# Patient Record
Sex: FEMALE | Race: BLACK OR AFRICAN AMERICAN | NOT HISPANIC OR LATINO | URBAN - METROPOLITAN AREA
[De-identification: names, ages, dates, MRNs, and addresses within clinical notes are randomized per-mention and may not be internally consistent; named-entity substitution may affect disease eponyms.]

---

## 2023-01-02 ENCOUNTER — EMERGENCY (EMERGENCY)
Facility: HOSPITAL | Age: 43
LOS: 1 days | Discharge: HOME | End: 2023-01-02
Attending: STUDENT IN AN ORGANIZED HEALTH CARE EDUCATION/TRAINING PROGRAM
Payer: MEDICAID

## 2023-01-02 VITALS
DIASTOLIC BLOOD PRESSURE: 68 MMHG | WEIGHT: 121.92 LBS | HEART RATE: 85 BPM | SYSTOLIC BLOOD PRESSURE: 118 MMHG | HEIGHT: 62 IN | OXYGEN SATURATION: 98 % | RESPIRATION RATE: 16 BRPM | TEMPERATURE: 98 F

## 2023-01-02 VITALS
DIASTOLIC BLOOD PRESSURE: 75 MMHG | RESPIRATION RATE: 18 BRPM | OXYGEN SATURATION: 99 % | TEMPERATURE: 98 F | HEART RATE: 76 BPM | SYSTOLIC BLOOD PRESSURE: 110 MMHG

## 2023-01-02 LAB
ALBUMIN SERPL ELPH-MCNC: 4.1 G/DL — SIGNIFICANT CHANGE UP (ref 3.3–5)
ALP SERPL-CCNC: 42 U/L — SIGNIFICANT CHANGE UP (ref 40–120)
ALT FLD-CCNC: 17 U/L — SIGNIFICANT CHANGE UP (ref 10–45)
ANION GAP SERPL CALC-SCNC: 10 MMOL/L — SIGNIFICANT CHANGE UP (ref 5–17)
APTT BLD: 29.8 SEC — SIGNIFICANT CHANGE UP (ref 27.5–35.5)
AST SERPL-CCNC: 16 U/L — SIGNIFICANT CHANGE UP (ref 10–40)
BASOPHILS # BLD AUTO: 0.06 K/UL — SIGNIFICANT CHANGE UP (ref 0–0.2)
BASOPHILS NFR BLD AUTO: 0.7 % — SIGNIFICANT CHANGE UP (ref 0–2)
BILIRUB SERPL-MCNC: 0.2 MG/DL — SIGNIFICANT CHANGE UP (ref 0.2–1.2)
BUN SERPL-MCNC: 9 MG/DL — SIGNIFICANT CHANGE UP (ref 7–23)
CALCIUM SERPL-MCNC: 8.9 MG/DL — SIGNIFICANT CHANGE UP (ref 8.4–10.5)
CHLORIDE SERPL-SCNC: 105 MMOL/L — SIGNIFICANT CHANGE UP (ref 96–108)
CO2 SERPL-SCNC: 24 MMOL/L — SIGNIFICANT CHANGE UP (ref 22–31)
CREAT SERPL-MCNC: 0.49 MG/DL — LOW (ref 0.5–1.3)
EGFR: 122 ML/MIN/1.73M2 — SIGNIFICANT CHANGE UP
EOSINOPHIL # BLD AUTO: 0.23 K/UL — SIGNIFICANT CHANGE UP (ref 0–0.5)
EOSINOPHIL NFR BLD AUTO: 2.9 % — SIGNIFICANT CHANGE UP (ref 0–6)
GLUCOSE SERPL-MCNC: 89 MG/DL — SIGNIFICANT CHANGE UP (ref 70–99)
HCG SERPL-ACNC: <2 MIU/ML — SIGNIFICANT CHANGE UP
HCT VFR BLD CALC: 38 % — SIGNIFICANT CHANGE UP (ref 34.5–45)
HGB BLD-MCNC: 12.9 G/DL — SIGNIFICANT CHANGE UP (ref 11.5–15.5)
IMM GRANULOCYTES NFR BLD AUTO: 0.1 % — SIGNIFICANT CHANGE UP (ref 0–0.9)
INR BLD: 1.11 RATIO — SIGNIFICANT CHANGE UP (ref 0.88–1.16)
LYMPHOCYTES # BLD AUTO: 3.04 K/UL — SIGNIFICANT CHANGE UP (ref 1–3.3)
LYMPHOCYTES # BLD AUTO: 37.7 % — SIGNIFICANT CHANGE UP (ref 13–44)
MCHC RBC-ENTMCNC: 30 PG — SIGNIFICANT CHANGE UP (ref 27–34)
MCHC RBC-ENTMCNC: 33.9 GM/DL — SIGNIFICANT CHANGE UP (ref 32–36)
MCV RBC AUTO: 88.4 FL — SIGNIFICANT CHANGE UP (ref 80–100)
MONOCYTES # BLD AUTO: 0.48 K/UL — SIGNIFICANT CHANGE UP (ref 0–0.9)
MONOCYTES NFR BLD AUTO: 5.9 % — SIGNIFICANT CHANGE UP (ref 2–14)
NEUTROPHILS # BLD AUTO: 4.25 K/UL — SIGNIFICANT CHANGE UP (ref 1.8–7.4)
NEUTROPHILS NFR BLD AUTO: 52.7 % — SIGNIFICANT CHANGE UP (ref 43–77)
NRBC # BLD: 0 /100 WBCS — SIGNIFICANT CHANGE UP (ref 0–0)
PLATELET # BLD AUTO: 235 K/UL — SIGNIFICANT CHANGE UP (ref 150–400)
POTASSIUM SERPL-MCNC: 3.9 MMOL/L — SIGNIFICANT CHANGE UP (ref 3.5–5.3)
POTASSIUM SERPL-SCNC: 3.9 MMOL/L — SIGNIFICANT CHANGE UP (ref 3.5–5.3)
PROT SERPL-MCNC: 6.6 G/DL — SIGNIFICANT CHANGE UP (ref 6–8.3)
PROTHROM AB SERPL-ACNC: 12.9 SEC — SIGNIFICANT CHANGE UP (ref 10.5–13.4)
RBC # BLD: 4.3 M/UL — SIGNIFICANT CHANGE UP (ref 3.8–5.2)
RBC # FLD: 13.8 % — SIGNIFICANT CHANGE UP (ref 10.3–14.5)
SODIUM SERPL-SCNC: 139 MMOL/L — SIGNIFICANT CHANGE UP (ref 135–145)
TROPONIN T, HIGH SENSITIVITY RESULT: <6 NG/L — SIGNIFICANT CHANGE UP (ref 0–51)
WBC # BLD: 8.07 K/UL — SIGNIFICANT CHANGE UP (ref 3.8–10.5)
WBC # FLD AUTO: 8.07 K/UL — SIGNIFICANT CHANGE UP (ref 3.8–10.5)

## 2023-01-02 PROCEDURE — 99285 EMERGENCY DEPT VISIT HI MDM: CPT | Mod: 25

## 2023-01-02 PROCEDURE — 71046 X-RAY EXAM CHEST 2 VIEWS: CPT | Mod: 26

## 2023-01-02 PROCEDURE — 71046 X-RAY EXAM CHEST 2 VIEWS: CPT

## 2023-01-02 PROCEDURE — 93005 ELECTROCARDIOGRAM TRACING: CPT

## 2023-01-02 PROCEDURE — 36415 COLL VENOUS BLD VENIPUNCTURE: CPT

## 2023-01-02 PROCEDURE — 85025 COMPLETE CBC W/AUTO DIFF WBC: CPT

## 2023-01-02 PROCEDURE — 99285 EMERGENCY DEPT VISIT HI MDM: CPT

## 2023-01-02 PROCEDURE — 85730 THROMBOPLASTIN TIME PARTIAL: CPT

## 2023-01-02 PROCEDURE — 80053 COMPREHEN METABOLIC PANEL: CPT

## 2023-01-02 PROCEDURE — 84702 CHORIONIC GONADOTROPIN TEST: CPT

## 2023-01-02 PROCEDURE — 84484 ASSAY OF TROPONIN QUANT: CPT

## 2023-01-02 PROCEDURE — 85610 PROTHROMBIN TIME: CPT

## 2023-01-02 RX ORDER — ACETAMINOPHEN 500 MG
975 TABLET ORAL ONCE
Refills: 0 | Status: COMPLETED | OUTPATIENT
Start: 2023-01-02 | End: 2023-01-02

## 2023-01-02 RX ORDER — LIDOCAINE 4 G/100G
1 CREAM TOPICAL ONCE
Refills: 0 | Status: COMPLETED | OUTPATIENT
Start: 2023-01-02 | End: 2023-01-02

## 2023-01-02 RX ADMIN — LIDOCAINE 1 PATCH: 4 CREAM TOPICAL at 22:57

## 2023-01-02 RX ADMIN — Medication 975 MILLIGRAM(S): at 22:57

## 2023-01-02 NOTE — ED PROVIDER NOTE - OBJECTIVE STATEMENT
42-year-old female without significant past medical history presents with substernal chest pain over the past 4 months that has been progressing and is worse with breathing.  Not associated with exertion, diaphoresis, shortness of breath, nausea/vomiting.  Patient reports that the pain is worse when she presses on her sternum however it also radiates to the back.  Patient has no history of blood clots, is not on hormonal birth control.  Patient has not had any long travel, or unilateral leg swelling.  Patient has no history of cardiac issues or early cardiac death in her family.  Patient was given a chest x-ray in the outpatient setting that was negative.  Patient has not had any recent travel or sick contacts. Patient has not tried any Tylenol or NSAIDs or heat or lidocaine patch for the pain. Otherwise patient has no headache, shortness of breath, nausea/vomiting/abdominal pain/diarrhea, dysuria, peripheral edema, fever/chills.

## 2023-01-02 NOTE — ED PROVIDER NOTE - CLINICAL SUMMARY MEDICAL DECISION MAKING FREE TEXT BOX
42-year-old female without significant past medical history presents with 4 months of substernal chest pain.  Likely costochondritis versus GERD.  However ACS cannot be excluded.  Labs, troponin, EKG, CXR ordered.

## 2023-01-02 NOTE — ED PROVIDER NOTE - PROGRESS NOTE DETAILS
Patient is reassured that all lab results and chest x-ray are negative.  Patient's pain is mildly improved with lidocaine patch and Tylenol. Likely costochondritis or GERD contributing.  Dispo to Home with PMD follow-up.

## 2023-01-02 NOTE — ED PROVIDER NOTE - PHYSICAL EXAMINATION
GENERAL: well appearing in no acute distress, non-toxic appearing  HEAD: normocephalic, atraumatic  HEENT: normal conjunctiva, oral mucosa moist, uvula midline  CHEST: Reproducible pain with palpation of the sternum midline.  Patient endorses that this pain is consistent with all prior episodes.  CARDIAC: regular rate and rhythm, normal S1S2, no appreciable murmurs  PULM: normal breath sounds, clear to ascultation bilaterally, no rales, rhonchi, wheezing  GI: abdomen nondistended, soft, nontender, no guarding, rebound tenderness  NEURO: moving all 4 extremities, no focal deficits, normal speech, AAOx3   MSK: no peripheral edema, no calf tenderness b/l  SKIN: well-perfused, extremities warm  PSYCH: appropriate mood and affect

## 2023-01-02 NOTE — ED PROVIDER NOTE - PATIENT PORTAL LINK FT
You can access the FollowMyHealth Patient Portal offered by [facility] by registering at the following website: [email or web address]. By joining [facility]’s FollowMyHealth portal, you will also be able to view your health information using other applications (apps) compatible with our system.

## 2023-01-02 NOTE — ED PROVIDER NOTE - NSFOLLOWUPCLINICS_GEN_ALL_ED_FT
[facility]  Cardiology  [address] [place] [id number]  Phone: [phone]  Fax:   Follow Up Time: Urgent

## 2023-01-02 NOTE — ED PROVIDER NOTE - ATTENDING CONTRIBUTION TO CARE
I, Vic Lloyd, performed a history and physical exam of the patient and discussed their management with the resident and/or advanced care provider. I reviewed the resident and/or advanced care provider's note and agree with the documented findings and plan of care. I was present and available for all procedures.    42-year-old female without significant past medical history presents with substernal chest pain over the past 4 months that has been progressing and is worse with breathing.  Not associated with exertion, diaphoresis, shortness of breath, nausea/vomiting.  Patient reports that the pain is worse when she presses on her sternum however it also radiates to the back.  Patient has no history of blood clots, is not on hormonal birth control.  Patient has not had any long travel, or unilateral leg swelling.  Patient has no history of cardiac issues or early cardiac death in her family.  Patient was given a chest x-ray in the outpatient setting that was negative.  Patient has not had any recent travel or sick contacts. Patient has not tried any Tylenol or NSAIDs or heat or lidocaine patch for the pain. Otherwise patient has no headache, shortness of breath, nausea/vomiting/abdominal pain/diarrhea, dysuria, peripheral edema, fever/chills.    Well appearing and in NAD, head normal appearing atraumatic, trachea midline, no respiratory distress, lungs cta bilaterally, rrr no murmurs, soft NT ND abdomen, no visible extremity deformities, Alert and oriented, non focal neuro exam, skin warm and dry, normal affect and mood, no leg swelling or jvd    pw chest pain, ekg. pt on monitor, enzymes, aspirin, cxr for pneumothorax or infiltrates, less likely PE with low risk on wells and PERC neg, unlikely dissection will disposition based on heart score

## 2023-01-02 NOTE — ED PROVIDER NOTE - RAPID ASSESSMENT
42F here for chest pain since September worse with breathing. No syncope or passing out. Pain worse today.     Li Faulkner, Attending Physician: Pt seen in waiting room for physicain triage - comprehensive history and physical is not performed by me - patient to be sent to main ED for full history / physical and medical evaluation - all orders placed by me to be followed by primary team in main ED.

## 2023-01-02 NOTE — ED ADULT NURSE NOTE - OBJECTIVE STATEMENT
43 yo F pt no PMHx p/w a few months of intermittent sharp stabbing pain worsened over the last day to constant stabbing pain from her back to anterior chest radiating down b/l UE worsens with deep inspiration/exertion. pt endorses smoking occasionally but stopped a year ago. pt has tried nothing for relief.  pt is A&Ox4, MAEW, skin arm dry intact/normal for race, cap refill < 2 seconds, breathing comfortably, no JVD, NSR on cardiac monitor.   Pt denies headache, dizziness, palpitations, cough, SOB, abdominal pain, n/v/d, urinary symptoms, fevers, chills, weakness at this time.

## 2023-04-28 ENCOUNTER — OUTPATIENT (OUTPATIENT)
Dept: OUTPATIENT SERVICES | Facility: HOSPITAL | Age: 43
LOS: 1 days | Discharge: HOME | End: 2023-04-28

## 2023-04-28 PROBLEM — Z78.9 OTHER SPECIFIED HEALTH STATUS: Chronic | Status: ACTIVE | Noted: 2023-01-02

## 2023-05-16 PROBLEM — Z00.00 ENCOUNTER FOR PREVENTIVE HEALTH EXAMINATION: Status: ACTIVE | Noted: 2023-05-16

## 2023-05-17 DIAGNOSIS — F43.0 ACUTE STRESS REACTION: ICD-10-CM

## 2023-05-17 DIAGNOSIS — F29 UNSPECIFIED PSYCHOSIS NOT DUE TO A SUBSTANCE OR KNOWN PHYSIOLOGICAL CONDITION: ICD-10-CM

## 2023-05-23 ENCOUNTER — APPOINTMENT (OUTPATIENT)
Dept: CARDIOLOGY | Facility: HOSPITAL | Age: 43
End: 2023-05-23

## 2023-08-28 ENCOUNTER — OFFICE VISIT (OUTPATIENT)
Dept: PHYSICAL THERAPY | Facility: CLINIC | Age: 43
End: 2023-08-28
Payer: COMMERCIAL

## 2023-08-28 DIAGNOSIS — M54.2 CHRONIC NECK PAIN: Primary | ICD-10-CM

## 2023-08-28 DIAGNOSIS — G89.29 CHRONIC NECK PAIN: Primary | ICD-10-CM

## 2023-08-28 PROCEDURE — 97110 THERAPEUTIC EXERCISES: CPT

## 2023-08-28 PROCEDURE — 97112 NEUROMUSCULAR REEDUCATION: CPT

## 2023-08-28 NOTE — PROGRESS NOTES
Daily Note     Today's date: 2023  Patient name: Luann Rahman  : 1980  MRN: [id number]  Referring provider: Destiny Champion*  Dx:   Encounter Diagnosis     ICD-10-CM    1. Chronic neck pain  M54.2     G89.29                      Subjective: Patient reports stiffness this morning, notes continued improvements with stretching. Objective: See treatment diary below      Assessment: Tolerated treatment well, cont to demonstrate painful/limited mobility but reporting consistent improvement in pain level. Patient demonstrated fatigue post treatment and would benefit from continued PT. Plan: Continue per plan of care. Progress note during next visit. Precautions: standard.      SOC: 23   FOTO: 23   POC Expiration: 10/23/23  Daily Treatment Log:  Date 23    Visit # 6/12 FOTO      Manual        Gentle manual traction        Prone PA mobs thoracic         Ther Exer 35'  28'  25' 30'     Pulleys  3'  5'  5'  5'     Supine cerv rot 1x10 R/L  Seated SNAGs w/ towel 3"x10 R/L       Supine OH flex w/ cane W/ MH 5"x10  W/ MH 5"x10  W/ MH 5"x10  W/ MH 5"x10     Supine B/L ER in 90/90 w/ cane  W/ MH 5"x10  W/ MH 5"x10        Wall slides  5"x10  5"x15  5"x15  5"x15     Seated pball 3 way str  5"x10 ea  5"x10 ea  5"x10 ea 5"x10 ea    Seated T/S ext W/ MB 3"x15   Over chair 3"x15  3"x15 over chair  3"x15 over     Self massage w/ MB behind back seated to paraspinals         B ER AROM W/ MH supine 1x10  W/ MH supine 1x10 W/ MH supine  1x10  W/ MH supine 1x10 5"    Standing open books         B/L horz abd against wall         Sup horiz abd  W/ MH 1x10  W/ MH 1x10  W/ MH 1x10  W/ MH1x10             HEP        Ther Activ                                                        NMReed 10'  10'  15'  10'    Scap retraction seated  B/L row yellow tubing 2x10  B/L row red tubing 1x10  B/L row red 2x10 B/L row red tube 2x10    B/L shoulder ext  Yellow tubing 2x10  Red tubing 1x10  Red tube 2x10  Red tube 2x10                             Modalities        MH  supine Cerv/chest 10'  during supine stretching   supine Cerv/chest 10'  during supine stretching  Supine cerv/chest during stretching Supine cerv/chest during stretching 10'                        Access Code: [email or web address]  URL: [email or web address]  Date: 08/21/2023  Prepared by: Tran Cho    Exercises  - Supine Chin Tuck  - 2 x daily - 7 x weekly - 2 sets - 10 reps - 5 hold  - Supine Cervical Rotation AROM on Pillow  - 2 x daily - 7 x weekly - 2 sets - 10 reps  - Supine Shoulder Horizontal Abduction with Dumbbells  - 1 x daily - 7 x weekly - 3 sets - 10 reps  - Supine Shoulder Flexion with Dowel  - 1 x daily - 7 x weekly - 3 sets - 10 reps  - Cervical Extension AROM with Strap  - 2 x daily - 7 x weekly - 2 sets - 10 reps  - Seated Thoracic Lumbar Extension  - 1 x daily - 7 x weekly - 3 sets - 10 reps  - Shoulder Flexion Wall Slide with Towel  - 1 x daily - 7 x weekly - 3 sets - 10 reps

## 2023-08-29 ENCOUNTER — OFFICE VISIT (OUTPATIENT)
Dept: OBGYN CLINIC | Facility: CLINIC | Age: 43
End: 2023-08-29
Payer: COMMERCIAL

## 2023-08-29 ENCOUNTER — APPOINTMENT (OUTPATIENT)
Dept: RADIOLOGY | Facility: CLINIC | Age: 43
End: 2023-08-29
Payer: COMMERCIAL

## 2023-08-29 VITALS
WEIGHT: 133.6 LBS | BODY MASS INDEX: 25.22 KG/M2 | HEART RATE: 76 BPM | SYSTOLIC BLOOD PRESSURE: 100 MMHG | HEIGHT: 61 IN | DIASTOLIC BLOOD PRESSURE: 67 MMHG

## 2023-08-29 DIAGNOSIS — M54.2 NECK PAIN: ICD-10-CM

## 2023-08-29 DIAGNOSIS — M79.641 RIGHT HAND PAIN: ICD-10-CM

## 2023-08-29 DIAGNOSIS — M54.12 CERVICAL RADICULOPATHY: Primary | ICD-10-CM

## 2023-08-29 PROCEDURE — 73130 X-RAY EXAM OF HAND: CPT

## 2023-08-29 PROCEDURE — 99204 OFFICE O/P NEW MOD 45 MIN: CPT | Performed by: ORTHOPAEDIC SURGERY

## 2023-08-29 PROCEDURE — 72040 X-RAY EXAM NECK SPINE 2-3 VW: CPT

## 2023-08-29 RX ORDER — MELOXICAM 15 MG/1
15 TABLET ORAL DAILY
COMMUNITY

## 2023-08-29 RX ORDER — METHOCARBAMOL 750 MG/1
750 TABLET, FILM COATED ORAL EVERY 6 HOURS PRN
COMMUNITY
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
## 2023-08-31 ENCOUNTER — TELEPHONE (OUTPATIENT)
Age: 43
End: 2023-08-31

## 2023-08-31 ENCOUNTER — EVALUATION (OUTPATIENT)
Dept: PHYSICAL THERAPY | Facility: CLINIC | Age: 43
End: 2023-08-31
Payer: COMMERCIAL

## 2023-08-31 DIAGNOSIS — M54.2 CHRONIC NECK PAIN: Primary | ICD-10-CM

## 2023-08-31 DIAGNOSIS — G89.29 CHRONIC NECK PAIN: Primary | ICD-10-CM

## 2023-08-31 PROCEDURE — 97110 THERAPEUTIC EXERCISES: CPT
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
## 2023-08-31 NOTE — TELEPHONE ENCOUNTER
Caller: Self    Doctor: Army Hyman    Reason for call: Patient calling to schedule MRI, warm transferred [facility] from central scheduling    Call back#: [phone]

## 2023-09-05 ENCOUNTER — TELEPHONE (OUTPATIENT)
Dept: PHYSICAL THERAPY | Facility: CLINIC | Age: 43
End: 2023-09-05

## 2023-09-05 ENCOUNTER — APPOINTMENT (OUTPATIENT)
Dept: PHYSICAL THERAPY | Facility: CLINIC | Age: 43
End: 2023-09-05
Payer: COMMERCIAL

## 2023-09-07 ENCOUNTER — OFFICE VISIT (OUTPATIENT)
Dept: PHYSICAL THERAPY | Facility: CLINIC | Age: 43
End: 2023-09-07
Payer: COMMERCIAL

## 2023-09-07 DIAGNOSIS — G89.29 CHRONIC NECK PAIN: Primary | ICD-10-CM

## 2023-09-07 DIAGNOSIS — M54.2 CHRONIC NECK PAIN: Primary | ICD-10-CM

## 2023-09-07 PROCEDURE — 97110 THERAPEUTIC EXERCISES: CPT

## 2023-09-07 PROCEDURE — 97112 NEUROMUSCULAR REEDUCATION: CPT

## 2023-09-07 NOTE — PROGRESS NOTES
Daily Note     Today's date: 2023  Patient name: Modesto Waite  : 1980  MRN: [id number]  Referring provider: Olivia Arechiga*  Dx:   Encounter Diagnosis     ICD-10-CM    1. Chronic neck pain  M54.2     G89.29           Start Time: 1750  Stop Time: 7010  Total time in clinic (min): 40 minutes     Treatment and documentation completed by Piedad Fuchs SPT, under direct supervision and review of documentation completed by Kenroy Gaffney DPT. Subjective: Patient reports soreness upon entering today's session and presented with a more guarded posture than her norm. Objective: See treatment diary below      Assessment: Tolerated treatment well, though cont to demonstrate painful/limited mobility but reporting consistent improvement in pain level. Pt kept her MH to b/l shoulders on during a few interventions in supine, sitting, and standing that felt more comfortable per pt. Patient demonstrated fatigue post treatment and would benefit from continued PT. Plan: Continue per plan of care. Progress note during next visit. Precautions: standard.      SOC: 23   FOTO: 23   POC Expiration: 10/23/23  Daily Treatment Log:  Date 23   Visit #  FOTO  7/12  8/12 9/12 10/12   Manual        Gentle manual traction        Prone PA mobs thoracic         Ther Exer 35'  35'  25' 30'  30'   Pulleys  3'  5'  5'  5'     Supine cerv rot 1x10 R/L  Seated SNAGs w/ towel 3"x10 R/L    Seated SNAGs w/ towel 3"x10 R/L    Supine OH flex w/ cane W/ MH 5"x10  W/ MH 5"x10  W/ MH 5"x10  W/ MH 5"x10  W/ MH 5"x10    Supine B/L ER in 90/90 w/ cane  W/ MH 5"x10  W/ MH 5"x10        Wall slides  5"x10  5"x15  5"x15  5"x15  5"x15' ea   Seated pball 3 way str  5"x10 ea  5"x10 ea  5"x10 ea 5"x10 ea 5"x10 ea   Seated T/S ext W/ MB 3"x15   Over chair 3"x15  3"x15 over chair  3"x15 over  3"x15 over    Self massage w/ MB behind back seated to paraspinals         B ER AROM W/ MH supine 1x10  W/ MH supine 1x10 W/ MH supine  1x10  W/ MH supine 1x10 5" W/ MH supine 1x10 5"   Standing open books         B/L horz abd against wall         Sup horiz abd  W/ MH 1x10  W/ MH 1x10  W/ MH 1x10  W/ MH1x10  ABD only, W/ MH1x10            HEP        Ther Activ                                                        NMReed 10'  10'  15'  10' 10'   Scap retraction seated  B/L row yellow tubing 2x10  B/L row red tubing 1x10  B/L row red 2x10 B/L row red tube 2x10 B/L row red tube 2x10   B/L shoulder ext  Yellow tubing 2x10  Red tubing 1x10  Red tube 2x10  Red tube 2x10  Red tube 2x10   Supine chin tuck     3"x10                   Modalities        MH  supine Cerv/chest 10'  during supine stretching   supine Cerv/chest 10'  during supine stretching  Supine cerv/chest during stretching Supine cerv/chest during stretching 10' Supine cerv/chest during stretching 10'                       Access Code: [email or web address]  URL: [email or web address]  Date: 08/21/2023  Prepared by: Pj Peters    Exercises  - Supine Chin Tuck  - 2 x daily - 7 x weekly - 2 sets - 10 reps - 5 hold  - Supine Cervical Rotation AROM on Pillow  - 2 x daily - 7 x weekly - 2 sets - 10 reps  - Supine Shoulder Horizontal Abduction with Dumbbells  - 1 x daily - 7 x weekly - 3 sets - 10 reps  - Supine Shoulder Flexion with Dowel  - 1 x daily - 7 x weekly - 3 sets - 10 reps  - Cervical Extension AROM with Strap  - 2 x daily - 7 x weekly - 2 sets - 10 reps  - Seated Thoracic Lumbar Extension  - 1 x daily - 7 x weekly - 3 sets - 10 reps  - Shoulder Flexion Wall Slide with Towel  - 1 x daily - 7 x weekly - 3 sets - 10 reps

## 2023-09-11 ENCOUNTER — OFFICE VISIT (OUTPATIENT)
Dept: PHYSICAL THERAPY | Facility: CLINIC | Age: 43
End: 2023-09-11
Payer: COMMERCIAL

## 2023-09-11 DIAGNOSIS — M54.2 CHRONIC NECK PAIN: Primary | ICD-10-CM

## 2023-09-11 DIAGNOSIS — G89.29 CHRONIC NECK PAIN: Primary | ICD-10-CM

## 2023-09-11 PROCEDURE — 97112 NEUROMUSCULAR REEDUCATION: CPT

## 2023-09-11 PROCEDURE — 97110 THERAPEUTIC EXERCISES: CPT

## 2023-09-11 NOTE — PROGRESS NOTES
Daily Note     Today's date: 2023  Patient name: Joseph Cobb  : 1980  MRN: [id number]  Referring provider: Gustavo Mejia*  Dx:   Encounter Diagnosis     ICD-10-CM    1. Chronic neck pain  M54.2     G89.29           Start Time: 4085  Stop Time: 1100  Total time in clinic (min): 45 minutes  Treatment and documentation completed by Becky DAY, under direct supervision and review of documentation completed by Jas CARMONAT. Subjective: Pt states she was in a lot of pain yesterday, and thinks this was due to the weather. She notes a fairly constant pain of around 7/10 from her chest down her b/l arms during this time. Pain presentation today is more prevalent R>L in the UEs. That being said, she notes that her chest is feeling "much better". Pt additionally notes tomorrow she is going to see her cardiologist.        Objective: See treatment diary below      Assessment: Tolerated treatment well, continuing to keep [address] on the b/l shoulders through her first few interventions. PT showed an objective decrease of pain levels and increase of t/s mobility following pa mobs to t/s. Patient continues to demonstrate painful/limited mobility during AAROM of the b/l shoulders, but reporting consistent improvement in pain level during session. Patient demonstrated fatigue post treatment and would benefit from continued PT to address the above functional limitations. Plan: Continue per plan of care. Progress note during next visit. Precautions: standard.      SOC: 23   FOTO: 23   POC Expiration: 10/23/23  Daily Treatment Log:  Date 23   Visit # 11/12    9/12 10/12   Manual 5'       Gentle manual traction        Prone PA mobs thoracic  SJ       Ther Exer 30'   30'  30'   Pulleys  5'   5'     Supine cerv rot     Seated SNAGs w/ towel 3"x10 R/L    Supine OH flex w/ cane W/ MH 5"x10   W/ MH 5"x10  W/ MH 5"x10    Supine B/L ER in 90/90 w/ cane         Wall slides  5"x15' ea   5"x15  5"x15' ea   Seated pball 3 way str  5"x10 ea   5"x10 ea 5"x10 ea   Seated T/S ext    3"x15 over  3"x15 over    Self massage w/ MB behind back seated to paraspinals         B ER AROM W/ MH supine 1x10 5"   W/ MH supine 1x10 5" W/ MH supine 1x10 5"   Standing open books  10x R/L       B/L horz abd against wall         Sup horiz abd  ABD only, W/ MH1x10    W/ MH1x10  ABD only, W/ MH1x10            HEP        Ther Activ                                                        NMReed 10'   10' 10'   Scap retraction seated  B/L row red tube 2x10   B/L row red tube 2x10 B/L row red tube 2x10   B/L shoulder ext  Red tube 2x10   Red tube 2x10  Red tube 2x10   Supine chin tuck 3"x10    3"x10                   Modalities        MH     Supine cerv/chest during stretching 10' Supine cerv/chest during stretching 10'                       Access Code: [email or web address]  URL: [email or web address]  Date: 08/21/2023  Prepared by: Maylin Noguera    Exercises  - Supine Chin Tuck  - 2 x daily - 7 x weekly - 2 sets - 10 reps - 5 hold  - Supine Cervical Rotation AROM on Pillow  - 2 x daily - 7 x weekly - 2 sets - 10 reps  - Supine Shoulder Horizontal Abduction with Dumbbells  - 1 x daily - 7 x weekly - 3 sets - 10 reps  - Supine Shoulder Flexion with Dowel  - 1 x daily - 7 x weekly - 3 sets - 10 reps  - Cervical Extension AROM with Strap  - 2 x daily - 7 x weekly - 2 sets - 10 reps  - Seated Thoracic Lumbar Extension  - 1 x daily - 7 x weekly - 3 sets - 10 reps  - Shoulder Flexion Wall Slide with Towel  - 1 x daily - 7 x weekly - 3 sets - 10 reps

## 2023-09-14 ENCOUNTER — OFFICE VISIT (OUTPATIENT)
Dept: PHYSICAL THERAPY | Facility: CLINIC | Age: 43
End: 2023-09-14
Payer: COMMERCIAL

## 2023-09-14 DIAGNOSIS — M54.2 CHRONIC NECK PAIN: Primary | ICD-10-CM

## 2023-09-14 DIAGNOSIS — G89.29 CHRONIC NECK PAIN: Primary | ICD-10-CM

## 2023-09-14 PROCEDURE — 97110 THERAPEUTIC EXERCISES: CPT | Performed by: PHYSICAL THERAPIST

## 2023-09-14 PROCEDURE — 97112 NEUROMUSCULAR REEDUCATION: CPT | Performed by: PHYSICAL THERAPIST

## 2023-09-14 NOTE — PROGRESS NOTES
Daily Note     Today's date: 2023  Patient name: Yulissa Hamlin  : 1980  MRN: [id number]  Referring provider: Vonda Prince*  Dx:   Encounter Diagnosis     ICD-10-CM    1. Chronic neck pain  M54.2     G89.29                      Subjective: Pt reports that she continues to feel stiff however, overall is pleased with her progress. Objective: See treatment diary below      Assessment: Tolerated treatment well. Patient demonstrated fatigue post treatment, exhibited good technique with therapeutic exercises and would benefit from continued PT      Plan: Continue per plan of care. Precautions: standard.      SOC: 23   FOTO: 23   POC Expiration: 10/23/23  Daily Treatment Log:  Date 23   Visit # 12  12/12  9/12 10/12   Manual 5'       Gentle manual traction        Prone PA mobs thoracic  SJ       Ther Exer 30'   30'  30'   Pulleys  5'   5'     Supine cerv rot     Seated SNAGs w/ towel 3"x10 R/L    Supine OH flex w/ cane W/ MH 5"x10 W/ MH 5' x 10   W/ MH 5"x10  W/ MH 5"x10    Supine B/L ER in 90/90 w/ cane         Wall slides  5"x15' ea 5' x 15   5"x15  5"x15' ea   Seated pball 3 way str  5"x10 ea 5' x 10 ea.   5"x10 ea 5"x10 ea   Seated T/S ext    3"x15 over  3"x15 over    Self massage w/ MB behind back seated to paraspinals         B ER AROM W/ MH supine 1x10 5" W/ MH supine 1 x 10 5"  W/ MH supine 1x10 5" W/ MH supine 1x10 5"   Standing open books  10x R/L 10x R/L      B/L horz abd against wall         Sup horiz abd  ABD only, W/ MH1x10  Abd only, w/ MH x 10   W/ MH1x10  ABD only, W/ MH1x10            HEP        Ther Activ                                                        NMReed 10'   10' 10'   Scap retraction seated  B/L row red tube 2x10   B/L row red tube 2x10 B/L row red tube 2x10   B/L shoulder ext  Red tube 2x10   Red tube 2x10  Red tube 2x10   Supine chin tuck 3"x10 3' x 10    3"x10                   Modalities             Supine cerv/chest during stretching 10' Supine cerv/chest during stretching 10'                       Access Code: [email or web address]  URL: [email or web address]  Date: 08/21/2023  Prepared by: Marychuy Munoz    Exercises  - Supine Chin Tuck  - 2 x daily - 7 x weekly - 2 sets - 10 reps - 5 hold  - Supine Cervical Rotation AROM on Pillow  - 2 x daily - 7 x weekly - 2 sets - 10 reps  - Supine Shoulder Horizontal Abduction with Dumbbells  - 1 x daily - 7 x weekly - 3 sets - 10 reps  - Supine Shoulder Flexion with Dowel  - 1 x daily - 7 x weekly - 3 sets - 10 reps  - Cervical Extension AROM with Strap  - 2 x daily - 7 x weekly - 2 sets - 10 reps  - Seated Thoracic Lumbar Extension  - 1 x daily - 7 x weekly - 3 sets - 10 reps  - Shoulder Flexion Wall Slide with Towel  - 1 x daily - 7 x weekly - 3 sets - 10 reps

## 2023-09-15 ENCOUNTER — HOSPITAL ENCOUNTER (OUTPATIENT)
Dept: RADIOLOGY | Facility: HOSPITAL | Age: 43
Discharge: HOME | End: 2023-09-15
Attending: ORTHOPAEDIC SURGERY
Payer: COMMERCIAL

## 2023-09-15 DIAGNOSIS — M54.12 CERVICAL RADICULOPATHY: ICD-10-CM

## 2023-09-15 PROCEDURE — 72141 MRI NECK SPINE W/O DYE: CPT

## 2023-09-15 PROCEDURE — G1004 CDSM NDSC: HCPCS

## 2023-09-18 ENCOUNTER — OFFICE VISIT (OUTPATIENT)
Dept: PHYSICAL THERAPY | Facility: CLINIC | Age: 43
End: 2023-09-18
Payer: COMMERCIAL

## 2023-09-18 DIAGNOSIS — M54.2 CHRONIC NECK PAIN: Primary | ICD-10-CM

## 2023-09-18 DIAGNOSIS — G89.29 CHRONIC NECK PAIN: Primary | ICD-10-CM

## 2023-09-18 PROCEDURE — 97110 THERAPEUTIC EXERCISES: CPT

## 2023-09-18 PROCEDURE — 97112 NEUROMUSCULAR REEDUCATION: CPT

## 2023-09-18 PROCEDURE — 97140 MANUAL THERAPY 1/> REGIONS: CPT

## 2023-09-18 NOTE — PROGRESS NOTES
Daily Note     Today's date: 2023  Patient name: Mitra Lim  : 1980  MRN: [id number]  Referring provider: Isaac Kwok*  Dx:   Encounter Diagnosis     ICD-10-CM    1. Chronic neck pain  M54.2     G89.29                      Subjective: Pt reports that she feels like her "mobility is better" in her UEs, though she believes that the current rainy weather has increased her pain today. Objective: See treatment diary below  Treatment and documentation completed by Santhosh Culp SPT, under direct supervision and review of documentation completed by Socorro Denise DPT. Assessment: Tolerated treatment well. Patient demonstrated fatigue post treatment, exhibited good technique with therapeutic exercises and would benefit from continued PT. Pt continues to wear MH to b/l shoulder for first few interventions. Trigger point release performed in sidelying w/RUE across body w/significant objective improvements noted in pt mobility following. Plan: Continue per plan of care. Precautions: standard. SOC: 23   FOTO: 23   POC Expiration: 10/23/23  Daily Treatment Log:  Date 23     Visit # 11/12       Manual 5'  10'     Gentle manual traction        Trigger point release to R trap   SJ     Prone PA mobs thoracic  SJ  SJ     Ther Exer 30'  20'     Pulleys  5'  5'     Supine cerv rot        Supine OH flex w/ cane W/ MH 5"x10 W/ MH 5' x 10  W/ MH 5' x 10     Supine B/L ER in 90/90 w/ cane         Wall slides  5"x15' ea 5' x 15  5' x 15      Seated pball 3 way str  5"x10 ea 5' x 10 ea. 5' x 10 ea.       Seated T/S ext        Self massage w/ MB behind back seated to paraspinals         B ER AROM W/ MH supine 1x10 5" W/ MH supine 1 x 10 5"      Standing open books  10x R/L 10x R/L 10x R/L     B/L horz abd against wall         Sup horiz abd  ABD only, W/ MH1x10  Abd only, w/ MH x 10  Abd only, w/ MH x 10              HEP        Ther Activ NMReed 10'  10'     Scap retraction seated  B/L row red tube 2x10  Red tube 2x10     B/L shoulder ext  Red tube 2x10  Red tube 2x10     Supine chin tuck 3"x10 3' x 10  3' x 10      Seated t/s rotation w/foam between legs   10x each direction              Modalities        MH                             Access Code: [email or web address]  URL: [email or web address]  Date: 08/21/2023  Prepared by: Ulysses Melgar    Exercises  - Supine Chin Tuck  - 2 x daily - 7 x weekly - 2 sets - 10 reps - 5 hold  - Supine Cervical Rotation AROM on Pillow  - 2 x daily - 7 x weekly - 2 sets - 10 reps  - Supine Shoulder Horizontal Abduction with Dumbbells  - 1 x daily - 7 x weekly - 3 sets - 10 reps  - Supine Shoulder Flexion with Dowel  - 1 x daily - 7 x weekly - 3 sets - 10 reps  - Cervical Extension AROM with Strap  - 2 x daily - 7 x weekly - 2 sets - 10 reps  - Seated Thoracic Lumbar Extension  - 1 x daily - 7 x weekly - 3 sets - 10 reps  - Shoulder Flexion Wall Slide with Towel  - 1 x daily - 7 x weekly - 3 sets - 10 reps

## 2023-09-19 DIAGNOSIS — M54.12 CERVICAL RADICULOPATHY: Primary | ICD-10-CM

## 2023-09-20 ENCOUNTER — TELEPHONE (OUTPATIENT)
Dept: OBGYN CLINIC | Facility: CLINIC | Age: 43
End: 2023-09-20

## 2023-09-20 NOTE — TELEPHONE ENCOUNTER
juan alberto pt and advised, Pt scheduled   ----- Message from Allison Lewis DO sent at 9/19/2023  9:34 AM EDT -----  Celeste Morales had an mri of her neck. It showed narrowing where one of her nerves exits the spine. This can be causing her pain. I would like for her to follow up with our spine and pain doctors to help treat this.

## 2023-09-21 ENCOUNTER — CONSULT (OUTPATIENT)
Dept: PAIN MEDICINE | Facility: CLINIC | Age: 43
End: 2023-09-21
Payer: COMMERCIAL

## 2023-09-21 ENCOUNTER — TELEPHONE (OUTPATIENT)
Dept: PAIN MEDICINE | Facility: CLINIC | Age: 43
End: 2023-09-21

## 2023-09-21 VITALS
HEIGHT: 61 IN | HEART RATE: 106 BPM | SYSTOLIC BLOOD PRESSURE: 122 MMHG | BODY MASS INDEX: 25.49 KG/M2 | RESPIRATION RATE: 18 BRPM | WEIGHT: 135 LBS | DIASTOLIC BLOOD PRESSURE: 73 MMHG

## 2023-09-21 DIAGNOSIS — M54.12 CERVICAL RADICULOPATHY: ICD-10-CM

## 2023-09-21 DIAGNOSIS — M50.13 CERVICAL DISC DISORDER WITH RADICULOPATHY OF CERVICOTHORACIC REGION: Primary | ICD-10-CM

## 2023-09-21 PROCEDURE — 99204 OFFICE O/P NEW MOD 45 MIN: CPT | Performed by: STUDENT IN AN ORGANIZED HEALTH CARE EDUCATION/TRAINING PROGRAM

## 2023-09-21 RX ORDER — METOPROLOL SUCCINATE 25 MG/1
TABLET, EXTENDED RELEASE ORAL
COMMUNITY
Start: 2023-07-13

## 2023-09-21 RX ORDER — GABAPENTIN 300 MG/1
300 CAPSULE ORAL
Qty: 30 CAPSULE | Refills: 0 | Status: SHIPPED | OUTPATIENT
Start: 2023-09-21 | End: 2023-10-21

## 2023-09-21 NOTE — TELEPHONE ENCOUNTER
Scheduled patient for GEORGE 10/20/23  Patient denies RX blood thinners/ NSAIDS  Nothing to eat or drink 1 hour prior to procedure  Needs to arrange transportation  Proper clothing for procedure  No vaccines 2 weeks prior or after procedure  If ill or place on antibiotics, please call to reschedule
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
## 2023-09-25 ENCOUNTER — OFFICE VISIT (OUTPATIENT)
Dept: PHYSICAL THERAPY | Facility: CLINIC | Age: 43
End: 2023-09-25
Payer: COMMERCIAL

## 2023-09-25 DIAGNOSIS — G89.29 CHRONIC NECK PAIN: Primary | ICD-10-CM

## 2023-09-25 DIAGNOSIS — M54.2 CHRONIC NECK PAIN: Primary | ICD-10-CM

## 2023-09-25 PROCEDURE — 97140 MANUAL THERAPY 1/> REGIONS: CPT

## 2023-09-25 PROCEDURE — 97110 THERAPEUTIC EXERCISES: CPT

## 2023-09-25 PROCEDURE — 97112 NEUROMUSCULAR REEDUCATION: CPT

## 2023-09-25 NOTE — PROGRESS NOTES
Daily Note     Today's date: 2023  Patient name: Lelo Garnica  : 1980  MRN: [id number]  Referring provider: Laverne Henriquez*  Dx:   Encounter Diagnosis     ICD-10-CM    1. Chronic neck pain  M54.2     G89.29                      Subjective: Pt reports she saw pain management after her MRI results came in and they are recommending an injection for her neck. She is unsure whether she wants to proceed but did schedule it. In general she reports improvements in her neck, chest and upper back since starting therapy however she does cont to have daily pain and limitation. Objective: See treatment diary below    Assessment: Tolerated treatment well, with gradual improvements noted in mobility and pain of lower cervical upper thoracic spine; cont to c/o pain in chest that limits her activity tolerance. Plan to add in SNAGS for rotation next session. Patient demonstrated fatigue post treatment, exhibited good technique with therapeutic exercises and would benefit from continued PT. Plan: Continue per plan of care. Precautions: standard. SOC: 23   FOTO: 23   POC Expiration: 10/23/23  Daily Treatment Log:  Date 23     Visit # 11/12      Manual 5'  10' 10'    Gentle manual traction        Trigger point release to R trap   SJ LL    Prone PA mobs thoracic  SJ  SJ LL    Ther Exer 30'  20' 20'    Pulleys  5'  5' 5'    Supine cerv rot        Supine OH flex w/ cane W/ MH 5"x10 W/ MH 5' x 10  W/ MH 5' x 10 W/ MH 5"x10      Wall slides  5"x15' ea 5' x 15  5' x 15  5"x15     Seated pball 3 way str  5"x10 ea 5' x 10 ea. 5' x 10 ea.       Seated T/S ext        B ER AROM W/ MH supine 1x10 5" W/ MH supine 1 x 10 5"  W/ MH in supine 5"x10     Standing open books  10x R/L 10x R/L 10x R/L     B/L horz abd against wall         Sup horiz abd  ABD only, W/ MH1x10  Abd only, w/ MH x 10  Abd only, w/ MH x 10  abd only w/ MH x10     SNAGS rot w/ towel         HEP        Ther Activ                                                        NMReed 10'  10' 8'    Scap retraction seated  B/L row red tube 2x10  Red tube 2x10 Red tube 2x10     B/L shoulder ext  Red tube 2x10  Red tube 2x10 Red tube 2x10     Supine chin tuck 3"x10 3' x 10  3' x 10  3" x10    Seated t/s rotation w/foam between legs   10x each direction              Modalities        MH                             Access Code: [email or web address]  URL: [email or web address]  Date: 08/21/2023  Prepared by: Ninfa Renteria    Exercises  - Supine Chin Tuck  - 2 x daily - 7 x weekly - 2 sets - 10 reps - 5 hold  - Supine Cervical Rotation AROM on Pillow  - 2 x daily - 7 x weekly - 2 sets - 10 reps  - Supine Shoulder Horizontal Abduction with Dumbbells  - 1 x daily - 7 x weekly - 3 sets - 10 reps  - Supine Shoulder Flexion with Dowel  - 1 x daily - 7 x weekly - 3 sets - 10 reps  - Cervical Extension AROM with Strap  - 2 x daily - 7 x weekly - 2 sets - 10 reps  - Seated Thoracic Lumbar Extension  - 1 x daily - 7 x weekly - 3 sets - 10 reps  - Shoulder Flexion Wall Slide with Towel  - 1 x daily - 7 x weekly - 3 sets - 10 reps

## 2023-09-28 ENCOUNTER — OFFICE VISIT (OUTPATIENT)
Dept: PHYSICAL THERAPY | Facility: CLINIC | Age: 43
End: 2023-09-28
Payer: COMMERCIAL

## 2023-09-28 DIAGNOSIS — G89.29 CHRONIC NECK PAIN: Primary | ICD-10-CM

## 2023-09-28 DIAGNOSIS — M54.2 CHRONIC NECK PAIN: Primary | ICD-10-CM

## 2023-09-28 PROCEDURE — 97110 THERAPEUTIC EXERCISES: CPT

## 2023-09-28 PROCEDURE — 97112 NEUROMUSCULAR REEDUCATION: CPT

## 2023-09-28 PROCEDURE — 97140 MANUAL THERAPY 1/> REGIONS: CPT

## 2023-09-28 NOTE — PROGRESS NOTES
Daily Note     Today's date: 2023  Patient name: Lazarus Veronica  : 1980  MRN: [id number]  Referring provider: Marques Rider*  Dx:   Encounter Diagnosis     ICD-10-CM    1. Chronic neck pain  M54.2     G89.29                      Subjective: Pt denies changes since last session, she did feel relief post treatment. States it is difficult for her to sit in her class due to pain. Objective: See treatment diary below      Assessment: Tolerated treatment well with good tolerance with major weakness noted w/ scap retraction in prone. Patient demonstrated fatigue post treatment, exhibited good technique with therapeutic exercises and would benefit from continued PT. Plan: Continue per plan of care. Precautions: standard. SOC: 23   FOTO: 23   POC Expiration: 10/23/23  Daily Treatment Log:  Date 23    Visit #  FOTO 15/24   Manual 5'  10' 10' 10'   Trigger point release to R trap   SJ LL LL   Prone PA mobs thoracic  SJ  SJ LL LL   Ther Exer 30'  20' 20' 15'   Pulleys  5'  5' 5' 5' w/ MH   Supine cerv rot        Supine OH flex w/ cane W/ MH 5"x10 W/ MH 5' x 10  W/ MH 5' x 10 W/ MH 5"x10   W/ MH 5" 2x10   Wall slides  5"x15' ea 5' x 15  5' x 15  5"x15  5"x10    Seated pball 3 way str  5"x10 ea 5' x 10 ea.   5' x 10 ea.   5"x10 ea   Seated T/S ext        B ER AROM W/ MH supine 1x10 5" W/ MH supine 1 x 10 5"  W/ MH in supine 5"x10  W/ MH in supine 5"x10    Standing open books  10x R/L 10x R/L 10x R/L     B/L horz abd against wall         Sup horiz abd  ABD only, W/ MH1x10  Abd only, w/ MH x 10  Abd only, w/ MH x 10  abd only w/ MH x10     SNAGS rot+ext w/ towel      1x10 ea    Uni pec str                         HEP        Ther Activ                                                        NMReed 10'  10' 8' 15'   Scap retraction  B/L row red tube 2x10  Red tube 2x10 Red tube 2x10  Red tube 2x10    B/L shoulder ext Red tube 2x10  Red tube 2x10 Red tube 2x10  Red tube 2x10    Supine chin tuck 3"x10 3' x 10  3' x 10  3" x10 2x10    Seated t/s rotation w/foam between legs   10x each direction      Prone scap retr     1x5   Prone T     1x5   Modalities        MH                             Access Code: [email or web address]  URL: [email or web address]  Date: 08/21/2023  Prepared by: Zafar Tolliver    Exercises  - Supine Chin Tuck  - 2 x daily - 7 x weekly - 2 sets - 10 reps - 5 hold  - Supine Cervical Rotation AROM on Pillow  - 2 x daily - 7 x weekly - 2 sets - 10 reps  - Supine Shoulder Horizontal Abduction with Dumbbells  - 1 x daily - 7 x weekly - 3 sets - 10 reps  - Supine Shoulder Flexion with Dowel  - 1 x daily - 7 x weekly - 3 sets - 10 reps  - Cervical Extension AROM with Strap  - 2 x daily - 7 x weekly - 2 sets - 10 reps  - Seated Thoracic Lumbar Extension  - 1 x daily - 7 x weekly - 3 sets - 10 reps  - Shoulder Flexion Wall Slide with Towel  - 1 x daily - 7 x weekly - 3 sets - 10 reps

## 2023-10-02 ENCOUNTER — OFFICE VISIT (OUTPATIENT)
Dept: PHYSICAL THERAPY | Facility: CLINIC | Age: 43
End: 2023-10-02
Payer: COMMERCIAL

## 2023-10-02 DIAGNOSIS — G89.29 CHRONIC NECK PAIN: Primary | ICD-10-CM

## 2023-10-02 DIAGNOSIS — M54.2 CHRONIC NECK PAIN: Primary | ICD-10-CM

## 2023-10-02 PROCEDURE — 97112 NEUROMUSCULAR REEDUCATION: CPT

## 2023-10-02 PROCEDURE — 97110 THERAPEUTIC EXERCISES: CPT

## 2023-10-02 NOTE — PROGRESS NOTES
Daily Note     Today's date: 10/2/2023  Patient name: Kath Woodson  : 1980  MRN: [id number]  Referring provider: Saida Polo  Dx:   Encounter Diagnosis     ICD-10-CM    1. Chronic neck pain  M54.2     G89.29                      Subjective pt reports that she has dem increased mobility and dec pain in her chest. She has been taking muscle relaxers and gabapentin which makes her sleepy. Objective: See treatment diary below      Assessment: Tolerated treatment well. Patient would benefit from continued PT      Plan: Continue per plan of care. Precautions: standard.      SOC: 23   FOTO: 23   POC Expiration: 10/23/23  Daily Treatment Log:  Date 10/2/2023  09/18/23 9/25/23  9/28/23    Visit #  FOTO 15/24   Manual 5'   10' 10' 10'   Trigger point release to R trap   SJ LL LL   Prone PA mobs thoracic  RR   SJ LL LL   Ther Exer 25'   20' 20' 15'   Pulleys  5' w/ MH   5' 5' 5' w/ MH   Supine cerv rot        Supine OH flex w/ cane W/ MH 5"x15  W/ MH 5' x 10 W/ MH 5"x10   W/ MH 5" 2x10   Wall slides  5"x15   5' x 15  5"x15  5"x10    Seated pball 3 way str  5"x10 ea  5' x 10 ea.   5"x10 ea   Seated T/S ext        B ER AROM W/ MH supine 5"x15    W/ MH in supine 5"x10  W/ MH in supine 5"x10    Standing open books    10x R/L     B/L horz abd against wall         Sup horiz abd    Abd only, w/ MH x 10  abd only w/ MH x10     SNAGS rot+ext w/ towel  1x10 ea     1x10 ea    Uni pec str                         HEP        Ther Activ                                                        NMReed 10'  10' 8' 15'   Scap retraction  B/L row red tube 2x10  Red tube 2x10 Red tube 2x10  Red tube 2x10    B/L shoulder ext  Red tube 2x10  Red tube 2x10 Red tube 2x10  Red tube 2x10    Supine chin tuck Seated 15x   3' x 10  3" x10 2x10    Seated t/s rotation w/foam between legs   10x each direction      Prone scap retr 2x5    1x5   Prone T 2x5     1x5   Modalities        MH Access Code: [email or web address]  URL: [email or web address]  Date: 08/21/2023  Prepared by: Denice Perkins    Exercises  - Supine Chin Tuck  - 2 x daily - 7 x weekly - 2 sets - 10 reps - 5 hold  - Supine Cervical Rotation AROM on Pillow  - 2 x daily - 7 x weekly - 2 sets - 10 reps  - Supine Shoulder Horizontal Abduction with Dumbbells  - 1 x daily - 7 x weekly - 3 sets - 10 reps  - Supine Shoulder Flexion with Dowel  - 1 x daily - 7 x weekly - 3 sets - 10 reps  - Cervical Extension AROM with Strap  - 2 x daily - 7 x weekly - 2 sets - 10 reps  - Seated Thoracic Lumbar Extension  - 1 x daily - 7 x weekly - 3 sets - 10 reps  - Shoulder Flexion Wall Slide with Towel  - 1 x daily - 7 x weekly - 3 sets - 10 reps

## 2023-10-05 ENCOUNTER — OFFICE VISIT (OUTPATIENT)
Dept: PHYSICAL THERAPY | Facility: CLINIC | Age: 43
End: 2023-10-05
Payer: COMMERCIAL

## 2023-10-05 DIAGNOSIS — M54.2 CHRONIC NECK PAIN: Primary | ICD-10-CM

## 2023-10-05 DIAGNOSIS — G89.29 CHRONIC NECK PAIN: Primary | ICD-10-CM

## 2023-10-05 PROCEDURE — 97110 THERAPEUTIC EXERCISES: CPT

## 2023-10-05 PROCEDURE — 97112 NEUROMUSCULAR REEDUCATION: CPT

## 2023-10-05 NOTE — PROGRESS NOTES
Daily Note     Today's date: 10/5/2023  Patient name: Tony Quiñones  : 1980  MRN: [id number]  Referring provider: Richar Johnson*  Dx:   Encounter Diagnosis     ICD-10-CM    1. Chronic neck pain  M54.2     G89.29                      Subjective Patient reports she cont to question whether or not she would like to proceed with the epidurla injection; notes she cont to feel improvement w/ therapy but remains limited and painful with a lot of her daily activities. Objective: See treatment diary below      Assessment: Tolerated treatment well. Able to tolerate inc thoracic mobility and stretching for the chest without inc in pain. Cont to demo painful trigger point near R meidal scap border. Patient would benefit from continued PT      Plan: Continue per plan of care. Precautions: standard.      SOC: 23   FOTO: 23   POC Expiration: 10/23/23  Daily Treatment Log:  Date 10/2/2023 10/5      Visit #       Manual 5'        Trigger point release to R trap  LL      Prone PA mobs thoracic  RR  LL      Ther Exer 25'  25'      Pulleys  5' w/ MH  3'       Supine cerv rot        Supine OH flex w/ cane W/ MH 5"x15 W/ MH 2x10       Wall slides  5"x15  5"x15       Seated pball 3 way str  5"x10 ea 5" x10 ea      Seated T/S ext        B ER AROM W/ MH supine 5"x15  W/ MH supine 5"x15      Standing open books   Side lying open book 1x10 R/L       B/L horz abd against wall         SNAGS rot+ext w/ towel  1x10 ea  1x10 ea      Doorway pec str  5"x10                       HEP        Ther Activ                                                        NMReed 10' 10'      B/L row Red tube 2x10 Red tube 2x10      B/L shoulder ext  Red tube 2x10 Red tube 2x10       Supine chin tuck Seated 15x  Supine 2x10       Seated t/s rotation w/foam between legs        Prone scap retr 2x5       Prone T 2x5        Modalities        MH                             Access Code: [email or web address]  URL: [email or web address]  Date: 08/21/2023  Prepared by: Vignesh Stein    Exercises  - Supine Chin Tuck  - 2 x daily - 7 x weekly - 2 sets - 10 reps - 5 hold  - Supine Cervical Rotation AROM on Pillow  - 2 x daily - 7 x weekly - 2 sets - 10 reps  - Supine Shoulder Horizontal Abduction with Dumbbells  - 1 x daily - 7 x weekly - 3 sets - 10 reps  - Supine Shoulder Flexion with Dowel  - 1 x daily - 7 x weekly - 3 sets - 10 reps  - Cervical Extension AROM with Strap  - 2 x daily - 7 x weekly - 2 sets - 10 reps  - Seated Thoracic Lumbar Extension  - 1 x daily - 7 x weekly - 3 sets - 10 reps  - Shoulder Flexion Wall Slide with Towel  - 1 x daily - 7 x weekly - 3 sets - 10 reps

## 2023-10-09 ENCOUNTER — OFFICE VISIT (OUTPATIENT)
Dept: PHYSICAL THERAPY | Facility: CLINIC | Age: 43
End: 2023-10-09
Payer: COMMERCIAL

## 2023-10-09 ENCOUNTER — APPOINTMENT (OUTPATIENT)
Dept: PHYSICAL THERAPY | Facility: CLINIC | Age: 43
End: 2023-10-09
Payer: COMMERCIAL

## 2023-10-09 DIAGNOSIS — M54.2 CHRONIC NECK PAIN: Primary | ICD-10-CM

## 2023-10-09 DIAGNOSIS — G89.29 CHRONIC NECK PAIN: Primary | ICD-10-CM

## 2023-10-09 PROCEDURE — 97140 MANUAL THERAPY 1/> REGIONS: CPT

## 2023-10-09 PROCEDURE — 97112 NEUROMUSCULAR REEDUCATION: CPT

## 2023-10-09 PROCEDURE — 97110 THERAPEUTIC EXERCISES: CPT

## 2023-10-09 NOTE — PROGRESS NOTES
Daily Note     Today's date: 10/9/2023  Patient name: Marek Hutson  : 1980  MRN: [id number]  Referring provider: Kylah Sheldon*  Dx:   Encounter Diagnosis     ICD-10-CM    1. Chronic neck pain  M54.2     G89.29           Start Time: 81  Stop Time: 06  Total time in clinic (min): 45 minutes    Subjective Patient is still unsure if she wants to go forward with her scheduled c/s injection on 10/20/23. She admits that some hesitancy comes from seeing a family member have pain with their own injections for their back. However, pt notes that she is feeling a reduction of pain and tightness in her chest, and is finding significant relief from trigger point massage techniques. Objective: See treatment diary below    Treatment and documentation completed by Jeana Rodas SPT, under direct supervision and review of documentation completed by Ricardo Albrecht DPT. Assessment: Tolerated treatment well. Pt found significant pain relief following trigger point release to posterior R shoulder/med scap border and b/l traps. She was then instructed on how to do this w/lacrosse ball at home. Pt continues to benefit from VCs and demo for majority of selected interventions. Patient would benefit from continued PT to address aforementioned functional limitations and improve activiy tolerance. Plan: Continue per plan of care. Precautions: standard.      SOC: 23   FOTO: 23   POC Expiration: 10/23/23  Daily Treatment Log:  Date 10/2/2023 10/5 10/9/23     Visit # 24      Manual 5'   10'     Trigger point release to R trap  LL SJ     Prone PA mobs thoracic  RR  LL SJ     Ther Exer 25'  25' 20'     Pulleys  5' w/ MH  3'       Supine cerv rot        Supine OH flex w/ cane W/ MH 5"x15 W/ MH 2x10  W/ MH 2x10      Wall slides  5"x15  5"x15  5"x15      Seated pball 3 way str  5"x10 ea 5" x10 ea 5" x10 ea     Seated T/S ext        B ER AROM W/ MH supine 5"x15  W/ MH supine 5"x15 W/ MH supine 5"x15     Standing open books   Side lying open book 1x10 R/L       B/L horz abd against wall         SNAGS rot+ext w/ towel  1x10 ea  1x10 ea      Doorway pec str  5"x10  5"x10                      HEP        Ther Activ                                                        NMReed 10' 10' 15'     B/L row Red tube 2x10 Red tube 2x10 Red tube 2x10     B/L shoulder ext  Red tube 2x10 Red tube 2x10  Red tube 2x10     Supine chin tuck Seated 15x  Supine 2x10  Supine 2x10      Seated t/s rotation w/foam between legs   10x, R/L     Prone scap retr 2x5       Self trap release using lacrosse ball   3x 30s     Prone T 2x5        Modalities                                     Access Code: [email or web address]  URL: [email or web address]  Date: 08/21/2023  Prepared by: Elizabeth Quarles    Exercises  - Supine Chin Tuck  - 2 x daily - 7 x weekly - 2 sets - 10 reps - 5 hold  - Supine Cervical Rotation AROM on Pillow  - 2 x daily - 7 x weekly - 2 sets - 10 reps  - Supine Shoulder Horizontal Abduction with Dumbbells  - 1 x daily - 7 x weekly - 3 sets - 10 reps  - Supine Shoulder Flexion with Dowel  - 1 x daily - 7 x weekly - 3 sets - 10 reps  - Cervical Extension AROM with Strap  - 2 x daily - 7 x weekly - 2 sets - 10 reps  - Seated Thoracic Lumbar Extension  - 1 x daily - 7 x weekly - 3 sets - 10 reps  - Shoulder Flexion Wall Slide with Towel  - 1 x daily - 7 x weekly - 3 sets - 10 reps

## 2023-10-12 ENCOUNTER — OFFICE VISIT (OUTPATIENT)
Dept: PHYSICAL THERAPY | Facility: CLINIC | Age: 43
End: 2023-10-12
Payer: COMMERCIAL

## 2023-10-12 DIAGNOSIS — G89.29 CHRONIC NECK PAIN: Primary | ICD-10-CM

## 2023-10-12 DIAGNOSIS — M54.2 CHRONIC NECK PAIN: Primary | ICD-10-CM

## 2023-10-12 PROCEDURE — 97112 NEUROMUSCULAR REEDUCATION: CPT

## 2023-10-12 PROCEDURE — 97110 THERAPEUTIC EXERCISES: CPT

## 2023-10-12 NOTE — PROGRESS NOTES
Daily Note     Today's date: 10/12/2023  Patient name: Laina Alexis  : 1980  MRN: [id number]  Referring provider: Ricardo Bob*  Dx:   Encounter Diagnosis     ICD-10-CM    1. Chronic neck pain  M54.2     G89.29                      Subjective: "I spoke too soon when I said I was doing better last time." Pt reports that she was on the computer for 4 hours and she started getting pain shooting down into her R forearm. Objective: See treatment diary below      Assessment: Tolerated treatment well. Pt edu to trial repeated cerv retractions if she gets that radicular pain into her forearm again when using the computer, pt dem an understanding when edu of disc mechanics and the possibility of pressure on a nerve in a fwd head posture. Pt dem intermittent symptoms t/o session, modification to corner stretching to uni stretch to avoid scapular pain. Patient would benefit from continued PT      Plan: Continue per plan of care. Precautions: standard.      SOC: 23   FOTO: 23   POC Expiration: 10/23/23  Daily Treatment Log:  Date 10/2/2023 10/5 10/9/23 10/12/2023    Visit #     Manual 5'   10' 5'    Trigger point release to R trap  LL SJ     Prone PA mobs thoracic  RR  LL SJ RR    Ther Exer 25'  25' 20' 25'    Pulleys  5' w/ MH  3'   W/ MH 3'     Supine cerv rot        Supine OH flex w/ cane W/ MH 5"x15 W/ MH 2x10  W/ MH 2x10  W/ MH 5"x10      Wall slides  5"x15  5"x15  5"x15  5"x15     Seated pball 3 way str  5"x10 ea 5" x10 ea 5" x10 ea 5" x10 ea     Seated T/S ext        B ER AROM W/ MH supine 5"x15  W/ MH supine 5"x15 W/ MH supine 5"x15 W/ MH supine 5"x15     Supine horz abd/add     W/ MH 5"x10    Standing open books   Side lying open book 1x10 R/L       B/L horz abd against wall         SNAGS rot+ext w/ towel  1x10 ea  1x10 ea      Doorway pec str  5"x10  5"x10  Uni doorway stretch 5"x10 R/L                     HEP        Ther Activ NMReed 10' 10' 15' 10'    B/L row Red tube 2x10 Red tube 2x10 Red tube 2x10 Red tube 2x10    B/L shoulder ext  Red tube 2x10 Red tube 2x10  Red tube 2x10 Red tube 2x10     Supine chin tuck Seated 15x  Supine 2x10  Supine 2x10      Seated t/s rotation w/foam between legs   10x, R/L     Prone scap retr 2x5       Self trap release using lacrosse ball   3x 30s     Prone T 2x5        Modalities                                     Access Code: [email or web address]  URL: [email or web address]  Date: 08/21/2023  Prepared by: Sherif Schrader    Exercises  - Supine Chin Tuck  - 2 x daily - 7 x weekly - 2 sets - 10 reps - 5 hold  - Supine Cervical Rotation AROM on Pillow  - 2 x daily - 7 x weekly - 2 sets - 10 reps  - Supine Shoulder Horizontal Abduction with Dumbbells  - 1 x daily - 7 x weekly - 3 sets - 10 reps  - Supine Shoulder Flexion with Dowel  - 1 x daily - 7 x weekly - 3 sets - 10 reps  - Cervical Extension AROM with Strap  - 2 x daily - 7 x weekly - 2 sets - 10 reps  - Seated Thoracic Lumbar Extension  - 1 x daily - 7 x weekly - 3 sets - 10 reps  - Shoulder Flexion Wall Slide with Towel  - 1 x daily - 7 x weekly - 3 sets - 10 reps

## 2023-10-16 ENCOUNTER — EVALUATION (OUTPATIENT)
Dept: PHYSICAL THERAPY | Facility: CLINIC | Age: 43
End: 2023-10-16
Payer: COMMERCIAL

## 2023-10-16 DIAGNOSIS — G89.29 CHRONIC NECK PAIN: Primary | ICD-10-CM

## 2023-10-16 DIAGNOSIS — M54.2 CHRONIC NECK PAIN: Primary | ICD-10-CM

## 2023-10-16 PROCEDURE — 97164 PT RE-EVAL EST PLAN CARE: CPT

## 2023-10-16 PROCEDURE — 97112 NEUROMUSCULAR REEDUCATION: CPT

## 2023-10-16 PROCEDURE — 97110 THERAPEUTIC EXERCISES: CPT
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
## 2023-10-18 ENCOUNTER — APPOINTMENT (OUTPATIENT)
Dept: RADIOLOGY | Facility: CLINIC | Age: 43
End: 2023-10-18
Payer: COMMERCIAL

## 2023-10-18 ENCOUNTER — OFFICE VISIT (OUTPATIENT)
Dept: PAIN MEDICINE | Facility: CLINIC | Age: 43
End: 2023-10-18
Payer: COMMERCIAL

## 2023-10-18 VITALS
BODY MASS INDEX: 26.26 KG/M2 | DIASTOLIC BLOOD PRESSURE: 79 MMHG | SYSTOLIC BLOOD PRESSURE: 140 MMHG | WEIGHT: 139 LBS | HEART RATE: 88 BPM

## 2023-10-18 DIAGNOSIS — G89.4 CHRONIC PAIN SYNDROME: ICD-10-CM

## 2023-10-18 DIAGNOSIS — G89.29 CHRONIC RIGHT SHOULDER PAIN: ICD-10-CM

## 2023-10-18 DIAGNOSIS — M25.511 CHRONIC RIGHT SHOULDER PAIN: ICD-10-CM

## 2023-10-18 DIAGNOSIS — M50.120 CERVICAL DISC DISORDER WITH RADICULOPATHY OF MID-CERVICAL REGION: ICD-10-CM

## 2023-10-18 DIAGNOSIS — M54.12 CERVICAL RADICULOPATHY: Primary | ICD-10-CM

## 2023-10-18 DIAGNOSIS — M79.18 MYOFASCIAL PAIN SYNDROME: ICD-10-CM

## 2023-10-18 PROCEDURE — 73030 X-RAY EXAM OF SHOULDER: CPT

## 2023-10-18 PROCEDURE — 99214 OFFICE O/P EST MOD 30 MIN: CPT | Performed by: STUDENT IN AN ORGANIZED HEALTH CARE EDUCATION/TRAINING PROGRAM

## 2023-10-18 RX ORDER — METHYLPREDNISOLONE 4 MG/1
TABLET ORAL
Qty: 1 EACH | Refills: 0 | Status: SHIPPED | OUTPATIENT
Start: 2023-10-18

## 2023-10-18 RX ORDER — GABAPENTIN 400 MG/1
400 CAPSULE ORAL
Qty: 30 CAPSULE | Refills: 1 | Status: SHIPPED | OUTPATIENT
Start: 2023-10-18 | End: 2023-12-17

## 2023-10-18 RX ORDER — MELOXICAM 15 MG/1
15 TABLET ORAL DAILY
Qty: 30 TABLET | Refills: 1 | Status: SHIPPED | OUTPATIENT
Start: 2023-10-18 | End: 2023-12-17

## 2023-10-18 RX ORDER — METHOCARBAMOL 750 MG/1
750 TABLET, FILM COATED ORAL EVERY 6 HOURS PRN
Qty: 120 TABLET | Refills: 1 | Status: SHIPPED | OUTPATIENT
Start: 2023-10-18 | End: 2023-12-17

## 2023-10-18 NOTE — PATIENT INSTRUCTIONS
Date:January 4, 2022      Provider requested that no letter be sent. Do not send.       [facility]       Epidural Steroid Injection   AMBULATORY CARE:   What you need to know about an epidural steroid injection (GUERRERO):  An GUERRERO is a procedure to inject steroid medicine into the epidural space. The epidural space is between your spinal cord and vertebrae. Steroids reduce inflammation and fluid buildup in your spine that may be causing pain. You may be given pain medicine along with the steroids. How to prepare for an GUERRERO:  Your provider will talk to you about how to prepare for your procedure. He or she will tell you what medicines to take or not take on the day of your procedure. You may need to stop taking blood thinners or other medicines several days before your procedure. You may need to adjust any diabetes medicine you take on the day of your procedure. Steroid medicine can increase your blood sugar level. Arrange for someone to drive you home when you are discharged. What will happen during an GUERRERO:   You will be given medicine to numb the procedure area. You will be awake for the procedure, but you will not feel pain. You may also be given medicine to help you relax. Contrast liquid will be used to help your provider see the area better. Tell the provider if you have ever had an allergic reaction to contrast liquid. Your provider may place the needle into your neck area, middle of your back, or tailbone area. He or she may inject the medicine next to the nerves that are causing your pain. He or she may instead inject the medicine into a larger area of the epidural space. This helps the medicine spread to more nerves. Your provider will use a fluoroscope to help guide the needle to the right place. A fluoroscope is a type of x-ray. After the procedure, a bandage will be placed over the injection site to prevent infection. What will happen after an GUERRERO:  You will have a bandage over the injection site to prevent infection. Your provider will tell you when you can bathe and any activity guidelines.  You will be able to go home. Risks of an GUERRERO:  You may have temporary or permanent nerve damage or paralysis. You may have bleeding or develop a serious infection, such as meningitis (swelling of the brain coverings). An abscess may also develop. An abscess is a pus-filled area under the skin. You may need surgery to fix the abscess. You may have a seizure, anxiety, or trouble sleeping. If you are a man, you may have temporary erectile dysfunction (not able to have an erection). Call your local emergency number (911 in the [address]) if:   You have a seizure. You have trouble moving your legs. Seek care immediately if:   Blood soaks through your bandage. You have a fever or chills, severe back pain, and the procedure area is sensitive to the touch. You cannot control when you urinate or have a bowel movement. Call your doctor if:   You have weakness or numbness in your legs. Your wound is red, swollen, or draining pus. You have nausea or are vomiting. Your face or neck is red and you feel warm. You have more pain than you had before the procedure. You have swelling in your hands or feet. You have questions or concerns about your condition or care. Care for your wound as directed: You may remove the bandage before you go to bed the day of your procedure. You may take a shower, but do not take a bath for at least 24 hours. Self-care:   Do not drive,  use machines, or do strenuous activity for 24 hours after your procedure or as directed. Continue other treatments  as directed. Steroid injections alone will not control your pain. The injections are meant to be used with other treatments, such as physical therapy. Follow up with your doctor as directed:  Write down your questions so you remember to ask them during your visits. © Copyright [facility] 2023 Information is for End User's use only and may not be sold, redistributed or otherwise used for commercial purposes.   The above information is an  only. It is not intended as medical advice for individual conditions or treatments. Talk to your doctor, nurse or pharmacist before following any medical regimen to see if it is safe and effective for you.

## 2023-10-18 NOTE — PROGRESS NOTES
Pain Medicine Follow-Up Note    Assessment:  1. Cervical radiculopathy    2. Cervical disc disorder with radiculopathy of mid-cervical region    3. Myofascial pain syndrome    4. Chronic pain syndrome    5. Chronic right shoulder pain      Patient is a pleasant 44-year-old woman who returns as a follow-up visit after last being seen on 9/21/2023 for bilateral cervical radiculopathy symptoms worse on the right. Her pain has improved since last visit, she rates her pain as a 7 out of 10 on numeric rating scale whereas before her pain was a 10 out of 10 on numeric rating scale. The pain is worse in the morning, evening and at night and the pain is constant, occurring 100% of the time. She describes the pain as pressure-like, and pins-and-needles especially in the right upper extremity. She states her current pain regimen is making a significant difference in her life though as it is taken away 70% of her pain. She is currently on gabapentin 300 mg nightly and was previously prescribed Robaxin. She is also on meloxicam 15 mg daily as needed. Patient is currently scheduled for C7-T1 cervical epidural steroid injection. Patient also complaining of right shoulder pain that has been chronic since an injury sustained last year. To further evaluate we will get x-ray of the right shoulder. Additionally will increase gabapentin to 400 mg nightly. Patient like to hold off on any injection therapy at this time so patient will cancel her scheduled C7-T1 cervical epidural steroid injection. We will also continue Robaxin and meloxicam as prescribed. Patient to continue with physical therapy.     Plan:  Increase gabapentin 400 mg nightly  Continue robaxin 750 mg q6hr prn   Medrol dose pack   Continue mleoxicam 15 mg daily   Follow up in 8 months   Orders Placed This Encounter   Procedures   • XR shoulder 2+ vw right     Standing Status:   Future     Number of Occurrences:   1     Standing Expiration Date:   10/18/2027 Scheduling Instructions:      Bring along any outside films relating to this procedure. Order Specific Question:   When should the test be performed? Answer:   Urgent- less than one week     Order Specific Question:   Is the patient pregnant? Answer:   No   • Ambulatory referral to Physical Therapy     Standing Status:   Future     Standing Expiration Date:   10/18/2024     Referral Priority:   Routine     Referral Type:   Physical Therapy     Referral Reason:   Specialty Services Required     Requested Specialty:   Physical Therapy     Number of Visits Requested:   1     Expiration Date:   10/18/2024         New Medications Ordered This Visit   Medications   • meloxicam (MOBIC) 15 mg tablet     Sig: Take 1 tablet (15 mg total) by mouth daily     Dispense:  30 tablet     Refill:  1   • methocarbamol (ROBAXIN) 750 mg tablet     Sig: Take 1 tablet (750 mg total) by mouth every 6 (six) hours as needed for muscle spasms     Dispense:  120 tablet     Refill:  1   • methylPREDNISolone 4 MG tablet therapy pack     Sig: Use as directed on package     Dispense:  1 each     Refill:  0   • gabapentin (NEURONTIN) 400 mg capsule     Sig: Take 1 capsule (400 mg total) by mouth daily at bedtime     Dispense:  30 capsule     Refill:  1         My impressions and treatment recommendations were discussed in detail with the patient who verbalized understanding and had no further questions. Follow-up is planned in 2 months time or sooner as warranted. Discharge instructions were provided. I personally saw and examined the patient and I agree with the above discussed plan of care. History of Present Illness:    Mitra Lim is a 38 y.o. female who presents to [facility] for interval re-evaluation of the above stated pain complaints. The patient has a past medical and chronic pain history as outlined in the assessment section. She was last seen on 9/21/2023.     Patient is a pleasant 72-year-old woman who returns as a follow-up visit after last being seen on 9/21/2023 for bilateral cervical radiculopathy symptoms worse on the right. Her pain has improved since last visit, she rates her pain as a 7 out of 10 on numeric rating scale whereas before her pain was a 10 out of 10 on numeric rating scale. The pain is worse in the morning, evening and at night and the pain is constant, occurring 100% of the time. She describes the pain as pressure-like, and pins-and-needles especially in the right upper extremity. She states her current pain regimen is making a significant difference in her life though as it is taken away 70% of her pain. She is currently on gabapentin 300 mg nightly and was previously prescribed Robaxin. She is also on meloxicam 15 mg daily as needed. Patient is currently scheduled for C7-T1 cervical epidural steroid injection. Other than as stated above, the patient denies any interval changes in medications, medical condition, mental condition, symptoms, or allergies since the last office visit. Review of Systems:    Review of Systems   Constitutional:  Negative for chills and fatigue. HENT:  Negative for ear pain, mouth sores and sinus pressure. Eyes:  Negative for pain, redness and visual disturbance. Respiratory:  Negative for shortness of breath and wheezing. Cardiovascular:  Negative for chest pain and palpitations. Gastrointestinal:  Negative for abdominal pain and nausea. Endocrine: Negative for polyphagia. Musculoskeletal:  Positive for back pain, gait problem, neck pain and neck stiffness. Negative for arthralgias. Decreased ROM, Joint stiffness and muscle weakness in arms. Pain in the upper neck to the shoulder   Skin:  Negative for wound. Neurological:  Positive for weakness and numbness. Negative for seizures. Psychiatric/Behavioral:  Positive for sleep disturbance. Negative for dysphoric mood.           History reviewed. No pertinent past medical history. History reviewed. No pertinent surgical history. History reviewed. No pertinent family history. Social History     Occupational History   • Not on file   Tobacco Use   • Smoking status: Never   • Smokeless tobacco: Never   Vaping Use   • Vaping Use: Never used   Substance and Sexual Activity   • Alcohol use: Never   • Drug use: Never   • Sexual activity: Not on file         Current Outpatient Medications:   •  gabapentin (NEURONTIN) 400 mg capsule, Take 1 capsule (400 mg total) by mouth daily at bedtime, Disp: 30 capsule, Rfl: 1  •  meloxicam (MOBIC) 15 mg tablet, Take 1 tablet (15 mg total) by mouth daily, Disp: 30 tablet, Rfl: 1  •  methocarbamol (ROBAXIN) 750 mg tablet, Take 1 tablet (750 mg total) by mouth every 6 (six) hours as needed for muscle spasms, Disp: 120 tablet, Rfl: 1  •  methylPREDNISolone 4 MG tablet therapy pack, Use as directed on package, Disp: 1 each, Rfl: 0  •  metoprolol succinate (TOPROL-XL) 25 mg 24 hr tablet, TAKE 1 TABLET EVERY DAY BY ORAL ROUTE., Disp: , Rfl:     No Known Allergies    Physical Exam:    /79   Pulse 88   Wt 63 kg (139 lb)   BMI 26.26 kg/m²     Constitutional:normal, well developed, well nourished, alert, in no distress and non-toxic and no overt pain behavior. Eyes:anicteric  HEENT:grossly intact  Neck:supple, symmetric, trachea midline and no masses   Pulmonary:even and unlabored  Cardiovascular:No edema or pitting edema present  Skin:Normal without rashes or lesions and well hydrated  Psychiatric:Mood and affect appropriate  Neurologic:Cranial Nerves II-XII grossly intact  Musculoskeletal:normal gait. Limited range of motion.        Imaging  No orders to display         Orders Placed This Encounter   Procedures   • XR shoulder 2+ vw right   • Ambulatory referral to Physical Therapy

## 2023-10-19 ENCOUNTER — OFFICE VISIT (OUTPATIENT)
Dept: PHYSICAL THERAPY | Facility: CLINIC | Age: 43
End: 2023-10-19
Payer: COMMERCIAL

## 2023-10-19 DIAGNOSIS — M54.2 CHRONIC NECK PAIN: Primary | ICD-10-CM

## 2023-10-19 DIAGNOSIS — G89.29 CHRONIC NECK PAIN: Primary | ICD-10-CM

## 2023-10-19 PROCEDURE — 97110 THERAPEUTIC EXERCISES: CPT

## 2023-10-19 PROCEDURE — 97140 MANUAL THERAPY 1/> REGIONS: CPT

## 2023-10-19 NOTE — PROGRESS NOTES
Daily Note     Today's date: 10/19/2023  Patient name: Gwen Puckett  : 1980  MRN: [id number]  Referring provider: Yony Moody*  Dx:   Encounter Diagnosis     ICD-10-CM    1. Chronic neck pain  M54.2     G89.29                      Subjective: Patient reports the IASTM may have irritated her neck last session. States she was sore for a day or so. Reports otherwise things cont to improve slowly. Patient had follow up w/ pain management and decided to cancel her injection. They gave her an rx for steriod and increased her gabapentin. Notes xrays were done on her R shldr as well. Objective: See treatment diary below      Assessment: Tolerated treatment well. Improving cervical/thoracic AROM noted w/ less discomfort t/o session. Patient demonstrated fatigue post treatment, exhibited good technique with therapeutic exercises, and would benefit from continued PT. Plan: Continue per plan of care. Precautions: standard.    SOC: 23   FOTO: 10/19/23   POC Expiration: 23  Auth Expiration: 23   Daily Treatment Log:  Date 10/19/23        Visit # 22/24       Manual 10'       Trigger point release to R periscap LL       Prone PA mobs thoracic  LL       Ther Exer 30'        Pulleys  3'        Supine OH flex w/ cane 2x10 5"        Wall slides  1x10 5"       Seated pball 3 way str  1x10 5" ea       Seated T/S ext        Supine B ER TB YTB 1x10         Supine horz abd/add         Standing open books         B/L horz abd against wall         SNAGS rot+ext w/ towel         Doorway pec str 10"x10        Cane shldr ext bilat 1x10        Cane shldr abd  1x10       HEP        Ther Activ                                                        NMReed        B/L row Red tube       B/L shoulder ext  Red tube        Supine chin tuck 2x10        Seated t/s rotation w/foam between legs        Self trap release using lacrosse ball        Prone scap retr        Prone T        Modalities        MH During TE                            Access Code: [email or web address]  URL: [email or web address]  Date: 08/21/2023  Prepared by: Mick Pizarro    Exercises  - Supine Chin Tuck  - 2 x daily - 7 x weekly - 2 sets - 10 reps - 5 hold  - Supine Cervical Rotation AROM on Pillow  - 2 x daily - 7 x weekly - 2 sets - 10 reps  - Supine Shoulder Horizontal Abduction with Dumbbells  - 1 x daily - 7 x weekly - 3 sets - 10 reps  - Supine Shoulder Flexion with Dowel  - 1 x daily - 7 x weekly - 3 sets - 10 reps  - Cervical Extension AROM with Strap  - 2 x daily - 7 x weekly - 2 sets - 10 reps  - Seated Thoracic Lumbar Extension  - 1 x daily - 7 x weekly - 3 sets - 10 reps  - Shoulder Flexion Wall Slide with Towel  - 1 x daily - 7 x weekly - 3 sets - 10 reps

## 2023-10-30 ENCOUNTER — TELEPHONE (OUTPATIENT)
Dept: PHYSICAL THERAPY | Facility: CLINIC | Age: 43
End: 2023-10-30

## 2023-10-31 PROBLEM — M54.12 CERVICAL RADICULOPATHY: Status: ACTIVE | Noted: 2023-10-31

## 2023-11-02 ENCOUNTER — OFFICE VISIT (OUTPATIENT)
Dept: FAMILY MEDICINE CLINIC | Facility: CLINIC | Age: 43
End: 2023-11-02
Payer: COMMERCIAL

## 2023-11-02 ENCOUNTER — APPOINTMENT (OUTPATIENT)
Dept: RADIOLOGY | Facility: CLINIC | Age: 43
End: 2023-11-02
Payer: COMMERCIAL

## 2023-11-02 VITALS
DIASTOLIC BLOOD PRESSURE: 80 MMHG | HEART RATE: 83 BPM | WEIGHT: 141 LBS | BODY MASS INDEX: 25.95 KG/M2 | RESPIRATION RATE: 16 BRPM | TEMPERATURE: 97 F | OXYGEN SATURATION: 99 % | SYSTOLIC BLOOD PRESSURE: 120 MMHG | HEIGHT: 62 IN

## 2023-11-02 DIAGNOSIS — Z76.89 ENCOUNTER TO ESTABLISH CARE: ICD-10-CM

## 2023-11-02 DIAGNOSIS — Z13.220 SCREENING FOR LIPID DISORDERS: ICD-10-CM

## 2023-11-02 DIAGNOSIS — R07.89 STERNAL PAIN: ICD-10-CM

## 2023-11-02 DIAGNOSIS — E55.9 VITAMIN D DEFICIENCY: ICD-10-CM

## 2023-11-02 DIAGNOSIS — E61.1 IRON DEFICIENCY: ICD-10-CM

## 2023-11-02 DIAGNOSIS — N64.4 BREAST PAIN, RIGHT: ICD-10-CM

## 2023-11-02 DIAGNOSIS — R07.89 MUSCULAR CHEST PAIN: ICD-10-CM

## 2023-11-02 DIAGNOSIS — Z13.1 SCREENING FOR DIABETES MELLITUS (DM): ICD-10-CM

## 2023-11-02 DIAGNOSIS — M54.12 CERVICAL RADICULOPATHY: Primary | ICD-10-CM

## 2023-11-02 DIAGNOSIS — E78.1 HYPERTRIGLYCERIDEMIA: ICD-10-CM

## 2023-11-02 DIAGNOSIS — R79.89 ELEVATED LFTS: ICD-10-CM

## 2023-11-02 PROBLEM — F20.0 PARANOID SCHIZOPHRENIA (HCC): Status: ACTIVE | Noted: 2023-11-02

## 2023-11-02 PROBLEM — F20.0 PARANOID SCHIZOPHRENIA (HCC): Status: RESOLVED | Noted: 2023-11-02 | Resolved: 2023-11-02

## 2023-11-02 PROBLEM — M48.02 SPINAL STENOSIS, CERVICAL REGION: Status: ACTIVE | Noted: 2023-11-02

## 2023-11-02 PROCEDURE — 99204 OFFICE O/P NEW MOD 45 MIN: CPT | Performed by: NURSE PRACTITIONER

## 2023-11-02 PROCEDURE — 71046 X-RAY EXAM CHEST 2 VIEWS: CPT

## 2023-11-02 PROCEDURE — 36415 COLL VENOUS BLD VENIPUNCTURE: CPT | Performed by: NURSE PRACTITIONER

## 2023-11-02 PROCEDURE — 71120 X-RAY EXAM BREASTBONE 2/>VWS: CPT

## 2023-11-02 RX ORDER — TIZANIDINE 4 MG/1
4 TABLET ORAL EVERY 8 HOURS PRN
COMMUNITY
Start: 2023-05-16
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
## 2023-11-03 LAB
25(OH)D3+25(OH)D2 SERPL-MCNC: 21.3 NG/ML (ref 30–100)
ALBUMIN SERPL-MCNC: 4.3 G/DL (ref 3.9–4.9)
ALBUMIN/GLOB SERPL: 1.6 {RATIO} (ref 1.2–2.2)
ALP SERPL-CCNC: 51 IU/L (ref 44–121)
ALT SERPL-CCNC: 22 IU/L (ref 0–32)
AST SERPL-CCNC: 19 IU/L (ref 0–40)
BASOPHILS # BLD AUTO: 0.1 X10E3/UL (ref 0–0.2)
BASOPHILS NFR BLD AUTO: 1 %
BILIRUB SERPL-MCNC: <0.2 MG/DL (ref 0–1.2)
BUN SERPL-MCNC: 17 MG/DL (ref 6–24)
BUN/CREAT SERPL: 34 (ref 9–23)
CALCIUM SERPL-MCNC: 9 MG/DL (ref 8.7–10.2)
CHLORIDE SERPL-SCNC: 102 MMOL/L (ref 96–106)
CHOLEST SERPL-MCNC: 198 MG/DL (ref 100–199)
CHOLEST/HDLC SERPL: 4 RATIO (ref 0–4.4)
CO2 SERPL-SCNC: 25 MMOL/L (ref 20–29)
CREAT SERPL-MCNC: 0.5 MG/DL (ref 0.57–1)
EGFR: 122 ML/MIN/1.73
EOSINOPHIL # BLD AUTO: 0.3 X10E3/UL (ref 0–0.4)
EOSINOPHIL NFR BLD AUTO: 3 %
ERYTHROCYTE [DISTWIDTH] IN BLOOD BY AUTOMATED COUNT: 12.6 % (ref 11.7–15.4)
EST. AVERAGE GLUCOSE BLD GHB EST-MCNC: 100 MG/DL
GLOBULIN SER-MCNC: 2.7 G/DL (ref 1.5–4.5)
GLUCOSE SERPL-MCNC: 89 MG/DL (ref 70–99)
HBA1C MFR BLD: 5.1 % (ref 4.8–5.6)
HCT VFR BLD AUTO: 41 % (ref 34–46.6)
HDLC SERPL-MCNC: 49 MG/DL
HGB BLD-MCNC: 13.9 G/DL (ref 11.1–15.9)
IMM GRANULOCYTES # BLD: 0 X10E3/UL (ref 0–0.1)
IMM GRANULOCYTES NFR BLD: 0 %
IRON SERPL-MCNC: 79 UG/DL (ref 27–159)
LDLC SERPL CALC-MCNC: 114 MG/DL (ref 0–99)
LYMPHOCYTES # BLD AUTO: 2.5 X10E3/UL (ref 0.7–3.1)
LYMPHOCYTES NFR BLD AUTO: 29 %
MCH RBC QN AUTO: 30 PG (ref 26.6–33)
MCHC RBC AUTO-ENTMCNC: 33.9 G/DL (ref 31.5–35.7)
MCV RBC AUTO: 88 FL (ref 79–97)
MONOCYTES # BLD AUTO: 0.7 X10E3/UL (ref 0.1–0.9)
MONOCYTES NFR BLD AUTO: 9 %
NEUTROPHILS # BLD AUTO: 5 X10E3/UL (ref 1.4–7)
NEUTROPHILS NFR BLD AUTO: 58 %
PLATELET # BLD AUTO: 278 X10E3/UL (ref 150–450)
POTASSIUM SERPL-SCNC: 4.3 MMOL/L (ref 3.5–5.2)
PROT SERPL-MCNC: 7 G/DL (ref 6–8.5)
RBC # BLD AUTO: 4.64 X10E6/UL (ref 3.77–5.28)
SL AMB VLDL CHOLESTEROL CALC: 35 MG/DL (ref 5–40)
SODIUM SERPL-SCNC: 139 MMOL/L (ref 134–144)
TRIGL SERPL-MCNC: 198 MG/DL (ref 0–149)
WBC # BLD AUTO: 8.5 X10E3/UL (ref 3.4–10.8)

## 2023-11-09 ENCOUNTER — OFFICE VISIT (OUTPATIENT)
Dept: PHYSICAL THERAPY | Facility: CLINIC | Age: 43
End: 2023-11-09
Payer: COMMERCIAL

## 2023-11-09 DIAGNOSIS — M54.2 CHRONIC NECK PAIN: Primary | ICD-10-CM

## 2023-11-09 DIAGNOSIS — G89.29 CHRONIC NECK PAIN: Primary | ICD-10-CM

## 2023-11-09 PROCEDURE — 97110 THERAPEUTIC EXERCISES: CPT

## 2023-11-09 PROCEDURE — 97140 MANUAL THERAPY 1/> REGIONS: CPT

## 2023-11-09 NOTE — PROGRESS NOTES
Daily Note     Today's date: 2023  Patient name: Joseph Cobb  : 1980  MRN: [id number]  Referring provider: Gustavo Mejia*  Dx:   Encounter Diagnosis     ICD-10-CM    1. Chronic neck pain  M54.2     G89.29                      Subjective: pt reports that she noticed her soreness was increased with not coming to PT for the last couple weeks. She tried to use heat at home and do some of her exercises but she didn't do them very often. Objective: See treatment diary below      Assessment: Tolerated treatment well. Pt dem increased soreness with most exercises, this is not worse or better than prior to her lapse in care. Cont to progress mobility exercises as tolerated. Patient would benefit from continued PT      Plan: Continue per plan of care. Precautions: standard.    SOC: 23   FOTO: 10/19/23   POC Expiration: 23  Auth Expiration: 23   Daily Treatment Log:  Date 10/19/23  2023      Visit #       Manual 10' 5'      Trigger point release to R periscap LL       Prone PA mobs thoracic  LL RR       Ther Exer 30'  33'      Pulleys  3'  3'       Supine OH flex w/ cane 2x10 5"  5"x15      Wall slides  1x10 5" 5"x15       Seated pball 3 way str  1x10 5" ea 5"x15 ea      Seated T/S ext        Supine B ER TB YTB 1x10         Supine horz abd/add   15x       Standing open books   5"x10       B/L horz abd against wall         SNAGS rot+ext w/ towel         Doorway pec str 10"x10        Cane shldr ext bilat 1x10  1x10       Cane shldr abd  1x10 1x10       HEP        Ther Activ                                                        NMReed        B/L row Red tube       B/L shoulder ext  Red tube        Supine chin tuck 2x10        Seated t/s rotation w/foam between legs        Self trap release using lacrosse ball        Prone scap retr  3"x10       Prone T        Modalities        MH  During TE  During TE                           Access Code: [email or web address]  URL: [email or web address]  Date: 08/21/2023  Prepared by: Raoul Damon    Exercises  - Supine Chin Tuck  - 2 x daily - 7 x weekly - 2 sets - 10 reps - 5 hold  - Supine Cervical Rotation AROM on Pillow  - 2 x daily - 7 x weekly - 2 sets - 10 reps  - Supine Shoulder Horizontal Abduction with Dumbbells  - 1 x daily - 7 x weekly - 3 sets - 10 reps  - Supine Shoulder Flexion with Dowel  - 1 x daily - 7 x weekly - 3 sets - 10 reps  - Cervical Extension AROM with Strap  - 2 x daily - 7 x weekly - 2 sets - 10 reps  - Seated Thoracic Lumbar Extension  - 1 x daily - 7 x weekly - 3 sets - 10 reps  - Shoulder Flexion Wall Slide with Towel  - 1 x daily - 7 x weekly - 3 sets - 10 reps

## 2023-11-13 ENCOUNTER — OFFICE VISIT (OUTPATIENT)
Dept: PHYSICAL THERAPY | Facility: CLINIC | Age: 43
End: 2023-11-13
Payer: COMMERCIAL

## 2023-11-13 DIAGNOSIS — M54.2 CHRONIC NECK PAIN: Primary | ICD-10-CM

## 2023-11-13 DIAGNOSIS — G89.29 CHRONIC NECK PAIN: Primary | ICD-10-CM

## 2023-11-13 PROCEDURE — 97110 THERAPEUTIC EXERCISES: CPT

## 2023-11-13 PROCEDURE — 97112 NEUROMUSCULAR REEDUCATION: CPT

## 2023-11-13 PROCEDURE — 97140 MANUAL THERAPY 1/> REGIONS: CPT

## 2023-11-13 NOTE — PROGRESS NOTES
Daily Note     Today's date: 2023  Patient name: Yulissa Hamlin  : 1980  MRN: [id number]  Referring provider: Vonda Prince*  Dx:   Encounter Diagnosis     ICD-10-CM    1. Chronic neck pain  M54.2     G89.29             Start Time: 1530  Stop Time: 1615  Total time in clinic (min): 45 minutes    Subjective: Pt reports to today's session stating that she's bee feeling "so-so" adding that she is moving "much better" though pain does remain. She adds that this pain can vary in intensity, though prolonged UE activity tends to exacerbate these s/x. Objective: See treatment diary below    Treatment and documentation completed by Dennis DAY, under direct supervision and review of documentation completed by Frantz Carrera PTA. Assessment: Tolerated treatment well. Pt performed serratus punches in HL which did not increase pain or discomfort. Pt continues to find relief through manual therapy and PA mobs to the t/s, but was additionally educated on the importance of keeping up with her t/s mobility and not soley relying on MT to provide relief. Cont to progress mobility exercises as tolerated. Patient would benefit from continued PT      Plan: Continue per plan of care. Precautions: standard.    SOC: 23   FOTO: 10/19/23   POC Expiration: 23  Auth Expiration: 23   Daily Treatment Log:  Date 10/19/23  2023 11/13/23     Visit #  23     Manual 10' 5' 10'     Trigger point release to R periscap LL  SJ     Prone PA mobs thoracic  LL RR  SJ     Ther Exer 30'  33' 20'     Pulleys  3'  3'       Supine OH flex w/ cane 2x10 5"  5"x15 5"x15     Wall slides  1x10 5" 5"x15       Seated pball 3 way str  1x10 5" ea 5"x15 ea 5"x15 ea     Seated T/S ext   10x R/L w/add     Supine B ER TB YTB 1x10         Supine horz abd/add   15x       Standing open books   5"x10       B/L horz abd against wall         SNAGS rot+ext w/ towel         Doorway pec str 10"x10   10"x10 Cane shldr ext bilat 1x10  1x10       Cane shldr abd  1x10 1x10       HEP        Ther Activ                                                        NMReed   10'     Serratus punches in HL   2# db, 10x     B/L row Red tube       B/L shoulder ext  Red tube        Supine chin tuck 2x10   2x10      Self trap release using lacrosse ball        Prone scap retr  3"x10       Prone T        Modalities        MH  During TE  During TE  During TE                         Access Code: [email or web address]  URL: [email or web address]  Date: 08/21/2023  Prepared by: Vince Barton    Exercises  - Supine Chin Tuck  - 2 x daily - 7 x weekly - 2 sets - 10 reps - 5 hold  - Supine Cervical Rotation AROM on Pillow  - 2 x daily - 7 x weekly - 2 sets - 10 reps  - Supine Shoulder Horizontal Abduction with Dumbbells  - 1 x daily - 7 x weekly - 3 sets - 10 reps  - Supine Shoulder Flexion with Dowel  - 1 x daily - 7 x weekly - 3 sets - 10 reps  - Cervical Extension AROM with Strap  - 2 x daily - 7 x weekly - 2 sets - 10 reps  - Seated Thoracic Lumbar Extension  - 1 x daily - 7 x weekly - 3 sets - 10 reps  - Shoulder Flexion Wall Slide with Towel  - 1 x daily - 7 x weekly - 3 sets - 10 reps

## 2023-11-14 ENCOUNTER — OFFICE VISIT (OUTPATIENT)
Dept: FAMILY MEDICINE CLINIC | Facility: CLINIC | Age: 43
End: 2023-11-14
Payer: COMMERCIAL

## 2023-11-14 VITALS
TEMPERATURE: 96 F | WEIGHT: 144 LBS | SYSTOLIC BLOOD PRESSURE: 120 MMHG | DIASTOLIC BLOOD PRESSURE: 80 MMHG | OXYGEN SATURATION: 100 % | HEIGHT: 61 IN | RESPIRATION RATE: 16 BRPM | HEART RATE: 74 BPM | BODY MASS INDEX: 27.19 KG/M2

## 2023-11-14 DIAGNOSIS — R07.89 MUSCULAR CHEST PAIN: Primary | ICD-10-CM

## 2023-11-14 DIAGNOSIS — E55.9 VITAMIN D DEFICIENCY: ICD-10-CM

## 2023-11-14 DIAGNOSIS — M54.12 CERVICAL RADICULOPATHY: ICD-10-CM

## 2023-11-14 DIAGNOSIS — N64.4 BREAST PAIN, RIGHT: ICD-10-CM

## 2023-11-14 DIAGNOSIS — E78.1 HYPERTRIGLYCERIDEMIA: ICD-10-CM

## 2023-11-14 DIAGNOSIS — R07.89 STERNAL PAIN: ICD-10-CM

## 2023-11-14 PROCEDURE — 99214 OFFICE O/P EST MOD 30 MIN: CPT | Performed by: NURSE PRACTITIONER

## 2023-11-14 NOTE — PATIENT INSTRUCTIONS
Start taking over the counter vitamin D3, 1000 units daily  Heart healthy, carbohydrate controlled diet- limit red meat, limit saturated fat, moderate salt intake, limit junk food, etc.   Regular exercise  Stress management  Routine labwork and screenings as ordered.    Mammogram to be scheduled as discussed

## 2023-11-14 NOTE — PROGRESS NOTES
Name: Andie Russo      : 1980      MRN: [id number]  Encounter Provider: JOSE Quinn  Encounter Date: 2023   Encounter department: [address] [place] [address]   1. Muscular chest pain  Xray normal  Continue tx with PT and ortho     2. Sternal pain  Xray normal  Continue tx with PT and ortho     3. Breast pain, right  Mammo as directed. 4. Hypertriglyceridemia  Heart healthy diet. monitor    5. Vitamin D deficiency  Start otc vitamin D3 supplementation    6. Cervical radiculopathy  Management per ortho and pain management. Continue PT           Subjective      Here to review labs/diagnostics  Pt seen last week in office as new patient  C/o mid chest /muscle pain and sternal and breast pain. Xrays normal. Mammo ordered. Did not schedule. Pt has ongoing neck and radicular pain. Seeing pain management, ortho, and PT. No other specific issues or concerns. Review of Systems   Constitutional:  Negative for fatigue. Eyes:         Wears glasses   Respiratory:  Negative for shortness of breath. Cardiovascular:  Negative for palpitations. Gastrointestinal:  Negative for abdominal pain. Musculoskeletal:  Positive for neck pain. Muscular chest pain  Neck pain, following with PT and ortho     Skin:  Negative for rash and wound. Allergic/Immunologic: Negative for immunocompromised state. Neurological:  Negative for dizziness and headaches.        Current Outpatient Medications on File Prior to Visit   Medication Sig    gabapentin (NEURONTIN) 400 mg capsule Take 1 capsule (400 mg total) by mouth daily at bedtime    meloxicam (MOBIC) 15 mg tablet Take 1 tablet (15 mg total) by mouth daily    methocarbamol (ROBAXIN) 750 mg tablet Take 1 tablet (750 mg total) by mouth every 6 (six) hours as needed for muscle spasms    tiZANidine (ZANAFLEX) 4 mg tablet Take 4 mg by mouth every 8 (eight) hours as needed       Objective     Recent Results (from the past 672 hour(s))   CBC and differential    Collection Time: 11/02/23  4:36 PM   Result Value Ref Range    White Blood Cell Count 8.5 3.4 - 10.8 x10E3/uL    Red Blood Cell Count 4.64 3.77 - 5.28 x10E6/uL    Hemoglobin 13.9 11.1 - 15.9 g/dL    HCT 41.0 34.0 - 46.6 %    MCV 88 79 - 97 fL    MCH 30.0 26.6 - 33.0 pg    MCHC 33.9 31.5 - 35.7 g/dL    RDW 12.6 11.7 - 15.4 %    Platelet Count 648 183 - 450 x10E3/uL    Neutrophils 58 Not Estab. %    Lymphocytes 29 Not Estab. %    Monocytes 9 Not Estab. %    Eosinophils 3 Not Estab. %    Basophils PCT 1 Not Estab. %    Neutrophils (Absolute) 5.0 1.4 - 7.0 x10E3/uL    Lymphocytes (Absolute) 2.5 0.7 - 3.1 x10E3/uL    Monocytes (Absolute) 0.7 0.1 - 0.9 x10E3/uL    Eosinophils (Absolute) 0.3 0.0 - 0.4 x10E3/uL    Basophils ABS 0.1 0.0 - 0.2 x10E3/uL    Immature Granulocytes 0 Not Estab. %    Immature Granulocytes (Absolute) 0.0 0.0 - 0.1 x10E3/uL   Comprehensive metabolic panel    Collection Time: 11/02/23  4:36 PM   Result Value Ref Range    Glucose, Random 89 70 - 99 mg/dL    BUN 17 6 - 24 mg/dL    Creatinine 0.50 (L) 0.57 - 1.00 mg/dL    eGFR 122 >59 mL/min/1.73    SL AMB BUN/CREATININE RATIO 34 (H) 9 - 23    Sodium 139 134 - 144 mmol/L    Potassium 4.3 3.5 - 5.2 mmol/L    Chloride 102 96 - 106 mmol/L    CO2 25 20 - 29 mmol/L    CALCIUM 9.0 8.7 - 10.2 mg/dL    Protein, Total 7.0 6.0 - 8.5 g/dL    Albumin 4.3 3.9 - 4.9 g/dL    Globulin, Total 2.7 1.5 - 4.5 g/dL    Albumin/Globulin Ratio 1.6 1.2 - 2.2    TOTAL BILIRUBIN <0.2 0.0 - 1.2 mg/dL    Alk Phos Isoenzymes 51 44 - 121 IU/L    AST 19 0 - 40 IU/L    ALT 22 0 - 32 IU/L   Hemoglobin A1C    Collection Time: 11/02/23  4:36 PM   Result Value Ref Range    Hemoglobin A1C 5.1 4.8 - 5.6 %    Estimated Average Glucose 100 mg/dL   Lipid panel    Collection Time: 11/02/23  4:36 PM   Result Value Ref Range    Cholesterol, Total 198 100 - 199 mg/dL    Triglycerides 198 (H) 0 - 149 mg/dL    HDL 49 >39 mg/dL    VLDL Cholesterol Calculated 35 5 - 40 mg/dL    LDL Calculated 114 (H) 0 - 99 mg/dL    T. Chol/HDL Ratio 4.0 0.0 - 4.4 ratio   Vitamin D 25 hydroxy    Collection Time: 11/02/23  4:36 PM   Result Value Ref Range    25-HYDROXY VIT D 21.3 (L) 30.0 - 100.0 ng/mL   Iron    Collection Time: 11/02/23  4:36 PM   Result Value Ref Range    Iron, Serum 79 27 - 159 ug/dL   Xray sternum 11/2  FINDINGS:   There is no acute fracture or pathologic bone lesion. The retrosternal clear space is maintained. Soft tissues are unremarkable. IMPRESSION:   No fracture. Xray chest 11/2  FINDINGS:   Cardiomediastinal silhouette normal.   Lungs clear. No effusion or pneumothorax. Upper abdomen normal. Bones normal for age. IMPRESSION:   No acute cardiopulmonary disease. Reviewed lab/diagnostic results with pt including both normal and abnormal findings. In depth counseling and instructions given. All questions answered during visit. /80 (BP Location: Right arm, Patient Position: Sitting, Cuff Size: Standard)   Pulse 74   Temp (!) 96 °F (35.6 °C)   Resp 16   Ht 5' 1" (1.549 m)   Wt 65.3 kg (144 lb)   SpO2 100%   BMI 27.21 kg/m²     Physical Exam  Vitals reviewed. Constitutional:       General: She is not in acute distress. Appearance: She is not ill-appearing. Cardiovascular:      Rate and Rhythm: Normal rate and regular rhythm. Pulmonary:      Effort: Pulmonary effort is normal.      Breath sounds: Normal breath sounds. Abdominal:      General: There is no distension. Palpations: Abdomen is soft. Musculoskeletal:      Cervical back: Normal range of motion. Skin:     General: Skin is warm and dry. Coloration: Skin is not jaundiced or pale. Neurological:      General: No focal deficit present. Mental Status: She is alert and oriented to person, place, and time. Cranial Nerves: No cranial nerve deficit. Sensory: No sensory deficit.    Psychiatric:         Mood and Affect: Mood normal. Behavior: Behavior normal.         Thought Content:  Thought content normal.         Judgment: Judgment normal.       Virginial Points

## 2023-11-15 ENCOUNTER — TELEPHONE (OUTPATIENT)
Dept: ADMINISTRATIVE | Facility: OTHER | Age: 43
End: 2023-11-15

## 2023-11-15 NOTE — TELEPHONE ENCOUNTER
----- Message from Hunter Govea sent at 11/14/2023  4:02 PM EST -----  Regarding: HIV and Hep C screenings  11/14/23 4:02 PM    Hello, our patient attached above has had Hepatitis C and HIV completed/performed. Please assist in updating the patient chart by pulling the document from lab Tab within Chart Review. The date of service is 2022.      Thank you,  Hunter [facility]

## 2023-11-15 NOTE — TELEPHONE ENCOUNTER
Upon review of the In Basket request we were able to locate, review, and update the patient chart as requested for Hepatitis C  and HIV. Any additional questions or concerns should be emailed to the Practice Liaisons via the appropriate education email address, please do not reply via In Basket.     Thank you  Kenyon Griffith

## 2023-11-17 DIAGNOSIS — M54.12 CERVICAL RADICULOPATHY: ICD-10-CM

## 2023-11-17 RX ORDER — GABAPENTIN 400 MG/1
400 CAPSULE ORAL
Qty: 30 CAPSULE | Refills: 0 | Status: SHIPPED | OUTPATIENT
Start: 2023-11-17 | End: 2024-01-16

## 2023-11-20 ENCOUNTER — OFFICE VISIT (OUTPATIENT)
Dept: PHYSICAL THERAPY | Facility: CLINIC | Age: 43
End: 2023-11-20
Payer: COMMERCIAL

## 2023-11-20 DIAGNOSIS — G89.29 CHRONIC NECK PAIN: Primary | ICD-10-CM

## 2023-11-20 DIAGNOSIS — M54.2 CHRONIC NECK PAIN: Primary | ICD-10-CM

## 2023-11-20 PROCEDURE — 97140 MANUAL THERAPY 1/> REGIONS: CPT

## 2023-11-20 PROCEDURE — 97112 NEUROMUSCULAR REEDUCATION: CPT

## 2023-11-20 PROCEDURE — 97110 THERAPEUTIC EXERCISES: CPT

## 2023-11-20 NOTE — PROGRESS NOTES
Daily Note     Today's date: 2023  Patient name: Modesto Waite  : 1980  MRN: [id number]  Referring provider: Olivia Arechiga*  Dx:   Encounter Diagnosis     ICD-10-CM    1. Chronic neck pain  M54.2     G89.29                        Subjective: Pt reports she feels tired and sore roberto w/ returning to work. Notes tension in her neck. Objective: See treatment diary below      Assessment: Tolerated treatment well, c/o chest pain t/o session that dec post. Patient w/ continued painful and limited shoulder and neck AROM but improving mobility overall noted. Patient demonstrated fatigue post treatment and would benefit from continued PT. Plan: Continue per plan of care. Precautions: standard.    SOC: 23   FOTO:   POC Expiration: 23  Auth Expiration: 23   Daily Treatment Log:  Date 10/19/23  2023 11/13/23 11/20/23    Visit #     Manual 10' 5' 10' 10'    Trigger point release to R periscap LL  SJ LL    Prone PA mobs thoracic  LL RR  SJ LL    Ther Exer 30'  33' 20' 20'    Pulleys  3'  3'       Supine OH flex w/ cane 2x10 5"  5"x15 5"x15 5"x15     Wall slides  1x10 5" 5"x15       Seated pball 3 way str  1x10 5" ea 5"x15 ea 5"x15 ea 5"x15 ea    Seated T/S ext   10x R/L w/add 1x10 w/ add R/L    Supine B ER TB YTB 1x10         Supine horz abd/add   15x       Standing open books   5"x10       B/L horz abd against wall         Doorway pec str 10"x10   10"x10  10"x10     Cane shldr ext bilat 1x10  1x10       Cane shldr abd  1x10 1x10   1x10 B/L     HEP        Ther Activ                                                        NMReed   10' 10'    Serratus punches in HL   2# db, 10x 2# 2x10      B/L row Red tube       B/L shoulder ext  Red tube        Supine chin tuck 2x10   2x10  2x10     Self trap release using lacrosse ball        Prone scap retr  3"x10       Prone T        Modalities        MH  During TE  During TE  During TE During TE Access Code: [email or web address]  URL: [email or web address]  Date: 08/21/2023  Prepared by: Estrella Broken Bow    Exercises  - Supine Chin Tuck  - 2 x daily - 7 x weekly - 2 sets - 10 reps - 5 hold  - Supine Cervical Rotation AROM on Pillow  - 2 x daily - 7 x weekly - 2 sets - 10 reps  - Supine Shoulder Horizontal Abduction with Dumbbells  - 1 x daily - 7 x weekly - 3 sets - 10 reps  - Supine Shoulder Flexion with Dowel  - 1 x daily - 7 x weekly - 3 sets - 10 reps  - Cervical Extension AROM with Strap  - 2 x daily - 7 x weekly - 2 sets - 10 reps  - Seated Thoracic Lumbar Extension  - 1 x daily - 7 x weekly - 3 sets - 10 reps  - Shoulder Flexion Wall Slide with Towel  - 1 x daily - 7 x weekly - 3 sets - 10 reps

## 2023-11-27 ENCOUNTER — OFFICE VISIT (OUTPATIENT)
Dept: PHYSICAL THERAPY | Facility: CLINIC | Age: 43
End: 2023-11-27
Payer: COMMERCIAL

## 2023-11-27 DIAGNOSIS — M54.2 CHRONIC NECK PAIN: Primary | ICD-10-CM

## 2023-11-27 DIAGNOSIS — G89.29 CHRONIC NECK PAIN: Primary | ICD-10-CM

## 2023-11-27 PROCEDURE — 97110 THERAPEUTIC EXERCISES: CPT

## 2023-11-27 PROCEDURE — 97112 NEUROMUSCULAR REEDUCATION: CPT

## 2023-11-27 PROCEDURE — 97140 MANUAL THERAPY 1/> REGIONS: CPT

## 2023-11-27 NOTE — PROGRESS NOTES
Daily Note     Today's date: 2023  Patient name: Niki Shaver  : 1980  MRN: [id number]  Referring provider: Arnoldo Stovall*  Dx:   Encounter Diagnosis     ICD-10-CM    1. Chronic neck pain  M54.2     G89.29                        Subjective: Patient reports similar c/o wtih regards to her neck and shoulders, states there is no reason to c/o because it doesn't change things. Objective: See treatment diary below      Assessment: Tolerated treatment well, improving tolerance to cervical/thoracic/shoulder mobility and strength. Patient demonstrated fatigue post treatment and would benefit from continued PT. Plan: Continue per plan of care. Precautions: standard.    SOC: 23   FOTO: 23   POC Expiration: 23  Auth Expiration: 23   Daily Treatment Log:  Date 10/19/23  2023 11/13/23 11/20/23 11/27/23    Visit #  FOTO   Manual 10' 5' 10' 10'    Trigger point release to R periscap LL  SJ LL    Prone PA mobs thoracic  LL RR  SJ LL    Ther Exer 30'  33' 20' 20'    Pulleys  3'  3'    3'    Supine OH flex w/ cane 2x10 5"  5"x15 5"x15 5"x15  5"x15    Wall slides  1x10 5" 5"x15    5"x15    Seated pball 3 way str  1x10 5" ea 5"x15 ea 5"x15 ea 5"x15 ea 5"x15ea    Seated T/S ext   10x R/L w/add 1x10 w/ add R/L    Supine B ER TB YTB 1x10      YTB 2x10    Supine horz abd/add   15x       Standing open books   5"x10       Doorway pec str 10"x10   10"x10  10"x10  10"x10    Cane shldr ext bilat 1x10  1x10    1x10    Cane shldr abd  1x10 1x10   1x10 B/L  1x10 B/L   HEP        Ther Activ                                                        NMReed   10' 10'    Serratus punches in HL   2# db, 10x 2# 2x10   2# 2x10    B/L row Red tube    Green 2x10   B/L shoulder ext  Red tube     Green 2x10    Supine chin tuck 2x10   2x10  2x10  2x10    Prone scap retr  3"x10       Prone T        Modalities        MH  During TE  During TE  During TE During TE Access Code: [email or web address]  URL: [email or web address]  Date: 08/21/2023  Prepared by: Chelly Click    Exercises  - Supine Chin Tuck  - 2 x daily - 7 x weekly - 2 sets - 10 reps - 5 hold  - Supine Cervical Rotation AROM on Pillow  - 2 x daily - 7 x weekly - 2 sets - 10 reps  - Supine Shoulder Horizontal Abduction with Dumbbells  - 1 x daily - 7 x weekly - 3 sets - 10 reps  - Supine Shoulder Flexion with Dowel  - 1 x daily - 7 x weekly - 3 sets - 10 reps  - Cervical Extension AROM with Strap  - 2 x daily - 7 x weekly - 2 sets - 10 reps  - Seated Thoracic Lumbar Extension  - 1 x daily - 7 x weekly - 3 sets - 10 reps  - Shoulder Flexion Wall Slide with Towel  - 1 x daily - 7 x weekly - 3 sets - 10 reps

## 2023-12-04 ENCOUNTER — APPOINTMENT (OUTPATIENT)
Dept: PHYSICAL THERAPY | Facility: CLINIC | Age: 43
End: 2023-12-04
Payer: COMMERCIAL

## 2023-12-05 ENCOUNTER — OFFICE VISIT (OUTPATIENT)
Dept: PHYSICAL THERAPY | Facility: CLINIC | Age: 43
End: 2023-12-05
Payer: COMMERCIAL

## 2023-12-05 DIAGNOSIS — M54.2 CHRONIC NECK PAIN: Primary | ICD-10-CM

## 2023-12-05 DIAGNOSIS — G89.29 CHRONIC NECK PAIN: Primary | ICD-10-CM

## 2023-12-05 PROCEDURE — 97110 THERAPEUTIC EXERCISES: CPT

## 2023-12-05 PROCEDURE — 97112 NEUROMUSCULAR REEDUCATION: CPT

## 2023-12-05 NOTE — PROGRESS NOTES
Daily Note     Today's date: 2023  Patient name: Gael Toro  : 1980  MRN: [id number]  Referring provider: Leilani Luque*  Dx:   Encounter Diagnosis     ICD-10-CM    1. Chronic neck pain  M54.2     G89.29                          Subjective: Patient reports similar tightness presenting across her chest and b/l shoulders, while noting mild-mod soreness in her mid back. She adds that this is more discomfort than pain at this point in time. Objective: See treatment diary below      Assessment: Tolerated treatment well, requiring VCs for excessive c/s movement during t/s extension which was leading to pain. Patient demonstrated fatigue post treatment and would benefit from continued PT. Plan: Continue per plan of care. Precautions: standard.    SOC: 23   FOTO: 23   POC Expiration: 23  Auth Expiration: 23   Daily Treatment Log:  Date 23    Visit #  FOTO   Manual 10' 5' 10' 10'    Trigger point release to R periscap SJ  SJ LL    Prone PA mobs thoracic  SJ RR  SJ LL    Sub-occipital release SJ       Ther Exer 20' 33' 20' 20'    Pulleys   3'    3'    Supine OH flex w/ cane 5"x15 5"x15 5"x15 5"x15  5"x15    Wall slides  5"x15  5"x15    5"x15    Seated pball 3 way str  5"x15 ea 5"x15 ea  5"x15 ea 5"x15ea    Seated T/S ext 10x 3s  10x R/L w/add 1x10 w/ add R/L    Supine B ER TB YTB, 10x    YTB 2x10    Supine horz abd/add   15x       Standing open books  5"x10  5"x10       Doorway pec str 10"x10   10"x10  10"x10  10"x10    Cane shldr ext bilat  1x10    1x10    Cane shldr abd   1x10   1x10 B/L  1x10 B/L   HEP        Ther Activ                                                        NMReed 15'  10' 10'    Serratus punches in HL 2# db, 10x  2# db, 10x 2# 2x10   2# 2x10    B/L row GTB, 2x10    Green 2x10   B/L shoulder ext  GTB, 2x10    Green 2x10    Supine chin tuck   2x10  2x10  2x10    Prone scap retr  3"x10       Prone T        Modalities        MH  During TE During TE  During TE During TE                         Access Code: [email or web address]  URL: [email or web address]  Date: 08/21/2023  Prepared by: Sherif Schrader    Exercises  - Supine Chin Tuck  - 2 x daily - 7 x weekly - 2 sets - 10 reps - 5 hold  - Supine Cervical Rotation AROM on Pillow  - 2 x daily - 7 x weekly - 2 sets - 10 reps  - Supine Shoulder Horizontal Abduction with Dumbbells  - 1 x daily - 7 x weekly - 3 sets - 10 reps  - Supine Shoulder Flexion with Dowel  - 1 x daily - 7 x weekly - 3 sets - 10 reps  - Cervical Extension AROM with Strap  - 2 x daily - 7 x weekly - 2 sets - 10 reps  - Seated Thoracic Lumbar Extension  - 1 x daily - 7 x weekly - 3 sets - 10 reps  - Shoulder Flexion Wall Slide with Towel  - 1 x daily - 7 x weekly - 3 sets - 10 reps

## 2023-12-11 ENCOUNTER — APPOINTMENT (OUTPATIENT)
Dept: PHYSICAL THERAPY | Facility: CLINIC | Age: 43
End: 2023-12-11
Payer: COMMERCIAL

## 2023-12-11 ENCOUNTER — TELEPHONE (OUTPATIENT)
Dept: PHYSICAL THERAPY | Facility: CLINIC | Age: 43
End: 2023-12-11

## 2023-12-13 ENCOUNTER — OFFICE VISIT (OUTPATIENT)
Dept: PAIN MEDICINE | Facility: CLINIC | Age: 43
End: 2023-12-13
Payer: COMMERCIAL

## 2023-12-13 VITALS
SYSTOLIC BLOOD PRESSURE: 112 MMHG | HEART RATE: 85 BPM | BODY MASS INDEX: 27.02 KG/M2 | DIASTOLIC BLOOD PRESSURE: 68 MMHG | WEIGHT: 143 LBS | TEMPERATURE: 98.2 F

## 2023-12-13 DIAGNOSIS — M25.511 CHRONIC RIGHT SHOULDER PAIN: ICD-10-CM

## 2023-12-13 DIAGNOSIS — G89.4 CHRONIC PAIN SYNDROME: ICD-10-CM

## 2023-12-13 DIAGNOSIS — G89.29 CHRONIC RIGHT SHOULDER PAIN: ICD-10-CM

## 2023-12-13 DIAGNOSIS — M79.18 MYOFASCIAL PAIN SYNDROME: ICD-10-CM

## 2023-12-13 DIAGNOSIS — M54.12 CERVICAL RADICULOPATHY: Primary | ICD-10-CM

## 2023-12-13 PROCEDURE — 99214 OFFICE O/P EST MOD 30 MIN: CPT | Performed by: STUDENT IN AN ORGANIZED HEALTH CARE EDUCATION/TRAINING PROGRAM

## 2023-12-13 NOTE — PROGRESS NOTES
Pain Medicine Follow-Up Note    Assessment:  1. Cervical radiculopathy    2. Chronic right shoulder pain    3. Chronic pain syndrome    4. Myofascial pain syndrome      Patient is a pleasant 53-year-old woman who returns as a follow-up visit after last being seen on 10/18/2023 for myofascial pain in the cervical paraspinal muscles along with cervical radiculopathy. Today patient states her symptoms are the same rating her pain is 8 out of 10 on numeric rating scale. The pain is primarily in her right cervical paraspinal muscles radiating into her skull. The pain is worse in the morning in the evening and the pain is constant, occurring 100% at a time. The quality pain is a pressure-like pain. She does report receiving significant benefit from her current pain regimen the which includes Mobic and Robaxin which provide 25% relief. She also takes gabapentin 400 mg nightly. On exam patient with tenderness to palpation in the right cervical paraspinal muscles with taut bands appreciated and positive jump sign. On further discussion with patient she states that activity she does with physical therapy reading muscle manipulation provides her with the most benefit. Given this patient symptoms likely myofascial though she does have cervical disc disease with stenosis as well which likely contributing to her symptoms. Given this for symptomatic relief we will schedule patient for right cervical paraspinal muscle trigger point injections under palpation with local anesthetic and steroid. Patient counseled risk and benefits of this injection would like to proceed. Additionally for symptomatic relief we will continue Robaxin-750 milligrams every 6 hours as needed and meloxicam 15 mg daily. Lastly patient to continue gabapentin 400 mg at bedtime.   My hope is that once the patient is able to get significant benefit from injection therapy she can wean off gabapentin which she states caused her to have a hangover effect which is distressing. Additionally, patient continues to have right shoulder pain that has been going on for the past couple months but x-ray of the right shoulder on 10/18/2023 which showed mild osteoarthritis of the glenohumeral joint. She continues to complain of severe pain in this right shoulder with tenderness to palpation. To further evaluate for rotator cuff pathology will order MRI of the right shoulder today. Patient amenable to this plan. Plan:  MRI of Right shoulder given 8 weeks of conservative care and continued shoulder pain  Schedule for TPI of cervical paraspinal muscles  Continue gabapentin 400 mg nightly   Continue robaxin 750 mg q6hrs prn  Continue meloxicam 15 mg daily prn   Orders Placed This Encounter   Procedures   • MRI shoulder right without contrast     Standing Status:   Future     Standing Expiration Date:   12/13/2027     Scheduling Instructions: There is no preparation for this test. Please leave your jewelry and valuables at home, wedding rings are the exception. All patients will be required to change into a hospital gown and pants. Street clothes are not permitted in the MRI. Magnetic nail polish must be removed prior to arrival for your test. Please bring your insurance cards, a form of photo ID and a list of your medications with you. Arrive 15 minutes prior to your appointment time in order to register. Please bring any prior CT or MRI studies of this area that were not performed at a [facility] facility. To schedule this appointment, please contact Central Scheduling at [phone]. Prior to your appointment, please make sure you complete the MRI Screening Form when you e-Check in for your appointment. This will be available starting 7 days before your appointment in [address] [place] [address]. You may receive an e-mail with an activation code if you do not have a [employer] account.  If you do not have access to a device, we will complete your screening at your appointment. Order Specific Question:   What is the patient's sedation requirement? If Medication for Claustrophobia is selected, order medication at this point. Answer:   No Sedation     Order Specific Question:   Is the patient pregnant? Answer:   No     Order Specific Question:   Does this procedure require the 3T MRI at [place] or [place]?     Answer:   No     Order Specific Question:   [facility]     Answer:   Immediate     Order Specific Question:   Is order priority selected as STAT? Answer:   No     Order Specific Question:   Reason for Exam (FREE TEXT)     Answer:   R shoulder pain with failure of 8 weeks of conservative management     Order Specific Question:   When should the test be performed? Answer:   Urgent- less than one week         No orders of the defined types were placed in this encounter. My impressions and treatment recommendations were discussed in detail with the patient who verbalized understanding and had no further questions. Complete risks and benefits including bleeding, infection, tissue reaction, nerve injury and allergic reaction were discussed. The approach was demonstrated using models and literature was provided. Verbal and written consent was obtained. Follow-up is planned in eight weeks time or sooner as warranted. Discharge instructions were provided. I personally saw and examined the patient and I agree with the above discussed plan of care. History of Present Illness:    Shanika Oh is a 41 y.o. female who presents to [address] for interval re-evaluation of the above stated pain complaints. The patient has a past medical and chronic pain history as outlined in the assessment section. She was last seen on 10/18/2023.     Patient is a pleasant 47-year-old woman who returns as a follow-up visit after last being seen on 10/18/2023 for myofascial pain in the cervical paraspinal muscles along with cervical radiculopathy. Today patient states her symptoms are the same rating her pain is 8 out of 10 on numeric rating scale. The pain is primarily in her right cervical paraspinal muscles radiating into her skull. The pain is worse in the morning in the evening and the pain is constant, occurring 100% at a time. The quality pain is a pressure-like pain. She does report receiving significant benefit from her current pain regimen the which includes Mobic and Robaxin which provide 25% relief. She discontinued gabapentin as she did not know if it was helping. Other than as stated above, the patient denies any interval changes in medications, medical condition, mental condition, symptoms, or allergies since the last office visit. Review of Systems:    Review of Systems   Constitutional:  Negative for unexpected weight change. HENT:  Negative for ear pain. Eyes:  Negative for visual disturbance. Respiratory:  Negative for shortness of breath and wheezing. Gastrointestinal:  Negative for abdominal pain. Musculoskeletal:  Positive for neck pain and neck stiffness. Negative for back pain. Neurological:  Positive for weakness, light-headedness and numbness. Psychiatric/Behavioral:  Positive for sleep disturbance. Negative for decreased concentration. History reviewed. No pertinent past medical history. History reviewed. No pertinent surgical history.     Family History   Problem Relation Age of Onset   • Diabetes Mother        Social History     Occupational History   • Not on file   Tobacco Use   • Smoking status: Never   • Smokeless tobacco: Never   Vaping Use   • Vaping status: Never Used   Substance and Sexual Activity   • Alcohol use: Never   • Drug use: Never   • Sexual activity: Not on file         Current Outpatient Medications:   •  gabapentin (NEURONTIN) 400 mg capsule, TAKE 1 CAPSULE (400 MG TOTAL) BY MOUTH DAILY AT BEDTIME, Disp: 30 capsule, Rfl: 0  •  meloxicam (MOBIC) 15 mg tablet, Take 1 tablet (15 mg total) by mouth daily, Disp: 30 tablet, Rfl: 1  •  methocarbamol (ROBAXIN) 750 mg tablet, Take 1 tablet (750 mg total) by mouth every 6 (six) hours as needed for muscle spasms, Disp: 120 tablet, Rfl: 1  •  tiZANidine (ZANAFLEX) 4 mg tablet, Take 4 mg by mouth every 8 (eight) hours as needed, Disp: , Rfl:     No Known Allergies    Physical Exam:    /68   Pulse 85   Temp 98.2 °F (36.8 °C)   Wt 64.9 kg (143 lb)   BMI 27.02 kg/m²     Constitutional:normal, well developed, well nourished, alert, in no distress and non-toxic and no overt pain behavior. Eyes:anicteric  HEENT:grossly intact  Neck:supple, symmetric, trachea midline and no masses   Pulmonary:even and unlabored  Cardiovascular:No edema or pitting edema present  Skin:Normal without rashes or lesions and well hydrated  Psychiatric:Mood and affect appropriate  Neurologic:Cranial Nerves II-XII grossly intact  Musculoskeletal:normal gait. TTP in right cervical paraspinal muscles with taut bands and positive Jump sign. Pain with palpation of the right shoulder with limited range of motion. Imaging  XR R shoulder 10/18/2023  FINDINGS:     There is no acute fracture or dislocation. Mild osteoarthritis glenohumeral joint. No lytic or blastic osseous lesion. Soft tissues are unremarkable. IMPRESSION:     No acute osseous abnormality. Degenerative changes as described.   MRI shoulder right without contrast    (Results Pending)         Orders Placed This Encounter   Procedures   • MRI shoulder right without contrast

## 2023-12-18 ENCOUNTER — EVALUATION (OUTPATIENT)
Dept: PHYSICAL THERAPY | Facility: CLINIC | Age: 43
End: 2023-12-18
Payer: COMMERCIAL

## 2023-12-18 DIAGNOSIS — M54.2 CHRONIC NECK PAIN: Primary | ICD-10-CM

## 2023-12-18 DIAGNOSIS — G89.29 CHRONIC NECK PAIN: Primary | ICD-10-CM

## 2023-12-18 PROCEDURE — 97110 THERAPEUTIC EXERCISES: CPT

## 2023-12-18 PROCEDURE — 97140 MANUAL THERAPY 1/> REGIONS: CPT
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
## 2023-12-27 ENCOUNTER — TELEPHONE (OUTPATIENT)
Age: 43
End: 2023-12-27

## 2023-12-27 NOTE — TELEPHONE ENCOUNTER
Caller: patrick Calderon    Doctor: Marlo    Reason for call: pt needs cancel procedure scheduled for 12/28/23. Pt stated she has COVID. Pt would like to reschedule Please Advise    Call back#: [phone]

## 2024-01-03 ENCOUNTER — TELEPHONE (OUTPATIENT)
Age: 44
End: 2024-01-03

## 2024-01-03 ENCOUNTER — HOSPITAL ENCOUNTER (OUTPATIENT)
Dept: RADIOLOGY | Facility: HOSPITAL | Age: 44
Discharge: HOME | End: 2024-01-03
Attending: STUDENT IN AN ORGANIZED HEALTH CARE EDUCATION/TRAINING PROGRAM
Payer: COMMERCIAL

## 2024-01-03 DIAGNOSIS — G89.29 CHRONIC RIGHT SHOULDER PAIN: ICD-10-CM

## 2024-01-03 DIAGNOSIS — M25.511 CHRONIC RIGHT SHOULDER PAIN: ICD-10-CM

## 2024-01-03 PROCEDURE — 73221 MRI JOINT UPR EXTREM W/O DYE: CPT

## 2024-01-03 PROCEDURE — G1004 CDSM NDSC: HCPCS

## 2024-01-03 NOTE — TELEPHONE ENCOUNTER
Caller: patrick Calderon    Doctor: Marlo    Reason for call: pt calling to reschedule injection appt. Please Advise    Call back#: [phone]

## 2024-01-08 ENCOUNTER — TELEPHONE (OUTPATIENT)
Dept: PAIN MEDICINE | Facility: CLINIC | Age: 44
End: 2024-01-08

## 2024-01-08 ENCOUNTER — PROCEDURE VISIT (OUTPATIENT)
Dept: PAIN MEDICINE | Facility: CLINIC | Age: 44
End: 2024-01-08
Payer: COMMERCIAL

## 2024-01-08 VITALS — HEART RATE: 78 BPM | SYSTOLIC BLOOD PRESSURE: 116 MMHG | DIASTOLIC BLOOD PRESSURE: 78 MMHG

## 2024-01-08 DIAGNOSIS — M79.18 CERVICAL MYOFASCIAL PAIN SYNDROME: ICD-10-CM

## 2024-01-08 DIAGNOSIS — M54.12 CERVICAL RADICULOPATHY: Primary | ICD-10-CM

## 2024-01-08 PROCEDURE — 20552 NJX 1/MLT TRIGGER POINT 1/2: CPT | Performed by: STUDENT IN AN ORGANIZED HEALTH CARE EDUCATION/TRAINING PROGRAM

## 2024-01-08 RX ORDER — LIDOCAINE HYDROCHLORIDE 10 MG/ML
10 INJECTION, SOLUTION INFILTRATION; PERINEURAL ONCE
Status: COMPLETED | OUTPATIENT
Start: 2024-01-08 | End: 2024-01-08

## 2024-01-08 RX ORDER — BUPIVACAINE HYDROCHLORIDE 2.5 MG/ML
10 INJECTION, SOLUTION EPIDURAL; INFILTRATION; INTRACAUDAL ONCE
Status: CANCELLED | OUTPATIENT
Start: 2024-01-08

## 2024-01-08 RX ORDER — METHYLPREDNISOLONE ACETATE 40 MG/ML
40 INJECTION, SUSPENSION INTRA-ARTICULAR; INTRALESIONAL; INTRAMUSCULAR; SOFT TISSUE ONCE
Status: CANCELLED | OUTPATIENT
Start: 2024-01-08 | End: 2024-01-08

## 2024-01-08 RX ORDER — LIDOCAINE HYDROCHLORIDE 10 MG/ML
5 INJECTION, SOLUTION INFILTRATION; PERINEURAL ONCE
Status: DISCONTINUED | OUTPATIENT
Start: 2024-01-08 | End: 2024-01-08

## 2024-01-08 RX ADMIN — LIDOCAINE HYDROCHLORIDE 10 ML: 10 INJECTION, SOLUTION INFILTRATION; PERINEURAL at 15:45

## 2024-01-08 RX ADMIN — LIDOCAINE HYDROCHLORIDE 10 ML: 10 INJECTION, SOLUTION INFILTRATION; PERINEURAL at 16:09

## 2024-01-08 NOTE — PROGRESS NOTES
Universal Protocol:  Consent: Verbal consent obtained.  Risks and benefits: risks, benefits and alternatives were discussed  Consent given by: patient  Patient understanding: patient states understanding of the procedure being performed  Patient consent: the patient's understanding of the procedure matches consent given  Procedure consent: procedure consent matches procedure scheduled  Relevant documents: relevant documents present and verified  Test results: test results not available  Site marked: the operative site was not marked  Radiology Images displayed and confirmed. If images not available, report reviewed: imaging studies available  Patient identity confirmed: verbally with patient  Supporting Documentation  Indications: pain   Trigger Point Injections: single/multiple trigger point(s): 1-2 muscle groups    Injection site identified by: palpation  Procedure Details  Location(s):    Neck/Upper Back: R upper trapezius, R cervical paraspinals, R levator scapulae, L cervical paraspinals, L upper trapezius and L levator scapulae     Prep: patient was prepped and draped in usual sterile fashion  Needle size: 25 G  Medications: 10 mL lidocaine (XYLOCAINE) injection 1 %

## 2024-01-08 NOTE — TELEPHONE ENCOUNTER
----- Message from Marcos Sellers MD sent at 1/8/2024  9:41 AM EST -----  Mild osteoarthritis, bursitis and tendinosis. Will discuss further at visit today.

## 2024-01-11 ENCOUNTER — TELEPHONE (OUTPATIENT)
Dept: PHYSICAL THERAPY | Facility: CLINIC | Age: 44
End: 2024-01-11

## 2024-01-11 NOTE — TELEPHONE ENCOUNTER
LVM to discuss plan moving forward; discharge or schedule more appts. Pt has no future appts scheduled.

## 2024-01-15 ENCOUNTER — TELEPHONE (OUTPATIENT)
Dept: PAIN MEDICINE | Facility: CLINIC | Age: 44
End: 2024-01-15

## 2024-01-15 NOTE — TELEPHONE ENCOUNTER
Caller: Kvng     Doctor: Marlo    Reason for call: Pt needs to speak with a nurse.     Call back#: [phone]

## 2024-01-15 NOTE — TELEPHONE ENCOUNTER
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 Pt states that she is still having pain in her right neck and arm and was told that she cannot work if she cannot lift more than 50#.  Pt has OVS with SJ on 1/16 and would like to discuss further at that time.

## 2024-01-16 NOTE — TELEPHONE ENCOUNTER
Marcos Sellers MD  [facility] (10:13 AM)     SJ  Thank you for the update. I think patient will get most benefit from GEORGE but she is afraid of needles, will discuss options at virtual visit today.  Best,  Marcos Sellers

## 2024-02-19 ENCOUNTER — OFFICE VISIT (OUTPATIENT)
Dept: URGENT CARE | Facility: CLINIC | Age: 44
End: 2024-02-19
Payer: COMMERCIAL

## 2024-02-19 VITALS
HEIGHT: 64 IN | OXYGEN SATURATION: 99 % | RESPIRATION RATE: 14 BRPM | SYSTOLIC BLOOD PRESSURE: 110 MMHG | HEART RATE: 86 BPM | WEIGHT: 144.4 LBS | DIASTOLIC BLOOD PRESSURE: 80 MMHG | BODY MASS INDEX: 24.65 KG/M2 | TEMPERATURE: 96.4 F

## 2024-02-19 DIAGNOSIS — J20.9 ACUTE BRONCHITIS, UNSPECIFIED ORGANISM: Primary | ICD-10-CM

## 2024-02-19 PROCEDURE — 87636 SARSCOV2 & INF A&B AMP PRB: CPT | Performed by: PHYSICIAN ASSISTANT

## 2024-02-19 PROCEDURE — 99203 OFFICE O/P NEW LOW 30 MIN: CPT | Performed by: PHYSICIAN ASSISTANT

## 2024-02-19 RX ORDER — PREDNISONE 50 MG/1
50 TABLET ORAL ONCE
Qty: 1 TABLET | Refills: 0 | Status: SHIPPED | OUTPATIENT
Start: 2024-02-19 | End: 2024-02-19

## 2024-02-19 RX ORDER — AZITHROMYCIN 250 MG/1
TABLET, FILM COATED ORAL
Qty: 6 TABLET | Refills: 0 | Status: SHIPPED | OUTPATIENT
Start: 2024-02-19 | End: 2024-02-23

## 2024-02-19 NOTE — PROGRESS NOTES
[facility]        NAME: Kvng Quevedo is a 43 y.o. female  : 1980    MRN: [id number]  DATE: 2024  TIME: 9:54 AM    Assessment and Plan   Acute bronchitis, unspecified organism [J20.9]  1. Acute bronchitis, unspecified organism  azithromycin (ZITHROMAX) 250 mg tablet    predniSONE 50 mg tablet            Patient Instructions     Continue to monitor symptoms.  Drink plenty of fluids.  Use over the counter Tylenol or Ibuprofen for fever and pain relief.  If new or worsening symptoms develop, go immediately to the Er.  Follow up with family doctor this week.      Chief Complaint     Chief Complaint   Patient presents with    Sore Throat     Pt reports sore throat, hurts to swallow, headache, diarrhea, body aches and cough, 3x days         History of Present Illness       Sore Throat   Associated symptoms include coughing and diarrhea. Pertinent negatives include no abdominal pain, congestion, ear pain, headaches, neck pain, shortness of breath or vomiting.       Review of Systems   Review of Systems   Constitutional:  Positive for fatigue. Negative for chills, diaphoresis and fever.   HENT:  Positive for postnasal drip, sinus pressure, sinus pain, sneezing and sore throat. Negative for congestion, ear pain, rhinorrhea and voice change.    Eyes: Negative.    Respiratory:  Positive for cough. Negative for chest tightness, shortness of breath and wheezing.    Cardiovascular:  Negative for chest pain and palpitations.   Gastrointestinal:  Positive for diarrhea. Negative for abdominal pain, constipation, nausea and vomiting.   Endocrine: Negative.    Genitourinary:  Negative for dysuria.   Musculoskeletal:  Positive for myalgias. Negative for back pain and neck pain.   Skin:  Negative for pallor and rash.   Allergic/Immunologic: Negative.    Neurological:  Negative for dizziness, syncope and headaches.   Hematological: Negative.    Psychiatric/Behavioral: Negative.           Current  "Medications       Current Outpatient Medications:     azithromycin (ZITHROMAX) 250 mg tablet, Take 2 tablets today then 1 tablet daily x 4 days, Disp: 6 tablet, Rfl: 0    predniSONE 50 mg tablet, Take 1 tablet (50 mg total) by mouth 1 (one) time for 1 dose, Disp: 1 tablet, Rfl: 0    gabapentin (NEURONTIN) 400 mg capsule, TAKE 1 CAPSULE (400 MG TOTAL) BY MOUTH DAILY AT BEDTIME, Disp: 30 capsule, Rfl: 0    meloxicam (MOBIC) 15 mg tablet, Take 1 tablet (15 mg total) by mouth daily, Disp: 30 tablet, Rfl: 1    methocarbamol (ROBAXIN) 750 mg tablet, Take 1 tablet (750 mg total) by mouth every 6 (six) hours as needed for muscle spasms, Disp: 120 tablet, Rfl: 1    tiZANidine (ZANAFLEX) 4 mg tablet, Take 4 mg by mouth every 8 (eight) hours as needed, Disp: , Rfl:     Current Allergies     Allergies as of 02/19/2024    (No Known Allergies)            The following portions of the patient's history were reviewed and updated as appropriate: allergies, current medications, past family history, past medical history, past social history, past surgical history and problem list.     History reviewed. No pertinent past medical history.    History reviewed. No pertinent surgical history.    Family History   Problem Relation Age of Onset    Diabetes Mother          Medications have been verified.        Objective   /80   Pulse 86   Temp (!) 96.4 °F (35.8 °C)   Resp 14   Ht 5' 4\" (1.626 m)   Wt 65.5 kg (144 lb 6.4 oz)   LMP 02/15/2024   SpO2 99%   BMI 24.79 kg/m²        Physical Exam     Physical Exam  Vitals and nursing note reviewed.   Constitutional:       General: She is not in acute distress.     Appearance: Normal appearance. She is well-developed. She is not ill-appearing or diaphoretic.   HENT:      Head: Normocephalic and atraumatic.      Right Ear: Tympanic membrane, ear canal and external ear normal.      Left Ear: Tympanic membrane, ear canal and external ear normal.      Nose: Congestion present. No " rhinorrhea.      Mouth/Throat:      Pharynx: Posterior oropharyngeal erythema present. No oropharyngeal exudate.      Comments: Hoarse voice, barely audible  Eyes:      General:         Right eye: No discharge.         Left eye: No discharge.      Conjunctiva/sclera: Conjunctivae normal.   Cardiovascular:      Rate and Rhythm: Normal rate and regular rhythm.      Heart sounds: Normal heart sounds.   Pulmonary:      Effort: Pulmonary effort is normal. No respiratory distress.      Breath sounds: Rhonchi (left upper) present. No wheezing or rales.   Musculoskeletal:      Cervical back: Normal range of motion and neck supple.   Lymphadenopathy:      Cervical: No cervical adenopathy.   Skin:     General: Skin is warm.      Capillary Refill: Capillary refill takes less than 2 seconds.      Coloration: Skin is not pale.      Findings: No rash.   Neurological:      Mental Status: She is alert.

## 2024-02-19 NOTE — PATIENT INSTRUCTIONS
Continue to monitor symptoms.  Drink plenty of fluids.  Use over the counter Tylenol or Ibuprofen for fever and pain relief.  If new or worsening symptoms develop, go immediately to the Er.  Follow up with family doctor this week.    Acute Bronchitis   WHAT YOU NEED TO KNOW:   Acute bronchitis is swelling and irritation in your lungs. It is usually caused by a virus and most often happens in the winter. Bronchitis may also be caused by bacteria or by a chemical irritant, such as smoke.  DISCHARGE INSTRUCTIONS:   Return to the emergency department if:   You cough up blood.    Your lips or fingernails turn blue.    You feel like you are not getting enough air when you breathe.    Call your doctor if:   Your symptoms do not go away or get worse, even after treatment.    Your cough does not get better within 4 weeks.    You have questions or concerns about your condition or care.    Medicines:  You may need any of the following:  Cough suppressants  decrease your urge to cough.    Decongestants  help loosen mucus in your lungs and make it easier to cough up. This can help you breathe easier.    Inhalers  may be given. Your healthcare provider may give you one or more inhalers to help you breathe easier and cough less. An inhaler gives you medicine to open your airways. Ask your healthcare provider to show you how to use your inhaler correctly.         Antiviral medicine  treats infections caused by a virus.    Antibiotics  may be given if your bronchitis is caused by bacteria or if you have lung condition.    Acetaminophen  decreases pain and fever. It is available without a doctor's order. Ask how much to take and how often to take it. Follow directions. Read the labels of all other medicines you are using to see if they also contain acetaminophen, or ask your doctor or pharmacist. Acetaminophen can cause liver damage if not taken correctly.    NSAIDs  help decrease swelling and pain or fever. This medicine is  available with or without a doctor's order. NSAIDs can cause stomach bleeding or kidney problems in certain people. If you take blood thinner medicine, always ask your healthcare provider if NSAIDs are safe for you. Always read the medicine label and follow directions.    Self-care:   Drink liquids as directed.  You may need to drink more liquids than usual to stay hydrated. Ask how much liquid to drink each day and which liquids are best for you.    Use a cool mist humidifier.  This increases air moisture in your home. This may make it easier for you to breathe and help decrease your cough.     Get more rest.  Rest helps your body to heal. Slowly start to do more each day. Rest when you feel it is needed.    Prevent acute bronchitis:       Ask about vaccines you may need.  Get a flu vaccine each year as soon as recommended, usually in September or October. Ask your healthcare provider if you should also get a pneumonia or COVID-19 vaccine. Your healthcare provider can tell you if you should also get other vaccines, and when to get them.    Prevent the spread of germs.  You can decrease your risk for acute bronchitis and other illnesses by doing the following:     Wash your hands often with soap and water. Carry germ-killing hand lotion or gel with you. You can use the lotion or gel to clean your hands when soap and water are not available.         Do not touch your eyes, nose, or mouth unless you have washed your hands first.    Always cover your mouth when you cough to prevent the spread of germs. It is best to cough into a tissue or your shirt sleeve instead of into your hand. Ask those around you to cover their mouths when they cough.    Try to avoid people who have a cold or the flu. If you are sick, stay away from others as much as possible.    Avoid irritants in the air.  Avoid chemicals, fumes, and dust. Wear a face mask if you must work around dust or fumes. Stay inside on days when air pollution levels are  high. If you have allergies, stay inside when pollen counts are high. Do not use aerosol products, such as spray-on deodorant, bug spray, and hair spray.    Do not smoke or be around others who are smoking.  Nicotine and other chemicals in cigarettes and cigars can cause lung damage. Ask your healthcare provider for information if you currently smoke and need help to quit. E-cigarettes or smokeless tobacco still contain nicotine. Talk to your healthcare provider before you use these products.  Follow up with your doctor as directed:  Write down questions you have so you will remember to ask them during your follow-up visits.  © Copyright [employer] 2023 Information is for End User's use only and may not be sold, redistributed or otherwise used for commercial purposes.  The above information is an  only. It is not intended as medical advice for individual conditions or treatments. Talk to your doctor, nurse or pharmacist before following any medical regimen to see if it is safe and effective for you.

## 2024-02-19 NOTE — LETTER
[facility] [address] [address]  Dept: [phone]    February 19, 2024    Patient: Kvng Quevedo  YOB: 1980    Kvng Quevedo was seen and evaluated at our [facility]. Please note if Covid and Flu tests are negative, they may return to work when fever free for 24 hours without the use of a fever reducing agent. If Covid or Flu test is positive, they may return to work on 2/21/20224, as this is 5 days from the onset of symptoms. Upon return, they must then adhere to strict masking for an additional 5 days.    Sincerely,    Jayme Thacker PA-C

## 2024-02-20 LAB
FLUAV RNA RESP QL NAA+PROBE: NEGATIVE
FLUBV RNA RESP QL NAA+PROBE: NEGATIVE
SARS-COV-2 RNA RESP QL NAA+PROBE: NEGATIVE

## 2024-03-14 ENCOUNTER — APPOINTMENT (OUTPATIENT)
Dept: LAB | Facility: CLINIC | Age: 44
End: 2024-03-14
Payer: COMMERCIAL

## 2024-03-14 ENCOUNTER — TELEPHONE (OUTPATIENT)
Dept: FAMILY MEDICINE CLINIC | Facility: CLINIC | Age: 44
End: 2024-03-14

## 2024-03-14 DIAGNOSIS — Z00.00 ANNUAL PHYSICAL EXAM: ICD-10-CM

## 2024-03-14 DIAGNOSIS — Z13.1 SCREENING FOR DIABETES MELLITUS (DM): ICD-10-CM

## 2024-03-14 DIAGNOSIS — E78.1 HYPERTRIGLYCERIDEMIA: ICD-10-CM

## 2024-03-14 DIAGNOSIS — E61.1 IRON DEFICIENCY: Primary | ICD-10-CM

## 2024-03-14 DIAGNOSIS — E78.1 PURE HYPERGLYCERIDEMIA: ICD-10-CM

## 2024-03-14 DIAGNOSIS — R79.89 ELEVATED LFTS: ICD-10-CM

## 2024-03-14 DIAGNOSIS — E55.9 VITAMIN D DEFICIENCY: ICD-10-CM

## 2024-03-14 DIAGNOSIS — Z00.00 ROUTINE GENERAL MEDICAL EXAMINATION AT A HEALTH CARE FACILITY: ICD-10-CM

## 2024-03-14 LAB
25(OH)D3 SERPL-MCNC: 12.9 NG/ML (ref 30–100)
ALBUMIN SERPL BCP-MCNC: 4.5 G/DL (ref 3.5–5)
ALP SERPL-CCNC: 34 U/L (ref 34–104)
ALT SERPL W P-5'-P-CCNC: 18 U/L (ref 7–52)
ANION GAP SERPL CALCULATED.3IONS-SCNC: 7 MMOL/L (ref 4–13)
AST SERPL W P-5'-P-CCNC: 18 U/L (ref 13–39)
BASOPHILS # BLD AUTO: 0.07 THOUSANDS/ÂΜL (ref 0–0.1)
BASOPHILS NFR BLD AUTO: 1 % (ref 0–1)
BILIRUB SERPL-MCNC: 0.32 MG/DL (ref 0.2–1)
BUN SERPL-MCNC: 12 MG/DL (ref 5–25)
CALCIUM SERPL-MCNC: 9 MG/DL (ref 8.4–10.2)
CHLORIDE SERPL-SCNC: 104 MMOL/L (ref 96–108)
CHOLEST SERPL-MCNC: 157 MG/DL
CO2 SERPL-SCNC: 28 MMOL/L (ref 21–32)
CREAT SERPL-MCNC: 0.6 MG/DL (ref 0.6–1.3)
EOSINOPHIL # BLD AUTO: 0.12 THOUSAND/ÂΜL (ref 0–0.61)
EOSINOPHIL NFR BLD AUTO: 2 % (ref 0–6)
ERYTHROCYTE [DISTWIDTH] IN BLOOD BY AUTOMATED COUNT: 13.2 % (ref 11.6–15.1)
EST. AVERAGE GLUCOSE BLD GHB EST-MCNC: 97 MG/DL
GFR SERPL CREATININE-BSD FRML MDRD: 113 ML/MIN/1.73SQ M
GLUCOSE P FAST SERPL-MCNC: 112 MG/DL (ref 65–99)
HBA1C MFR BLD: 5 %
HCT VFR BLD AUTO: 41.3 % (ref 34.8–46.1)
HDLC SERPL-MCNC: 41 MG/DL
HGB BLD-MCNC: 13.6 G/DL (ref 11.5–15.4)
IMM GRANULOCYTES # BLD AUTO: 0.02 THOUSAND/UL (ref 0–0.2)
IMM GRANULOCYTES NFR BLD AUTO: 0 % (ref 0–2)
IRON SERPL-MCNC: 33 UG/DL (ref 50–212)
LDLC SERPL CALC-MCNC: 100 MG/DL (ref 0–100)
LYMPHOCYTES # BLD AUTO: 1.64 THOUSANDS/ÂΜL (ref 0.6–4.47)
LYMPHOCYTES NFR BLD AUTO: 30 % (ref 14–44)
MCH RBC QN AUTO: 30.3 PG (ref 26.8–34.3)
MCHC RBC AUTO-ENTMCNC: 32.9 G/DL (ref 31.4–37.4)
MCV RBC AUTO: 92 FL (ref 82–98)
MONOCYTES # BLD AUTO: 0.41 THOUSAND/ÂΜL (ref 0.17–1.22)
MONOCYTES NFR BLD AUTO: 8 % (ref 4–12)
NEUTROPHILS # BLD AUTO: 3.15 THOUSANDS/ÂΜL (ref 1.85–7.62)
NEUTS SEG NFR BLD AUTO: 59 % (ref 43–75)
NONHDLC SERPL-MCNC: 116 MG/DL
NRBC BLD AUTO-RTO: 0 /100 WBCS
PLATELET # BLD AUTO: 300 THOUSANDS/UL (ref 149–390)
PMV BLD AUTO: 9.7 FL (ref 8.9–12.7)
POTASSIUM SERPL-SCNC: 4.1 MMOL/L (ref 3.5–5.3)
PROT SERPL-MCNC: 7.3 G/DL (ref 6.4–8.4)
RBC # BLD AUTO: 4.49 MILLION/UL (ref 3.81–5.12)
SODIUM SERPL-SCNC: 139 MMOL/L (ref 135–147)
TRIGL SERPL-MCNC: 78 MG/DL
WBC # BLD AUTO: 5.41 THOUSAND/UL (ref 4.31–10.16)

## 2024-03-14 PROCEDURE — 83540 ASSAY OF IRON: CPT

## 2024-03-14 PROCEDURE — 36415 COLL VENOUS BLD VENIPUNCTURE: CPT

## 2024-03-14 PROCEDURE — 83036 HEMOGLOBIN GLYCOSYLATED A1C: CPT

## 2024-03-14 PROCEDURE — 82306 VITAMIN D 25 HYDROXY: CPT

## 2024-03-14 PROCEDURE — 80061 LIPID PANEL: CPT

## 2024-03-14 PROCEDURE — 85025 COMPLETE CBC W/AUTO DIFF WBC: CPT

## 2024-03-14 PROCEDURE — 80053 COMPREHEN METABOLIC PANEL: CPT

## 2024-03-14 NOTE — TELEPHONE ENCOUNTER
Patient was advised that she can't have a physical without new labs being ordered.  Physical scheduled for next Friday 3/22.  Needs new labs ordered.  She plans on getting them done tomorrow morning.

## 2024-03-22 ENCOUNTER — OFFICE VISIT (OUTPATIENT)
Dept: FAMILY MEDICINE CLINIC | Facility: CLINIC | Age: 44
End: 2024-03-22
Payer: COMMERCIAL

## 2024-03-22 VITALS
DIASTOLIC BLOOD PRESSURE: 80 MMHG | SYSTOLIC BLOOD PRESSURE: 112 MMHG | BODY MASS INDEX: 23.22 KG/M2 | HEIGHT: 64 IN | HEART RATE: 96 BPM | WEIGHT: 136 LBS

## 2024-03-22 DIAGNOSIS — E61.1 IRON DEFICIENCY: ICD-10-CM

## 2024-03-22 DIAGNOSIS — Z12.31 ENCOUNTER FOR SCREENING MAMMOGRAM FOR MALIGNANT NEOPLASM OF BREAST: ICD-10-CM

## 2024-03-22 DIAGNOSIS — Z00.00 ANNUAL PHYSICAL EXAM: Primary | ICD-10-CM

## 2024-03-22 PROCEDURE — 99396 PREV VISIT EST AGE 40-64: CPT | Performed by: NURSE PRACTITIONER

## 2024-03-22 RX ORDER — FERROUS SULFATE 324(65)MG
324 TABLET, DELAYED RELEASE (ENTERIC COATED) ORAL
Qty: 90 TABLET | Refills: 3 | Status: SHIPPED | OUTPATIENT
Start: 2024-03-22

## 2024-03-22 NOTE — PATIENT INSTRUCTIONS
Heart healthy, carbohydrate controlled diet- limit red meat, limit saturated fat, moderate salt intake, limit junk food, etc.   Regular exercise  Stress management  Routine labwork and screenings as ordered.   Recommend over the counter vitamin D3 supplement, 2000 units daily.   Start ferrous sulfate 324mg daily with breakfast  Mammogram to be scheduled.

## 2024-03-22 NOTE — PROGRESS NOTES
[facility] HEALTH MAINTENANCE OFFICE VISIT  [facility] - [facility] PRACT[facility]    NAME: Kvng Quevedo  AGE: 43 y.o. SEX: female  : 1980     DATE: 3/22/2024    Assessment and Plan   1. Annual physical exam  Heart healthy, carbohydrate controlled diet- limit red meat, limit saturated fat, moderate salt intake, limit junk food, etc.   Regular exercise  Stress management  Routine labwork and screenings as ordered.   Recommend over the counter vitamin D3 supplement, 2000 units daily.   Start ferrous sulfate 324mg daily with breakfast  Mammogram to be scheduled.     2. Encounter for screening mammogram for malignant neoplasm of breast  - Mammo screening bilateral w 3d & cad; Future    3. Iron deficiency  - ferrous sulfate 324 (65 Fe) mg; Take 1 tablet (324 mg total) by mouth daily before breakfast  Dispense: 90 tablet; Refill: 3        Patient Counseling:   Nutrition: Stressed importance of a well balanced diet, moderation of sodium/saturated fat, caloric balance and sufficient intake of fiber  Exercise: Stressed the importance of regular exercise with a goal of 150 minutes per week  Dental Health: Discussed daily flossing and brushing and regular dental visits     Immunizations reviewed: Risks and Benefits discussed  Discussed benefits of:  Mammogram , Cervical Cancer screening, and Screening labs.  BMI Counseling: Body mass index is 23.34 kg/m². Discussed with patient's BMI with her. BMI is wnl. Counseled on well balanced diet and regular exericse.               Chief Complaint     Chief Complaint   Patient presents with    Physical Exam     HK CMA        History of Present Illness     Here for annual exam and lab review  Pt with ongoing muscular chest and back pain. All testing was negative to date. Going to PT.   Does report feeling tired. Has iron deficiency and does not take any supplements.   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   Reports had work  "injury in January and saw occ med. Has \"physical limitations\" per \"work doctor\".  Reports injury to right shoulder. Advised that I cannot comment on work related injury and limitations and this needs to be handled through workers comp/occ med.             Well Adult Physical   Patient here for a comprehensive physical exam.      Diet and Physical Activity  Diet: well balanced diet  Exercise:  physically active              General Health  Hearing: Normal:  bilateral  Vision: most recent eye exam >1 year and wears glasses  Dental: regular dental visits    Reproductive Health  Overdue for gyn care. Info given for local gyn      Depression Screening Follow-up Plan: Patient's depression screening was negative with a PHQ-2 score of 0. Patient assessed for underlying major depression. They have no active suicidal ideations. Brief counseling provided and recommend additional follow-up/re-evaluation next office visit.    The following portions of the patient's history were reviewed and updated as appropriate: allergies, current medications, past family history, past medical history, past social history, past surgical history and problem list.    Review of Systems     Review of Systems   Constitutional:  Positive for fatigue. Negative for chills, diaphoresis and fever.   HENT:  Negative for congestion, ear pain, sinus pressure, sinus pain and sore throat.    Eyes:  Negative for visual disturbance.   Respiratory:  Negative for cough, chest tightness, shortness of breath and wheezing.    Cardiovascular:  Negative for chest pain, palpitations and leg swelling.   Gastrointestinal:  Negative for abdominal distention, abdominal pain, diarrhea and nausea.   Endocrine: Negative for polydipsia.   Genitourinary:  Negative for dysuria, frequency and urgency.   Musculoskeletal:  Positive for arthralgias (report right shoulder pain from work injury in January), back pain and myalgias.   Skin:  Negative for rash and wound. "   Allergic/Immunologic: Negative for immunocompromised state.   Neurological:  Negative for dizziness, weakness and headaches.   Hematological:  Negative for adenopathy.   Psychiatric/Behavioral:  Negative for dysphoric mood. The patient is not nervous/anxious.    All other systems reviewed and are negative.      Past Medical History     History reviewed. No pertinent past medical history.    Past Surgical History     History reviewed. No pertinent surgical history.    Social History     Social History     Socioeconomic History    Marital status:      Spouse name: None    Number of children: None    Years of education: None    Highest education level: None   Occupational History    None   Tobacco Use    Smoking status: Never     Passive exposure: Never    Smokeless tobacco: Never   Vaping Use    Vaping status: Never Used   Substance and Sexual Activity    Alcohol use: Never    Drug use: Never    Sexual activity: None   Other Topics Concern    None   Social History Narrative    None     Social Determinants of Health     Financial Resource Strain: Not on file   Food Insecurity: Not on file   Transportation Needs: Not on file   Physical Activity: Not on file   Stress: Not on file   Social Connections: Not on file   Intimate Partner Violence: Not on file   Housing Stability: Not on file       Family History     Family History   Problem Relation Age of Onset    Diabetes Mother        Current Medications       Current Outpatient Medications:     tiZANidine (ZANAFLEX) 4 mg tablet, Take 4 mg by mouth every 8 (eight) hours as needed, Disp: , Rfl:     gabapentin (NEURONTIN) 400 mg capsule, TAKE 1 CAPSULE (400 MG TOTAL) BY MOUTH DAILY AT BEDTIME, Disp: 30 capsule, Rfl: 0    meloxicam (MOBIC) 15 mg tablet, Take 1 tablet (15 mg total) by mouth daily, Disp: 30 tablet, Rfl: 1    methocarbamol (ROBAXIN) 750 mg tablet, Take 1 tablet (750 mg total) by mouth every 6 (six) hours as needed for muscle spasms, Disp: 120 tablet,  Rfl: 1     Allergies     No Known Allergies    Objective     Recent Results (from the past 672 hour(s))   Iron    Collection Time: 03/14/24  8:54 AM   Result Value Ref Range    Iron 33 (L) 50 - 212 ug/dL   CBC and differential    Collection Time: 03/14/24  8:54 AM   Result Value Ref Range    WBC 5.41 4.31 - 10.16 Thousand/uL    RBC 4.49 3.81 - 5.12 Million/uL    Hemoglobin 13.6 11.5 - 15.4 g/dL    Hematocrit 41.3 34.8 - 46.1 %    MCV 92 82 - 98 fL    MCH 30.3 26.8 - 34.3 pg    MCHC 32.9 31.4 - 37.4 g/dL    RDW 13.2 11.6 - 15.1 %    MPV 9.7 8.9 - 12.7 fL    Platelets 300 149 - 390 Thousands/uL    nRBC 0 /100 WBCs    Neutrophils Relative 59 43 - 75 %    Immature Grans % 0 0 - 2 %    Lymphocytes Relative 30 14 - 44 %    Monocytes Relative 8 4 - 12 %    Eosinophils Relative 2 0 - 6 %    Basophils Relative 1 0 - 1 %    Neutrophils Absolute 3.15 1.85 - 7.62 Thousands/µL    Absolute Immature Grans 0.02 0.00 - 0.20 Thousand/uL    Absolute Lymphocytes 1.64 0.60 - 4.47 Thousands/µL    Absolute Monocytes 0.41 0.17 - 1.22 Thousand/µL    Eosinophils Absolute 0.12 0.00 - 0.61 Thousand/µL    Basophils Absolute 0.07 0.00 - 0.10 Thousands/µL   Comprehensive metabolic panel    Collection Time: 03/14/24  8:54 AM   Result Value Ref Range    Sodium 139 135 - 147 mmol/L    Potassium 4.1 3.5 - 5.3 mmol/L    Chloride 104 96 - 108 mmol/L    CO2 28 21 - 32 mmol/L    ANION GAP 7 4 - 13 mmol/L    BUN 12 5 - 25 mg/dL    Creatinine 0.60 0.60 - 1.30 mg/dL    Glucose, Fasting 112 (H) 65 - 99 mg/dL    Calcium 9.0 8.4 - 10.2 mg/dL    AST 18 13 - 39 U/L    ALT 18 7 - 52 U/L    Alkaline Phosphatase 34 34 - 104 U/L    Total Protein 7.3 6.4 - 8.4 g/dL    Albumin 4.5 3.5 - 5.0 g/dL    Total Bilirubin 0.32 0.20 - 1.00 mg/dL    eGFR 113 ml/min/1.73sq m   Lipid panel    Collection Time: 03/14/24  8:54 AM   Result Value Ref Range    Cholesterol 157 See Comment mg/dL    Triglycerides 78 See Comment mg/dL    HDL, Direct 41 (L) >=50 mg/dL    LDL Calculated 100  "0 - 100 mg/dL    Non-HDL-Chol (CHOL-HDL) 116 mg/dl   Hemoglobin A1C    Collection Time: 03/14/24  8:54 AM   Result Value Ref Range    Hemoglobin A1C 5.0 Normal 4.0-5.6%; PreDiabetic 5.7-6.4%; Diabetic >=6.5%; Glycemic control for adults with diabetes <7.0% %    EAG 97 mg/dl   Vitamin D 25 hydroxy    Collection Time: 03/14/24  8:54 AM   Result Value Ref Range    Vit D, 25-Hydroxy 12.9 (L) 30.0 - 100.0 ng/mL     Reviewed lab/diagnostic results with pt including both normal and abnormal findings.   In depth counseling and instructions given. All questions answered during visit.     /80   Pulse 96   Ht 5' 4\" (1.626 m)   Wt 61.7 kg (136 lb)   LMP 03/06/2024 (Exact Date)   BMI 23.34 kg/m²      Physical Exam  Vitals reviewed.   Constitutional:       General: She is not in acute distress.     Appearance: Normal appearance. She is well-developed. She is not ill-appearing.   HENT:      Head: Normocephalic and atraumatic.      Right Ear: Tympanic membrane and ear canal normal.      Left Ear: Tympanic membrane and ear canal normal.      Nose: Nose normal.      Mouth/Throat:      Mouth: Mucous membranes are moist.      Pharynx: Oropharynx is clear.   Eyes:      General: No scleral icterus.     Extraocular Movements: Extraocular movements intact.      Pupils: Pupils are equal, round, and reactive to light.   Neck:      Thyroid: No thyromegaly.      Vascular: No carotid bruit.   Cardiovascular:      Rate and Rhythm: Normal rate and regular rhythm.      Heart sounds: Normal heart sounds. No murmur heard.  Pulmonary:      Effort: Pulmonary effort is normal. No respiratory distress.      Breath sounds: Normal breath sounds. No wheezing or rales.   Abdominal:      General: Bowel sounds are normal. There is no distension.      Palpations: Abdomen is soft.      Tenderness: There is no abdominal tenderness.   Musculoskeletal:         General: No deformity or signs of injury. Normal range of motion.      Cervical back: Normal " range of motion and neck supple.      Right lower leg: No edema.      Left lower leg: No edema.   Lymphadenopathy:      Cervical: No cervical adenopathy.   Skin:     General: Skin is warm and dry.      Coloration: Skin is not jaundiced or pale.   Neurological:      General: No focal deficit present.      Mental Status: She is alert and oriented to person, place, and time.      Cranial Nerves: No cranial nerve deficit.      Sensory: No sensory deficit.   Psychiatric:         Mood and Affect: Mood normal.         Behavior: Behavior normal.         Thought Content: Thought content normal.         Judgment: Judgment normal.           Vision Screening    Right eye Left eye Both eyes   Without correction      With correction 20/20 20/30 20/30           JOSE Matthew  [facility]

## 2024-03-26 ENCOUNTER — CLINICAL SUPPORT (OUTPATIENT)
Dept: FAMILY MEDICINE CLINIC | Facility: CLINIC | Age: 44
End: 2024-03-26
Payer: COMMERCIAL

## 2024-03-26 DIAGNOSIS — Z11.1 ENCOUNTER FOR PPD TEST: Primary | ICD-10-CM

## 2024-03-26 PROCEDURE — 86580 TB INTRADERMAL TEST: CPT

## 2024-03-28 ENCOUNTER — CLINICAL SUPPORT (OUTPATIENT)
Dept: FAMILY MEDICINE CLINIC | Facility: CLINIC | Age: 44
End: 2024-03-28

## 2024-03-28 DIAGNOSIS — Z11.1 ENCOUNTER FOR PPD SKIN TEST READING: Primary | ICD-10-CM

## 2024-04-18 ENCOUNTER — OFFICE VISIT (OUTPATIENT)
Dept: PAIN MEDICINE | Facility: CLINIC | Age: 44
End: 2024-04-18
Payer: COMMERCIAL

## 2024-04-18 VITALS
WEIGHT: 134.4 LBS | BODY MASS INDEX: 22.94 KG/M2 | DIASTOLIC BLOOD PRESSURE: 74 MMHG | HEIGHT: 64 IN | HEART RATE: 109 BPM | SYSTOLIC BLOOD PRESSURE: 121 MMHG

## 2024-04-18 DIAGNOSIS — G89.4 CHRONIC PAIN SYNDROME: Primary | ICD-10-CM

## 2024-04-18 DIAGNOSIS — M48.02 SPINAL STENOSIS, CERVICAL REGION: ICD-10-CM

## 2024-04-18 DIAGNOSIS — M54.12 CERVICAL RADICULOPATHY: ICD-10-CM

## 2024-04-18 PROCEDURE — 99215 OFFICE O/P EST HI 40 MIN: CPT | Performed by: STUDENT IN AN ORGANIZED HEALTH CARE EDUCATION/TRAINING PROGRAM

## 2024-04-18 RX ORDER — METHOCARBAMOL 750 MG/1
750 TABLET, FILM COATED ORAL EVERY 6 HOURS PRN
Qty: 120 TABLET | Refills: 1 | Status: SHIPPED | OUTPATIENT
Start: 2024-04-18 | End: 2024-06-17

## 2024-04-18 RX ORDER — GABAPENTIN 400 MG/1
400 CAPSULE ORAL
Qty: 30 CAPSULE | Refills: 0 | Status: SHIPPED | OUTPATIENT
Start: 2024-04-18 | End: 2024-06-17

## 2024-04-18 RX ORDER — METHYLPREDNISOLONE 4 MG/1
TABLET ORAL
Qty: 1 EACH | Refills: 0 | Status: SHIPPED | OUTPATIENT
Start: 2024-04-18

## 2024-04-18 NOTE — PROGRESS NOTES
Pain Medicine Follow-Up Note    Assessment:  1. Chronic pain syndrome    2. Cervical radiculopathy    3. Spinal stenosis, cervical region      Patient presents as a follow-up visit after last being seen on 1/8/2024 where she underwent trigger point injections cervical paraspinal muscles.  In regards to medications patient has been managed on gabapentin 400 mg nightly along with Robaxin and meloxicam.  Patient symptoms are worse since her last visit rating her pain a 10 out of 10 numeric rating scale.  Pain is worse in the morning and evening and the pain is constant, occurring Hydrocyn at a time.  The quality pain is sharp, throbbing, pressure-like, shooting, numbing, pins-and-needles primarily in her neck and her mid back.    During visit patient discussing current Worker's Compensation case.  Discussed with patient that I cannot take her out of work and I do not perform functional capacity evaluation so she will have to follow-up with the team to go into work for further discussion of this.  For chronic neck pain and cervical radiculopathy which have been managed happy to continue current medication regimen which includes gabapentin for 100 mg nightly, Robaxin-750 milligrams every 6 hours as needed and will prescribe a Medrol Dosepak to help with symptomatic relief.  Additionally will provide patient with a prescription for physical therapy.    Plan:  Continue gabapentin 400 mg nightly   Continue robaxin 750 mg q6hr prn   Start medrol dose pack   Ambulatory referral to PT   Orders Placed This Encounter   Procedures   • Ambulatory referral to Physical Therapy     Standing Status:   Future     Standing Expiration Date:   4/18/2025     Referral Priority:   Routine     Referral Type:   Physical Therapy     Referral Reason:   Specialty Services Required     Requested Specialty:   Physical Therapy     Number of Visits Requested:   1     Expiration Date:   4/18/2025       New Medications Ordered This Visit   Medications    • methocarbamol (ROBAXIN) 750 mg tablet     Sig: Take 1 tablet (750 mg total) by mouth every 6 (six) hours as needed for muscle spasms     Dispense:  120 tablet     Refill:  1   • gabapentin (NEURONTIN) 400 mg capsule     Sig: Take 1 capsule (400 mg total) by mouth daily at bedtime     Dispense:  30 capsule     Refill:  0   • methylPREDNISolone 4 MG tablet therapy pack     Sig: Use as directed on package     Dispense:  1 each     Refill:  0     I have spent a total time of 41 minutes on 04/18/24 in caring for this patient including Prognosis, Risks and benefits of tx options, Instructions for management, Importance of tx compliance, Risk factor reductions, Impressions, Counseling / Coordination of care, Documenting in the medical record, and Obtaining or reviewing history  .      My impressions and treatment recommendations were discussed in detail with the patient who verbalized understanding and had no further questions.        Follow-up is planned in three months time or sooner as warranted.  Discharge instructions were provided. I personally saw and examined the patient and I agree with the above discussed plan of care.    History of Present Illness:    Kvng Quevedo is a 43 y.o. female who presents to [facility] for interval re-evaluation of the above stated pain complaints. The patient has a past medical and chronic pain history as outlined in the assessment section. She was last seen on 1/8/2024.    Patient presents as a follow-up visit after last being seen on 1/8/2024 where she underwent trigger point injections cervical paraspinal muscles.  In regards to medications patient has been managed on gabapentin 400 mg nightly along with Robaxin and meloxicam.  Patient symptoms are worse since her last visit rating her pain a 10 out of 10 numeric rating scale.  Pain is worse in the morning and evening and the pain is constant, occurring Hydrocyn at a time.  The quality pain is sharp,  "throbbing, pressure-like, shooting, numbing, pins-and-needles primarily in her neck and her mid back.      Other than as stated above, the patient denies any interval changes in medications, medical condition, mental condition, symptoms, or allergies since the last office visit.         Review of Systems:    Review of Systems   Respiratory:  Positive for shortness of breath.    Musculoskeletal:  Positive for joint swelling (stiffness), neck pain and neck stiffness.   Neurological:  Positive for dizziness and weakness.   Psychiatric/Behavioral:  Positive for decreased concentration and sleep disturbance.          History reviewed. No pertinent past medical history.    History reviewed. No pertinent surgical history.    Family History   Problem Relation Age of Onset   • Diabetes Mother        Social History     Occupational History   • Not on file   Tobacco Use   • Smoking status: Never     Passive exposure: Never   • Smokeless tobacco: Never   Vaping Use   • Vaping status: Never Used   Substance and Sexual Activity   • Alcohol use: Never   • Drug use: Never   • Sexual activity: Not on file         Current Outpatient Medications:   •  ferrous sulfate 324 (65 Fe) mg, Take 1 tablet (324 mg total) by mouth daily before breakfast, Disp: 90 tablet, Rfl: 3  •  gabapentin (NEURONTIN) 400 mg capsule, Take 1 capsule (400 mg total) by mouth daily at bedtime, Disp: 30 capsule, Rfl: 0  •  methocarbamol (ROBAXIN) 750 mg tablet, Take 1 tablet (750 mg total) by mouth every 6 (six) hours as needed for muscle spasms, Disp: 120 tablet, Rfl: 1  •  methylPREDNISolone 4 MG tablet therapy pack, Use as directed on package, Disp: 1 each, Rfl: 0  •  meloxicam (MOBIC) 15 mg tablet, Take 1 tablet (15 mg total) by mouth daily, Disp: 30 tablet, Rfl: 1    No Known Allergies    Physical Exam:    /74   Pulse (!) 109   Ht 5' 4\" (1.626 m)   Wt 61 kg (134 lb 6.4 oz)   LMP 03/06/2024 (Exact Date)   BMI 23.07 kg/m²     Constitutional:normal, " well developed, well nourished, alert, in no distress and non-toxic and no overt pain behavior.  Eyes:anicteric  HEENT:grossly intact  Neck:supple, symmetric, trachea midline and no masses   Pulmonary:even and unlabored  Cardiovascular:No edema or pitting edema present  Skin:Normal without rashes or lesions and well hydrated  Psychiatric:Mood and affect appropriate  Neurologic:Cranial Nerves II-XII grossly intact  Musculoskeletal:normal gait.       Imaging  No orders to display         Orders Placed This Encounter   Procedures   • Ambulatory referral to Physical Therapy

## 2024-05-07 ENCOUNTER — HOSPITAL ENCOUNTER (OUTPATIENT)
Dept: RADIOLOGY | Facility: HOSPITAL | Age: 44
Discharge: HOME | End: 2024-05-07
Payer: COMMERCIAL

## 2024-05-07 VITALS — BODY MASS INDEX: 24.29 KG/M2 | HEIGHT: 62 IN | WEIGHT: 132 LBS

## 2024-05-07 DIAGNOSIS — Z12.31 ENCOUNTER FOR SCREENING MAMMOGRAM FOR MALIGNANT NEOPLASM OF BREAST: ICD-10-CM

## 2024-05-07 PROCEDURE — 77063 BREAST TOMOSYNTHESIS BI: CPT

## 2024-05-07 PROCEDURE — 77067 SCR MAMMO BI INCL CAD: CPT

## 2024-05-09 ENCOUNTER — EVALUATION (OUTPATIENT)
Dept: PHYSICAL THERAPY | Facility: CLINIC | Age: 44
End: 2024-05-09
Payer: COMMERCIAL

## 2024-05-09 ENCOUNTER — TELEPHONE (OUTPATIENT)
Dept: FAMILY MEDICINE CLINIC | Facility: CLINIC | Age: 44
End: 2024-05-09

## 2024-05-09 DIAGNOSIS — M43.6 STIFFNESS OF CERVICAL SPINE: ICD-10-CM

## 2024-05-09 DIAGNOSIS — M25.69 BACK STIFFNESS: ICD-10-CM

## 2024-05-09 DIAGNOSIS — M54.12 CERVICAL RADICULOPATHY: Primary | ICD-10-CM

## 2024-05-09 PROCEDURE — 97110 THERAPEUTIC EXERCISES: CPT

## 2024-05-09 PROCEDURE — 97162 PT EVAL MOD COMPLEX 30 MIN: CPT

## 2024-05-09 NOTE — TELEPHONE ENCOUNTER
"----- Message from JOSE Matthew sent at 5/9/2024  7:30 AM EDT -----  Regarding: appt  Pt is scheduled for \"med check\" on 5/14  I do not prescribe any meds for her. She just had annual physical in March.  Unsure what appt is for and if necessary  Please call to clarify and can cancel if not needed. TY    "

## 2024-05-09 NOTE — TELEPHONE ENCOUNTER
Spoke to patient.  She wants to keep her appt on 5/14. Said she needs to see gerhard and follow up with her after she had a fall on 5/3 and she has also started PT.
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
## 2024-05-13 ENCOUNTER — TELEPHONE (OUTPATIENT)
Dept: FAMILY MEDICINE CLINIC | Facility: CLINIC | Age: 44
End: 2024-05-13

## 2024-05-13 NOTE — TELEPHONE ENCOUNTER
"----- Message from JOSE Matthew sent at 5/13/2024  6:59 AM EDT -----  Regarding: appt/fall?  Please call pt to obtain more specific information regarding \"fall\",  appt scheduled tomorrow.   In prior appt, pt had been c/o issues related to a \"fall\" which happened at work.   If this is a work related fall, she cannot be seen here for anything to do with that or any other work related injury.     "

## 2024-05-14 ENCOUNTER — OFFICE VISIT (OUTPATIENT)
Dept: FAMILY MEDICINE CLINIC | Facility: CLINIC | Age: 44
End: 2024-05-14
Payer: COMMERCIAL

## 2024-05-14 ENCOUNTER — OFFICE VISIT (OUTPATIENT)
Dept: PHYSICAL THERAPY | Facility: CLINIC | Age: 44
End: 2024-05-14
Payer: COMMERCIAL

## 2024-05-14 VITALS
HEIGHT: 62 IN | OXYGEN SATURATION: 99 % | RESPIRATION RATE: 16 BRPM | BODY MASS INDEX: 24.11 KG/M2 | HEART RATE: 110 BPM | DIASTOLIC BLOOD PRESSURE: 60 MMHG | TEMPERATURE: 98.5 F | WEIGHT: 131 LBS | SYSTOLIC BLOOD PRESSURE: 110 MMHG

## 2024-05-14 DIAGNOSIS — R06.02 SOB (SHORTNESS OF BREATH): Primary | ICD-10-CM

## 2024-05-14 DIAGNOSIS — M54.9 CHRONIC BACK PAIN, UNSPECIFIED BACK LOCATION, UNSPECIFIED BACK PAIN LATERALITY: ICD-10-CM

## 2024-05-14 DIAGNOSIS — G89.29 CHRONIC BACK PAIN, UNSPECIFIED BACK LOCATION, UNSPECIFIED BACK PAIN LATERALITY: ICD-10-CM

## 2024-05-14 DIAGNOSIS — M54.12 CERVICAL RADICULOPATHY: Primary | ICD-10-CM

## 2024-05-14 DIAGNOSIS — M25.69 BACK STIFFNESS: ICD-10-CM

## 2024-05-14 DIAGNOSIS — M54.2 CHRONIC NECK PAIN: ICD-10-CM

## 2024-05-14 DIAGNOSIS — M43.6 STIFFNESS OF CERVICAL SPINE: ICD-10-CM

## 2024-05-14 DIAGNOSIS — G89.29 CHRONIC NECK PAIN: ICD-10-CM
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
## 2024-05-14 PROCEDURE — 97112 NEUROMUSCULAR REEDUCATION: CPT

## 2024-05-14 PROCEDURE — 99214 OFFICE O/P EST MOD 30 MIN: CPT | Performed by: NURSE PRACTITIONER

## 2024-05-14 PROCEDURE — 97110 THERAPEUTIC EXERCISES: CPT

## 2024-05-14 RX ORDER — ALBUTEROL SULFATE 90 UG/1
2 AEROSOL, METERED RESPIRATORY (INHALATION) EVERY 6 HOURS PRN
Qty: 18 G | Refills: 0 | Status: SHIPPED | OUTPATIENT
Start: 2024-05-14

## 2024-05-14 NOTE — PATIENT INSTRUCTIONS
Chest xray and xray of lumbar spine.   Schedule appt with pulmonary provider as discussed  Rescue inhaler 2 puffs as needed for shortness breath, chest tightness, bronchospasm, coughing fits.   Follow up with pain management  As discussed, I will not be completing any disability paperwork and you will need to discuss with pain management or orthopedics for any functional assessments and/or disability paperwork.

## 2024-05-14 NOTE — PROGRESS NOTES
"Daily Note     Today's date: 2024  Patient name: Kvng Quevedo  : 1980  MRN: [id number]  Referring provider: Marcos Sellers MD  Dx:   Encounter Diagnosis     ICD-10-CM    1. Cervical radiculopathy  M54.12       2. Stiffness of cervical spine  M43.6       3. Back stiffness  M25.69                      Subjective: Pt states that lower back is irritating her more recently; she had knots along lower back. States that her familiar tailbone pain is more prominent recently. Pt has appointment with MD this morning; she plans to ask about inhaler.     Objective: See treatment diary below      Assessment: Pt demonstrates significant ROM limitation with seated cervical retraction; regressed to supine for greater AROM and improved tolerance to activity. Modified side-lying open books; held on cervical AROM during this exercise; also instructed pt to slide hand along thigh/ hip to open chest rather than horizontal abduction unsupported. Pt with report of stretching of chest w/ table AROM exercises; mild increase in discomfort at completion of session. Tolerated treatment well. Patient would benefit from continued PT.       Plan: Continue per plan of care.  Progress treatment as tolerated.       Precautions: History reviewed. No pertinent past medical history.  History reviewed. No pertinent surgical history.    SOC: 2024  FOTO: 2024  POC Expiration: 2024   Daily Treatment Log  Date: Initial Evaluation 2024  Next session        Visit#/ auth: 1  2         Objective Measures              Mechanical assessment             Manuals                                                                     Neuro Re-Ed    20'         Supine chin tuck   5\" x 10          Seated chin tuck   5\" x 10       Supine scap retraction   5\" x 10          supine shoulder ER AROM    1 x 15                                                                 Ther Ex  10'  20'          Cervical flex/ ext AROM    1 x 10           " Cervical rotation AROM    1 x 10 R/L         Cervical side bend AROM             Slouch OC   1 x 10          Supine flex w/ cane   1 x 10 B/L w/ cane         SL open books   1 x 10 R/L modified w/ hand on thigh/hip                       EDUCATION: Pathology, review of impairements, prognosis, activity modification, POC, and HEP  KE  HEP updated and reviewed         Ther Activity              Posture education                           Gait Training                                         Modalities                                         HEP:   Access Code: [email or web address]  URL: [email or web address]  Date: 05/14/2024  Prepared by: Yenifer Richardson    Exercises  - Supine Chin Tuck  - 2 x daily - 1 sets - 10 reps - 5 seconds hold  - Supine Scapular Retraction  - 2 x daily - 1 sets - 10 reps - 5 seconds hold  - Head Nods  - 2 x daily - 1 sets - 10 reps

## 2024-05-14 NOTE — PROGRESS NOTES
Name: Kvng Quevedo      : 1980      MRN: [id number]  Encounter Provider: JOSE Matthew  Encounter Date: 2024   Encounter department: [facility]    Assessment & Plan   1. SOB (shortness of breath)  Subjective. RR 16/min with oxygen sat of 99% and clear lungs. Will check cxr and refer to pulmonary.   - XR chest pa & lateral; Future  - albuterol (Ventolin HFA) 90 mcg/act inhaler; Inhale 2 puffs every 6 (six) hours as needed for shortness of breath  Dispense: 18 g; Refill: 0  - Ambulatory Referral to Pulmonology; Future    2. Chronic back pain, unspecified back location, unspecified back pain laterality  To be managed by Dr. Sellers (pain/spine).   Will check xray as related to recent fall.   - XR spine lumbar 2 or 3 views injury; Future    3. Chronic neck pain  To be managed by Dr. Sellers (pain/spine)    4. Fall, subsequent encounter  Incident occurred 5/3, currently with no abnormality on exam. Will check xray.   - XR spine lumbar 2 or 3 views injury; Future        In depth conversation with pt advising that I will not complete any disability paperwork, no work limitations, no physical restrictions.   She can discuss further with Dr Sellers who is managing spine issues, but I cannot speak for what he will or will not be able to do.   Advised that she must follow up with workers comp providers for work injury issues/rtw/limitations  Subjective      Here for fall that occurred on 5/3. C/O back pain and sob.   Reports wasn't feel well the day of the fall.  hit head and fell backwards. denies current dizziness, headache. Denies any dizziness at time of fall, not sure what happened.   Thinks may have passed out. Not witnessed. No recurrence and has never had similar episode.   Feels like spine pain worse since fall. Hit low back pain when fell. Did not seek any medical care until now.   Able to do daily activities, dress self, is able to drive. Drove self to appt.   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  was sent home from work in April and told to \"get cleared by doctor\". Did not make an appt for evaluation until today. No testing done . Was told cannot return to work until cleared for all work functions and \"I cannot do my job\"  Reports work injury in January and was referred to workers comp provider.   Follows with pain management for chronic back issues. Was advised by Dr. Sellers (spine/pain management) that he would not complete functional capacity eval and would not manage any work limitations or any issues related to work injury.   Pt adamant that needs to be on disability  Advised that I will not be completing any disability paperwork and I am not evaluating her physical capabilities,not addressing any work related issues, and not providing any work release related to ongoing back issues.   Advised that I can provide documentation that she was seen in office today for issues related to recent fall and sob, but then pt states does not need clearance because is not working. Then stated that she was hired as CNA at [employer] and only had job for a few weeks before injuring self and has not been able to return to work.   Stating here to be checked out from the fall and sob, and to get letter to return to work. Advised that I can provide a letter for return to work in regards to sob/fall based on today's visit. But then pt again stating, does not need letter to return to work because \"I cannot do my job and can't go to work. I never actually got the job and got hurt before starting\" . Reminded pt that at prior appt, she told me that she was working as CNA at [employer], but now stating that she couldn't do that job and is not working there. Unable to get clarification during this discussion.   Feels sob " "with conversation , activity, rest. Would like \"pump\" for when sob.   Sees pain management for ongoing issues with neck and back pain, Dr Sellers and will follow up with him for spine problem.     Fall  Pertinent negatives include no headaches.     Review of Systems   Respiratory:  Positive for shortness of breath.    Musculoskeletal:  Positive for back pain and neck pain.   Skin:  Negative for wound.   Neurological:  Negative for dizziness, light-headedness and headaches.       Current Outpatient Medications on File Prior to Visit   Medication Sig    ferrous sulfate 324 (65 Fe) mg Take 1 tablet (324 mg total) by mouth daily before breakfast    gabapentin (NEURONTIN) 400 mg capsule Take 1 capsule (400 mg total) by mouth daily at bedtime    meloxicam (MOBIC) 15 mg tablet Take 1 tablet (15 mg total) by mouth daily    methocarbamol (ROBAXIN) 750 mg tablet Take 1 tablet (750 mg total) by mouth every 6 (six) hours as needed for muscle spasms    methylPREDNISolone 4 MG tablet therapy pack Use as directed on package (Patient not taking: Reported on 5/14/2024)       Objective     /60 (BP Location: Right arm, Patient Position: Sitting, Cuff Size: Standard)   Pulse (!) 110   Temp 98.5 °F (36.9 °C)   Resp 16   Ht 5' 2\" (1.575 m)   Wt 59.4 kg (131 lb)   LMP 04/19/2024   SpO2 99%   BMI 23.96 kg/m²     Physical Exam  Vitals reviewed.   Constitutional:       General: She is not in acute distress.     Appearance: Normal appearance. She is not ill-appearing.   HENT:      Head: Atraumatic.   Eyes:      Extraocular Movements: Extraocular movements intact.      Pupils: Pupils are equal, round, and reactive to light.   Cardiovascular:      Rate and Rhythm: Normal rate and regular rhythm.   Pulmonary:      Effort: Pulmonary effort is normal. No respiratory distress.      Breath sounds: Normal breath sounds. No wheezing.      Comments: Oxygen saturation on room air 99%  RR 16 at rest.  No conversational dyspnea. "     Musculoskeletal:         General: No tenderness or deformity. Normal range of motion.      Cervical back: Normal range of motion and neck supple. No tenderness.      Comments: No vertebral point tenderness     Skin:     General: Skin is warm and dry.      Findings: No bruising.      Comments: No visible or palpable abrasions or wounds to back or scalp   Neurological:      General: No focal deficit present.      Mental Status: She is alert and oriented to person, place, and time.      Cranial Nerves: No cranial nerve deficit.      Sensory: No sensory deficit.      Motor: No weakness.      Coordination: Coordination normal.      Gait: Gait normal.   Psychiatric:      Comments: Pt with flight of ideas, no consistency in narrative.   Very difficult to follow conversation           JOSE Matthew

## 2024-05-16 ENCOUNTER — TELEPHONE (OUTPATIENT)
Age: 44
End: 2024-05-16

## 2024-05-16 ENCOUNTER — APPOINTMENT (OUTPATIENT)
Dept: PHYSICAL THERAPY | Facility: CLINIC | Age: 44
End: 2024-05-16
Payer: COMMERCIAL

## 2024-05-21 ENCOUNTER — APPOINTMENT (OUTPATIENT)
Dept: RADIOLOGY | Facility: CLINIC | Age: 44
End: 2024-05-21
Payer: COMMERCIAL

## 2024-05-21 ENCOUNTER — OFFICE VISIT (OUTPATIENT)
Dept: PHYSICAL THERAPY | Facility: CLINIC | Age: 44
End: 2024-05-21
Payer: COMMERCIAL

## 2024-05-21 DIAGNOSIS — R06.02 SOB (SHORTNESS OF BREATH): ICD-10-CM

## 2024-05-21 DIAGNOSIS — M54.9 CHRONIC BACK PAIN, UNSPECIFIED BACK LOCATION, UNSPECIFIED BACK PAIN LATERALITY: ICD-10-CM

## 2024-05-21 DIAGNOSIS — M25.69 BACK STIFFNESS: ICD-10-CM

## 2024-05-21 DIAGNOSIS — M43.6 STIFFNESS OF CERVICAL SPINE: ICD-10-CM
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
## 2024-05-21 DIAGNOSIS — M54.12 CERVICAL RADICULOPATHY: Primary | ICD-10-CM

## 2024-05-21 DIAGNOSIS — G89.29 CHRONIC BACK PAIN, UNSPECIFIED BACK LOCATION, UNSPECIFIED BACK PAIN LATERALITY: ICD-10-CM

## 2024-05-21 PROCEDURE — 97112 NEUROMUSCULAR REEDUCATION: CPT

## 2024-05-21 PROCEDURE — 71046 X-RAY EXAM CHEST 2 VIEWS: CPT

## 2024-05-21 PROCEDURE — 97110 THERAPEUTIC EXERCISES: CPT

## 2024-05-21 PROCEDURE — 72100 X-RAY EXAM L-S SPINE 2/3 VWS: CPT

## 2024-05-21 NOTE — PROGRESS NOTES
"Daily Note     Today's date: 2024  Patient name: Kvng Quevedo  : 1980  MRN: [id number]  Referring provider: Marcos Sellers MD  Dx:   Encounter Diagnosis     ICD-10-CM    1. Cervical radiculopathy  M54.12       2. Stiffness of cervical spine  M43.6       3. Back stiffness  M25.69                      Subjective: Pt states that at her MD appointment 2024 they ordered an x-ray of lower back; she has an appointment with doctor. She states that she was prescribed inhaler which has been helping.  States that she feels good in the morning after doing her stretches.       Objective: See treatment diary below      Assessment: Pt demonstrates improved tolerance to supine cervical retraction, scap retraction and shoulder ER today; able to complete each exercise without rest t/o set. Pt unable to pass 90 deg shoulder flexion in supine w/ cane bilaterally due to pain. Unable to tolerate wall slides. Introduced to low rows and shoulder extension with theraband resistance held by PT. Tolerated treatment well. Patient would benefit from continued PT.       Plan: Continue per plan of care.  Progress treatment as tolerated.       Precautions: History reviewed. No pertinent past medical history.  History reviewed. No pertinent surgical history.    SOC: 2024  FOTO: 2024  POC Expiration: 2024   Daily Treatment Log  Date: Initial Evaluation 2024  Next session     Visit#/ auth: 1  2  3/12       Objective Measures              Mechanical assessment             Manuals                                                                     Neuro Re-Ed    20'  20'       Supine chin tuck   5\" x 10   5\" x 10        Seated chin tuck   5\" x 10       Supine scap retraction   5\" x 10   5\" x 10        supine shoulder ER AROM    1 x 15  5\" x 15       Seated row      1 x 10 YTB         Std shoulder ext      1 x 10 YTB pt standing w/ back against wall                                   Ther Ex  10'  20'  20' " "       Cervical flex/ ext AROM    1 x 10   1 x 10         Cervical rotation AROM    1 x 10 R/L  1 x 10 R/L       Cervical side bend AROM      1 x 10 R/L       Slouch OC   1 x 10   1 x10        Supine flex w/ cane   1 x 10 B/L w/ cane  1 x 10 B/L w/ cane (to 90 deg flex d/t pain)       SL open books   1 x 10 R/L modified w/ hand on thigh/hip 1 x 10 R/L modified w/ hand on thigh/hip       Corner stretch    10\" x 5      wall slides      unable to tolerate d/t pain       EDUCATION: Pathology, review of impairements, prognosis, activity modification, POC, and HEP  KE  HEP updated and reviewed  HEP updated and reviewed       Ther Activity              Posture education                           Gait Training                                         Modalities                                         HEP:   Access Code: [email or web address]  URL: [email or web address]  Date: 05/21/2024  Prepared by: Yenifer Richardson    Exercises  - Supine Chin Tuck  - 2 x daily - 1 sets - 10 reps - 5 seconds hold  - Supine Scapular Retraction  - 2 x daily - 1 sets - 10 reps - 5 seconds hold  - Head Nods  - 2 x daily - 1 sets - 10 reps  - Seated Cervical Rotation AROM  - 2 x daily - 1 sets - 10 reps  - Sidelying Open Book  - 2 x daily - 1 sets - 10 reps  - Slouch overcorrect on CHAIR  - 2 x daily - 1 sets - 10 reps           "

## 2024-05-23 ENCOUNTER — OFFICE VISIT (OUTPATIENT)
Dept: PHYSICAL THERAPY | Facility: CLINIC | Age: 44
End: 2024-05-23
Payer: COMMERCIAL

## 2024-05-23 DIAGNOSIS — M54.12 CERVICAL RADICULOPATHY: Primary | ICD-10-CM

## 2024-05-23 DIAGNOSIS — M43.6 STIFFNESS OF CERVICAL SPINE: ICD-10-CM

## 2024-05-23 DIAGNOSIS — M25.69 BACK STIFFNESS: ICD-10-CM

## 2024-05-23 PROCEDURE — 97112 NEUROMUSCULAR REEDUCATION: CPT | Performed by: PHYSICAL THERAPIST

## 2024-05-23 PROCEDURE — 97110 THERAPEUTIC EXERCISES: CPT | Performed by: PHYSICAL THERAPIST

## 2024-05-23 NOTE — PROGRESS NOTES
"Daily Note     Today's date: 2024  Patient name: Kvng Quevedo  : 1980  MRN: [id number]  Referring provider: Marcos Sellers MD  Dx:   Encounter Diagnosis     ICD-10-CM    1. Cervical radiculopathy  M54.12       2. Stiffness of cervical spine  M43.6       3. Back stiffness  M25.69                      Subjective: Pt states she \"feels it in her neck\" (upper trap) w/ most of her neck AROM. She reports addition of B/L shld flex stand w/ pball on table is really good stretch for her shoulders, arms and back.       Objective: See treatment diary below; discussed appointments/scheduling - advised pt to try to schedule ahead and not wait until her last scheduled appt to make additional appointments (which is why her therapist called her earlier this week) as our schedule fills up and we have nothing to offer her next week.      Assessment: Tolerated treatment well and does require posture correction during most of her sitting exercises to correct moderate forward head . Patient would benefit from continued PT. Shld flex ROM appears better ana w/ pball vs supine, but pt is benefiting from light strengthening w/ supine version.      Plan: Continue per plan of care.  Progress treatment as tolerated.       Precautions: History reviewed. No pertinent past medical history.  History reviewed. No pertinent surgical history.    SOC: 2024  FOTO: 2024  POC Expiration: 2024   Daily Treatment Log  Date: Initial Evaluation 2024     Visit#/ auth: 1  2  3/12  4/12     Objective Measures              Mechanical assessment             Manuals                                                                     Neuro Re-Ed    20'  20'  15'     Supine chin tuck   5\" x 10   5\" x 10   5\"x10     Seated chin tuck   5\" x 10       Supine scap retraction   5\" x 10   5\" x 10        supine shoulder ER AROM    1 x 15  5\" x 15  sit 5\"x10     Seated row      1 x 10 YTB   1x10 YTB      Std shoulder ext    " "  1 x 10 YTB pt standing w/ back against wall  back against wall 1x10 YTB                                 Ther Ex  10'  20'  20'  30'      Cervical flex/ ext AROM    1 x 10   1 x 10   1x10 corrected posture first      Cervical rotation AROM    1 x 10 R/L  1 x 10 R/L  1x10 R/L     Cervical side bend AROM      1 x 10 R/L  1x10 R/L     Slouch OC   1 x 10   1 x10   1x10     Supine flex w/ cane   1 x 10 B/L w/ cane  1 x 10 B/L w/ cane (to 90 deg flex d/t pain)  1x10 B/L flexion self HHA     SL open books   1 x 10 R/L modified w/ hand on thigh/hip 1 x 10 R/L modified w/ hand on thigh/hip       Corner stretch    10\" x 5 10\"x5     wall slides      unable to tolerate d/t pain  stand B/L shld flex w/ pball on table 10\"x15 (pt chooses to inc reps from 10-15)     EDUCATION: Pathology, review of impairements, prognosis, activity modification, POC, and HEP  KE  HEP updated and reviewed  HEP updated and reviewed       Ther Activity              Posture education                           Gait Training                                         Modalities                                         HEP:   Access Code: [email or web address]  URL: [email or web address]  Date: 05/21/2024  Prepared by: Yenifer Richardson    Exercises  - Supine Chin Tuck  - 2 x daily - 1 sets - 10 reps - 5 seconds hold  - Supine Scapular Retraction  - 2 x daily - 1 sets - 10 reps - 5 seconds hold  - Head Nods  - 2 x daily - 1 sets - 10 reps  - Seated Cervical Rotation AROM  - 2 x daily - 1 sets - 10 reps  - Sidelying Open Book  - 2 x daily - 1 sets - 10 reps  - Slouch overcorrect on CHAIR  - 2 x daily - 1 sets - 10 reps             "

## 2024-05-28 ENCOUNTER — OFFICE VISIT (OUTPATIENT)
Dept: PHYSICAL THERAPY | Facility: CLINIC | Age: 44
End: 2024-05-28
Payer: COMMERCIAL

## 2024-05-28 DIAGNOSIS — M43.6 STIFFNESS OF CERVICAL SPINE: ICD-10-CM

## 2024-05-28 DIAGNOSIS — M54.12 CERVICAL RADICULOPATHY: Primary | ICD-10-CM

## 2024-05-28 PROCEDURE — 97112 NEUROMUSCULAR REEDUCATION: CPT | Performed by: PHYSICAL THERAPIST

## 2024-05-28 PROCEDURE — 97110 THERAPEUTIC EXERCISES: CPT | Performed by: PHYSICAL THERAPIST

## 2024-05-28 NOTE — PROGRESS NOTES
"Daily Note     Today's date: 2024  Patient name: Kvng Quevedo  : 1980  MRN: [id number]  Referring provider: Marcos Sellers MD  Dx:   Encounter Diagnosis     ICD-10-CM    1. Cervical radiculopathy  M54.12       2. Stiffness of cervical spine  M43.6                      Subjective: Pt cont to c/o pain in cerv/pec/upper trap and into arms w/ cerv and UE ROM. She is using contrast of cold pack then heat at home with good short term relief.      Objective: See treatment diary below; updated HEP to trial dec reps w/ inc hold times. Advised pt to provide feedback next visit.      Assessment: Tolerated treatment well. Patient would benefit from continued PT and trial of inc hold times/dec reps w/ exercises - HEP updated and reviewed.      Plan: Progress treatment as tolerated.       Precautions: History reviewed. No pertinent past medical history.  History reviewed. No pertinent surgical history.    SOC: 2024  FOTO: 2024  POC Expiration: 2024   Daily Treatment Log  Date: Initial Evaluation 2024   Visit#/ auth: 1  2  3/12  4/12  5/12   Objective Measures              Mechanical assessment             Manuals              myofascial release supine           30\"x35'                                             Neuro Re-Ed    20'  20'  15'  20'   Supine chin tuck   5\" x 10   5\" x 10   5\"x10  10\"x5   Seated chin tuck   5\" x 10       Supine scap retraction   5\" x 10   5\" x 10        supine shoulder ER AROM    1 x 15  5\" x 15  sit 5\"x10  supine 10\"x5 neutral   Seated row      1 x 10 YTB   1x10 YTB      Std shoulder ext      1 x 10 YTB pt standing w/ back against wall  back against wall 1x10 YTB  back against wall YTB 1x10   B/L shld ER vs TB      Back against wall YTB 1x10    stand bkwd rolls          1# DB's 1x10                 Ther Ex  10'  20'  20'  30'  20'    Cervical flex/ ext AROM    1 x 10   1 x 10   1x10 corrected posture first      Cervical rotation AROM    " "1 x 10 R/L  1 x 10 R/L  1x10 R/L  10\"x5 ea R/L supine   Cervical side bend AROM      1 x 10 R/L  1x10 R/L     Slouch OC   1 x 10   1 x10   1x10     Supine flex w/ cane   1 x 10 B/L w/ cane  1 x 10 B/L w/ cane (to 90 deg flex d/t pain)  1x10 B/L flexion self HHA     SL open books   1 x 10 R/L modified w/ hand on thigh/hip 1 x 10 R/L modified w/ hand on thigh/hip    10\"x5 R/L; pillow btwn knees   Corner stretch    10\" x 5 10\"x5 10\"x5    wall slides      unable to tolerate d/t pain  stand B/L shld flex w/ pball on table 10\"x15 (pt chooses to inc reps from 10-15)  pball on table flexion and V's R/L 1x10 ea   EDUCATION: Pathology, review of impairements, prognosis, activity modification, POC, and HEP  KE  HEP updated and reviewed  HEP updated and reviewed    update HEP (dec reps/inc hold times)   Ther Activity              Posture education                           Gait Training                                         Modalities                                         HEP:   Access Code: [email or web address]  URL: [email or web address]  Date: 05/28/2024  Prepared by: Emma Mascorro    Exercises  - Supine Chin Tuck  - 2 x daily - 1 sets - 5 reps - 10 seconds hold  - Supine Scapular Retraction  - 2 x daily - 1 sets - 10 reps - 5 seconds hold  - Head Nods  - 2 x daily - 1 sets - 10 reps  - Seated Cervical Rotation AROM  - 2 x daily - 5 reps - 10 hold  - Sidelying Open Book  - 2 x daily - 1 sets - 5 reps - 10 hold  - Slouch overcorrect on CHAIR  - 2 x daily - 1 sets - 10 reps           "

## 2024-05-29 ENCOUNTER — CONSULT (OUTPATIENT)
Dept: PULMONOLOGY | Facility: MEDICAL CENTER | Age: 44
End: 2024-05-29
Payer: COMMERCIAL

## 2024-05-29 VITALS
WEIGHT: 130 LBS | DIASTOLIC BLOOD PRESSURE: 64 MMHG | OXYGEN SATURATION: 99 % | HEIGHT: 62 IN | BODY MASS INDEX: 23.92 KG/M2 | SYSTOLIC BLOOD PRESSURE: 104 MMHG | RESPIRATION RATE: 16 BRPM | HEART RATE: 79 BPM | TEMPERATURE: 97.8 F

## 2024-05-29 DIAGNOSIS — R06.02 SOB (SHORTNESS OF BREATH): ICD-10-CM

## 2024-05-29 DIAGNOSIS — M54.12 CERVICAL RADICULOPATHY: Primary | ICD-10-CM

## 2024-05-29 DIAGNOSIS — R07.89 CHEST DISCOMFORT: ICD-10-CM

## 2024-05-29 PROCEDURE — 99243 OFF/OP CNSLTJ NEW/EST LOW 30: CPT | Performed by: STUDENT IN AN ORGANIZED HEALTH CARE EDUCATION/TRAINING PROGRAM

## 2024-05-29 PROCEDURE — 94010 BREATHING CAPACITY TEST: CPT | Performed by: STUDENT IN AN ORGANIZED HEALTH CARE EDUCATION/TRAINING PROGRAM

## 2024-05-29 NOTE — PATIENT INSTRUCTIONS
It was a pleasure seeing you today!    Refer to pain management   Follow up as needed     Bruce Bearden MD  Pulmonary and Critical Care Medicine

## 2024-05-29 NOTE — PROGRESS NOTES
Pulmonary Outpatient Consultation Note   Kvng Quevedo 43 y.o. female MRN: [id number]  5/29/2024    Referring provider:   Av Matthew  [address] [place] [address]  [address]  [place] [address]     Reason for Consultation:  Chest pain    No problem-specific Assessment & Plan notes found for this encounter.      Assessment:    Non pulmonary related chest pain, states it from a trauma to her chest in 2022 when police were kneeling on her. She showed me a right upper chest wound/rash/discoloration. She says its tender and is always bothering her. I looked at her CXR - no obvious rib fractures. Could consider costochronidirits but for that to last 2 years is odd.  We did an office spirometry, she did poorly on that so her numbers are all over the place with a obstructive pattern with reduced FEV1 and FVC.  Her expiratory and expiratory loops were effort dependent.  I do not think there is a pulmonary component to this.  She is a lifetime non-smoker, no history of lung disease, no family history of lung disease.  She was recently prescribed rescue inhaler and has used it 3 times and says it sometimes helps.  I advised her she could use Motrin for possible costochondritis and that she should follow-up with her pain medicine team.  She requested a referral back to the pain medicine team which I placed.  No further pulmonary workup    Plan:    Patient requested pain management referral  In office spirometry performed  Can use rescue inhaler as needed as prescribed by primary care  Follow-up as needed      History of Present Illness   HPI:    43-year-old female referred from primary care for dyspnea.  Past medical history includes chronic pain, cervical radiculopathy and spinal stenosis in the cervical region following with pain management.  Review of other primary care documentation, it seems that she has possible paranoid schizophrenia and was hospitalized at [facility] facilities in the past.    Recently seen at primary care  office complaining of right-sided chest discomfort associated breathing issues.  He was further pulmonary for this reason.  Currently she is here with her friend in the room.  Stating that she has a hard time breathing and speaking because of right-sided chest discomfort that happened when police kneeled on her chest 2 years ago.  Since then this has been bothering her.  She is unclear if she sought emergency room attention for this, she says he did see cardiology and said that everything was okay.  She kept showing me her right chest wound over and over.  She also says that she has neck pain that goes down to her coccyx, says she has right-sided chest discomfort that radiates into her armpit.  It is noted that she sees pain management for these issues and primary care.  She says he is currently out of work due to disability.    In office spirometry performed today, obstructive process but this was effort dependent.  She has no history of smoking    No PFT on file  No echocardiogram on file  Chest x-ray reviewed from May 2024, no acute chest disease  Labs reviewed, peripheral eosinophilia of 0.12      Review of Systems   Constitutional: Negative.    HENT: Negative.     Eyes: Negative.    Respiratory:  Positive for chest tightness.    Cardiovascular:  Positive for chest pain.   Gastrointestinal: Negative.    Endocrine: Negative.    Genitourinary: Negative.    Musculoskeletal:  Positive for back pain and neck pain.   Skin:  Positive for color change and rash.        Right-sided chest   Allergic/Immunologic: Negative.    Neurological: Negative.    Hematological: Negative.    Psychiatric/Behavioral: Negative.           Historical Information   History reviewed. No pertinent past medical history.  Possible psychiatric history  History reviewed. No pertinent surgical history.  Family History   Problem Relation Age of Onset   • Diabetes Mother        Occupational History: Disabled    Social History     Tobacco Use  "  Smoking Status Never   • Passive exposure: Never   Smokeless Tobacco Never       Meds/Allergies     Current Outpatient Medications:   •  albuterol (Ventolin HFA) 90 mcg/act inhaler, Inhale 2 puffs every 6 (six) hours as needed for shortness of breath, Disp: 18 g, Rfl: 0  •  ferrous sulfate 324 (65 Fe) mg, Take 1 tablet (324 mg total) by mouth daily before breakfast, Disp: 90 tablet, Rfl: 3  •  gabapentin (NEURONTIN) 400 mg capsule, Take 1 capsule (400 mg total) by mouth daily at bedtime, Disp: 30 capsule, Rfl: 0  •  meloxicam (MOBIC) 15 mg tablet, Take 1 tablet (15 mg total) by mouth daily, Disp: 30 tablet, Rfl: 1  •  methocarbamol (ROBAXIN) 750 mg tablet, Take 1 tablet (750 mg total) by mouth every 6 (six) hours as needed for muscle spasms, Disp: 120 tablet, Rfl: 1  No Known Allergies    Vitals: Blood pressure 104/64, pulse 79, temperature 97.8 °F (36.6 °C), temperature source Temporal, resp. rate 16, height 5' 2\" (1.575 m), weight 59 kg (130 lb), last menstrual period 04/19/2024, SpO2 99%., Body mass index is 23.78 kg/m². Oxygen Therapy  SpO2: 99 %    Physical Exam:    GEN: alert and oriented x 3, pleasant and cooperative   HEENT:  Normocephalic, atraumatic, anicteric  NECK: No JVD   HEART: Rate, normal S1 and S2  LUNGS: Clear to auscultation bilaterally; no wheezes, rales, or rhonchi; respiration nonlabored   ABDOMEN:  soft, no tenderness, no distention  EXTREMITIES: peripheral pulses palpable; no edema  NEURO: no gross focal findings  SKIN:  Dry, intact, warm to touch, right chest has dry nonerythematous we will    Labs: I have personally reviewed pertinent lab results.  No results found for: \"IGE\"    Imaging and other studies: I have personally reviewed pertinent films in PACS  Regular chest x-ray    Pulmonary function testing:  Personally interpreted by me  Obstructed but poor effort    Other Studies: I have personally reviewed pertinent reports.      Bruce Bearden MD  Pulmonary and Critical Care Medicine     "

## 2024-05-30 ENCOUNTER — TELEPHONE (OUTPATIENT)
Dept: FAMILY MEDICINE CLINIC | Facility: CLINIC | Age: 44
End: 2024-05-30

## 2024-05-30 NOTE — TELEPHONE ENCOUNTER
----- Message from JOSE Matthew sent at 5/30/2024  9:38 AM EDT -----  Please call pt and advise that cxr was normal (see note for spine xray- also normal)

## 2024-06-03 ENCOUNTER — TELEPHONE (OUTPATIENT)
Dept: PHYSICAL THERAPY | Facility: CLINIC | Age: 44
End: 2024-06-03

## 2024-06-03 NOTE — TELEPHONE ENCOUNTER
Patient called and cancelled this weeks appts due to a family emergency.  Confirmed 6/10.     Yenifer Richardson, PT, DPT

## 2024-06-04 ENCOUNTER — APPOINTMENT (OUTPATIENT)
Dept: PHYSICAL THERAPY | Facility: CLINIC | Age: 44
End: 2024-06-04
Payer: COMMERCIAL

## 2024-06-07 ENCOUNTER — APPOINTMENT (OUTPATIENT)
Dept: PHYSICAL THERAPY | Facility: CLINIC | Age: 44
End: 2024-06-07
Payer: COMMERCIAL

## 2024-06-10 ENCOUNTER — OFFICE VISIT (OUTPATIENT)
Dept: PHYSICAL THERAPY | Facility: CLINIC | Age: 44
End: 2024-06-10
Payer: COMMERCIAL

## 2024-06-10 DIAGNOSIS — M25.69 BACK STIFFNESS: ICD-10-CM

## 2024-06-10 DIAGNOSIS — M43.6 STIFFNESS OF CERVICAL SPINE: ICD-10-CM

## 2024-06-10 DIAGNOSIS — M54.12 CERVICAL RADICULOPATHY: Primary | ICD-10-CM

## 2024-06-10 PROCEDURE — 97112 NEUROMUSCULAR REEDUCATION: CPT

## 2024-06-10 PROCEDURE — 97110 THERAPEUTIC EXERCISES: CPT

## 2024-06-10 NOTE — PROGRESS NOTES
"Daily Note     Today's date: 6/10/2024  Patient name: Kvng Quevedo  : 1980  MRN: [id number]  Referring provider: Marcos Sellers MD  Dx:   Encounter Diagnosis     ICD-10-CM    1. Cervical radiculopathy  M54.12       2. Stiffness of cervical spine  M43.6       3. Back stiffness  M25.69                      Subjective: pt reports to session with pain in her neck 8/10 with ROM and resting pain 6/10 in R/L cerv. She reports she did not get to her exercises very much since she was here last to be able to trial and assess her response to the increased hold times of her stretches.       Objective: See treatment diary below      Assessment: Tolerated treatment well. Pt UE ROM remains limited in all planes due to pain. Attempted to progress table slides to wall slides, pt dem radicular pain into L hand. Pt requires cuing to perform the correct number of reps t/o session.  Pt to trial HEP of increased hold times and assess and report back next session. Patient would benefit from continued PT      Plan: Continue per plan of care.      Precautions: History reviewed. No pertinent past medical history.  History reviewed. No pertinent surgical history.    SOC: 2024  FOTO: 2024  POC Expiration: 2024   Daily Treatment Log  Date: 6/10/2024  2024 2024  2024   Visit#/ auth: 6/12   3/12  4/12  5/12   Objective Measures             Mechanical assessment            Manuals             myofascial release supine          30\"x35'                                         Neuro Re-Ed 25'    20'  15'  20'   Supine chin tuck 10\"x5    5\" x 10   5\"x10  10\"x5   Seated chin tuck         Supine scap retraction    5\" x 10        supine shoulder ER AROM Neutral 10\"x5 B/L    5\" x 15  sit 5\"x10  supine 10\"x5 neutral   Seated row      1 x 10 YTB   1x10 YTB      Std shoulder ext  back against wall YTB 1x10    1 x 10 YTB pt standing w/ back against wall  back against wall 1x10 YTB  back against wall YTB 1x10   B/L shld " "ER vs TB  Back against wall YTB 1x10     Back against wall YTB 1x10    stand bkwd rolls          1# DB's 1x10    seated thoracic ext over MB  1x10           Ther Ex 15'    20'  30'  20'    Cervical flex/ ext AROM    1 x 10   1x10 corrected posture first      Cervical rotation AROM  10\"x5 ea R/L supine   1 x 10 R/L  1x10 R/L  10\"x5 ea R/L supine   Cervical side bend AROM     1 x 10 R/L  1x10 R/L     Slouch OC     1 x10   1x10     Supine flex w/ cane  5\"x10 w/ cane    1 x 10 B/L w/ cane (to 90 deg flex d/t pain)  1x10 B/L flexion self HHA     SL open books  10\"x5 R/L; pillow btwn knees  1 x 10 R/L modified w/ hand on thigh/hip    10\"x5 R/L; pillow btwn knees   Corner stretch  10\"x5   10\" x 5 10\"x5 10\"x5    wall slides  pball on table flexion and V's R/L 1x10 ea     unable to tolerate d/t pain  stand B/L shld flex w/ pball on table 10\"x15 (pt chooses to inc reps from 10-15)  pball on table flexion and V's R/L 1x10 ea   EDUCATION: Pathology, review of impairements, prognosis, activity modification, POC, and HEP    HEP updated and reviewed    update HEP (dec reps/inc hold times)   Ther Activity              Posture education                           Gait Training                                         Modalities                                         HEP:   Access Code: [email or web address]  URL: [email or web address]  Date: 05/28/2024  Prepared by: Emma Mascorro    Exercises  - Supine Chin Tuck  - 2 x daily - 1 sets - 5 reps - 10 seconds hold  - Supine Scapular Retraction  - 2 x daily - 1 sets - 10 reps - 5 seconds hold  - Head Nods  - 2 x daily - 1 sets - 10 reps  - Seated Cervical Rotation AROM  - 2 x daily - 5 reps - 10 hold  - Sidelying Open Book  - 2 x daily - 1 sets - 5 reps - 10 hold  - Slouch overcorrect on CHAIR  - 2 x daily - 1 sets - 10 reps             "

## 2024-06-13 ENCOUNTER — EVALUATION (OUTPATIENT)
Dept: PHYSICAL THERAPY | Facility: CLINIC | Age: 44
End: 2024-06-13
Payer: COMMERCIAL

## 2024-06-13 DIAGNOSIS — M25.69 BACK STIFFNESS: ICD-10-CM

## 2024-06-13 DIAGNOSIS — M54.12 CERVICAL RADICULOPATHY: Primary | ICD-10-CM

## 2024-06-13 DIAGNOSIS — M43.6 STIFFNESS OF CERVICAL SPINE: ICD-10-CM

## 2024-06-13 PROCEDURE — 97110 THERAPEUTIC EXERCISES: CPT
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
## 2024-06-14 ENCOUNTER — APPOINTMENT (EMERGENCY)
Dept: RADIOLOGY | Facility: HOSPITAL | Age: 44
End: 2024-06-14
Payer: COMMERCIAL

## 2024-06-14 ENCOUNTER — NURSE TRIAGE (OUTPATIENT)
Age: 44
End: 2024-06-14

## 2024-06-14 ENCOUNTER — HOSPITAL ENCOUNTER (EMERGENCY)
Facility: HOSPITAL | Age: 44
Discharge: HOME | End: 2024-06-14
Attending: EMERGENCY MEDICINE
Payer: COMMERCIAL

## 2024-06-14 VITALS
RESPIRATION RATE: 20 BRPM | HEART RATE: 82 BPM | TEMPERATURE: 98.4 F | OXYGEN SATURATION: 98 % | DIASTOLIC BLOOD PRESSURE: 81 MMHG | SYSTOLIC BLOOD PRESSURE: 124 MMHG

## 2024-06-14 DIAGNOSIS — J32.9 SINUSITIS: ICD-10-CM

## 2024-06-14 DIAGNOSIS — M54.9 BACK PAIN: ICD-10-CM
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
## 2024-06-14 LAB
ALBUMIN SERPL BCP-MCNC: 4.3 G/DL (ref 3.5–5)
ALP SERPL-CCNC: 43 U/L (ref 34–104)
ALT SERPL W P-5'-P-CCNC: 17 U/L (ref 7–52)
ANION GAP SERPL CALCULATED.3IONS-SCNC: 7 MMOL/L (ref 4–13)
AST SERPL W P-5'-P-CCNC: 22 U/L (ref 13–39)
BACTERIA UR QL AUTO: ABNORMAL /HPF
BASOPHILS # BLD AUTO: 0.05 THOUSANDS/ÂΜL (ref 0–0.1)
BASOPHILS NFR BLD AUTO: 1 % (ref 0–1)
BILIRUB SERPL-MCNC: 0.64 MG/DL (ref 0.2–1)
BILIRUB UR QL STRIP: NEGATIVE
BUN SERPL-MCNC: 14 MG/DL (ref 5–25)
CALCIUM SERPL-MCNC: 8.7 MG/DL (ref 8.4–10.2)
CARDIAC TROPONIN I PNL SERPL HS: 3 NG/L
CHLORIDE SERPL-SCNC: 103 MMOL/L (ref 96–108)
CLARITY UR: CLEAR
CO2 SERPL-SCNC: 26 MMOL/L (ref 21–32)
COLOR UR: YELLOW
CREAT SERPL-MCNC: 0.54 MG/DL (ref 0.6–1.3)
EOSINOPHIL # BLD AUTO: 0.1 THOUSAND/ÂΜL (ref 0–0.61)
EOSINOPHIL NFR BLD AUTO: 1 % (ref 0–6)
ERYTHROCYTE [DISTWIDTH] IN BLOOD BY AUTOMATED COUNT: 14.1 % (ref 11.6–15.1)
GFR SERPL CREATININE-BSD FRML MDRD: 117 ML/MIN/1.73SQ M
GLUCOSE SERPL-MCNC: 96 MG/DL (ref 65–140)
GLUCOSE UR STRIP-MCNC: NEGATIVE MG/DL
HCT VFR BLD AUTO: 41.3 % (ref 34.8–46.1)
HGB BLD-MCNC: 13.9 G/DL (ref 11.5–15.4)
HGB UR QL STRIP.AUTO: NEGATIVE
IMM GRANULOCYTES # BLD AUTO: 0.03 THOUSAND/UL (ref 0–0.2)
IMM GRANULOCYTES NFR BLD AUTO: 0 % (ref 0–2)
KETONES UR STRIP-MCNC: ABNORMAL MG/DL
LEUKOCYTE ESTERASE UR QL STRIP: ABNORMAL
LYMPHOCYTES # BLD AUTO: 2.3 THOUSANDS/ÂΜL (ref 0.6–4.47)
LYMPHOCYTES NFR BLD AUTO: 23 % (ref 14–44)
MCH RBC QN AUTO: 30.5 PG (ref 26.8–34.3)
MCHC RBC AUTO-ENTMCNC: 33.7 G/DL (ref 31.4–37.4)
MCV RBC AUTO: 91 FL (ref 82–98)
MONOCYTES # BLD AUTO: 0.55 THOUSAND/ÂΜL (ref 0.17–1.22)
MONOCYTES NFR BLD AUTO: 6 % (ref 4–12)
NEUTROPHILS # BLD AUTO: 6.85 THOUSANDS/ÂΜL (ref 1.85–7.62)
NEUTS SEG NFR BLD AUTO: 69 % (ref 43–75)
NITRITE UR QL STRIP: NEGATIVE
NON-SQ EPI CELLS URNS QL MICRO: ABNORMAL /HPF
NRBC BLD AUTO-RTO: 0 /100 WBCS
PH UR STRIP.AUTO: 6 [PH]
PLATELET # BLD AUTO: 280 THOUSANDS/UL (ref 149–390)
PMV BLD AUTO: 9.6 FL (ref 8.9–12.7)
POTASSIUM SERPL-SCNC: 3.3 MMOL/L (ref 3.5–5.3)
PROT SERPL-MCNC: 7.2 G/DL (ref 6.4–8.4)
PROT UR STRIP-MCNC: NEGATIVE MG/DL
RBC # BLD AUTO: 4.56 MILLION/UL (ref 3.81–5.12)
RBC #/AREA URNS AUTO: ABNORMAL /HPF
SODIUM SERPL-SCNC: 136 MMOL/L (ref 135–147)
SP GR UR STRIP.AUTO: 1.02 (ref 1–1.03)
UROBILINOGEN UR STRIP-ACNC: <2 MG/DL
WBC # BLD AUTO: 9.88 THOUSAND/UL (ref 4.31–10.16)
WBC #/AREA URNS AUTO: ABNORMAL /HPF

## 2024-06-14 PROCEDURE — 80053 COMPREHEN METABOLIC PANEL: CPT | Performed by: EMERGENCY MEDICINE

## 2024-06-14 PROCEDURE — 85025 COMPLETE CBC W/AUTO DIFF WBC: CPT | Performed by: EMERGENCY MEDICINE

## 2024-06-14 PROCEDURE — 99284 EMERGENCY DEPT VISIT MOD MDM: CPT | Performed by: EMERGENCY MEDICINE

## 2024-06-14 PROCEDURE — 70450 CT HEAD/BRAIN W/O DYE: CPT

## 2024-06-14 PROCEDURE — 70486 CT MAXILLOFACIAL W/O DYE: CPT

## 2024-06-14 PROCEDURE — 36415 COLL VENOUS BLD VENIPUNCTURE: CPT | Performed by: EMERGENCY MEDICINE

## 2024-06-14 PROCEDURE — 72220 X-RAY EXAM SACRUM TAILBONE: CPT

## 2024-06-14 PROCEDURE — 71045 X-RAY EXAM CHEST 1 VIEW: CPT

## 2024-06-14 PROCEDURE — 96360 HYDRATION IV INFUSION INIT: CPT

## 2024-06-14 PROCEDURE — 99284 EMERGENCY DEPT VISIT MOD MDM: CPT

## 2024-06-14 PROCEDURE — 81001 URINALYSIS AUTO W/SCOPE: CPT | Performed by: EMERGENCY MEDICINE

## 2024-06-14 PROCEDURE — 84484 ASSAY OF TROPONIN QUANT: CPT | Performed by: EMERGENCY MEDICINE

## 2024-06-14 RX ORDER — AMOXICILLIN AND CLAVULANATE POTASSIUM 875; 125 MG/1; MG/1
1 TABLET, FILM COATED ORAL EVERY 12 HOURS
Qty: 14 TABLET | Refills: 0 | Status: SHIPPED | OUTPATIENT
Start: 2024-06-14 | End: 2024-06-21

## 2024-06-14 RX ADMIN — SODIUM CHLORIDE 1000 ML: 0.9 INJECTION, SOLUTION INTRAVENOUS at 18:58

## 2024-06-14 NOTE — TELEPHONE ENCOUNTER
"Patient fell on 6/12.  [place] dizzy, lost consciousness and fell.  Was out for \"a bit\".  Family in house calling her name and she responded, but confused.  Felt weak after event.  No loss of bowel or bladder.  Patient states BP was high at PT that day.  She calls in today with continued weakness, headache and now coughing up small amts of blood.  RN advised patient be driven to the ED now for further evaluation.  Patient verbalized understanding and is going to [place] ED.      Reason for Disposition   ACUTE NEUROLOGIC SYMPTOM and now fine    Answer Assessment - Initial Assessment Questions  1. MECHANISM: \"How did the injury happen?\" For falls, ask: \"What height did you fall from?\" and \"What surface did you fall against?\"       Just walking, became dizzy and fell.  Blacked out.   2. ONSET: \"When did the injury happen?\" (Minutes or hours ago)       6/12/2024 11 pm  3. NEUROLOGIC SYMPTOMS: \"Was there any loss of consciousness?\" \"Are there any other neurological symptoms?\"       Yes,  Weak. Headache.  Physical therapy said BP was ankit on 6/12  4. MENTAL STATUS: \"Does the person know who he is, who you are, and where he is?\"       Woke up from fall and was confused, cleared up.   5. LOCATION: \"What part of the head was hit?\"       Unknown  6. SCALP APPEARANCE: \"What does the scalp look like? Is it bleeding now?\" If Yes, ask: \"Is it difficult to stop?\"       Denies cuts  7. SIZE: For cuts, bruises, or swelling, ask: \"How large is it?\" (e.g., inches or centimeters)       Bruise on shoulder  8. PAIN: \"Is there any pain?\" If Yes, ask: \"How bad is it?\"  (e.g., Scale 1-10; or mild, moderate, severe)      Pain in tailbone  9. TETANUS: For any breaks in the skin, ask: \"When was the last tetanus booster?\"      N/A  10. OTHER SYMPTOMS: \"Do you have any other symptoms?\" (e.g., neck pain, vomiting)        Now has a bloody cough.Started today.    11. PREGNANCY: \"Is there any chance you are pregnant?\" \"When was your last menstrual " "period?\"        No    Protocols used: Head Injury-ADULT-[place]    "

## 2024-06-14 NOTE — ED PROVIDER NOTES
History  Chief Complaint   Patient presents with    Fall     Fall 6/12 and states in April as well. Unable to ascertain exactly what happened each time. Describing many things    Cough     States has a cough and has discomfort thru her rectum. .states coughing up some blood     43-year-old female states that since 2022 she has had several episodes of falling.  She states she is not sure why she falls.  Sometimes she gets injured during the fall and she states just recently she fell and she fell backwards landing on her buttocks and then struck the back of her head since that time every time she coughs she has pain in her buttocks and she has continuous head pain.  Unknown loss of consciousness no fevers chills nausea vomiting diarrhea no other complaints.  Patient is awake and alert states she has been to her PCP a few times for this and they have told her nothing.        Prior to Admission Medications   Prescriptions Last Dose Informant Patient Reported? Taking?   albuterol (Ventolin HFA) 90 mcg/act inhaler  Self No No   Sig: Inhale 2 puffs every 6 (six) hours as needed for shortness of breath   ferrous sulfate 324 (65 Fe) mg  Self No No   Sig: Take 1 tablet (324 mg total) by mouth daily before breakfast   gabapentin (NEURONTIN) 400 mg capsule  Self No No   Sig: Take 1 capsule (400 mg total) by mouth daily at bedtime   meloxicam (MOBIC) 15 mg tablet  Self No No   Sig: Take 1 tablet (15 mg total) by mouth daily   methocarbamol (ROBAXIN) 750 mg tablet  Self No No   Sig: Take 1 tablet (750 mg total) by mouth every 6 (six) hours as needed for muscle spasms      Facility-Administered Medications: None       History reviewed. No pertinent past medical history.    History reviewed. No pertinent surgical history.    Family History   Problem Relation Age of Onset    Diabetes Mother      I have reviewed and agree with the history as documented.    E-Cigarette/Vaping    E-Cigarette Use Never User      E-Cigarette/Vaping  Substances    Nicotine No     THC No     CBD No     Flavoring No     Other No     Unknown No      Social History     Tobacco Use    Smoking status: Never     Passive exposure: Never    Smokeless tobacco: Never   Vaping Use    Vaping status: Never Used   Substance Use Topics    Alcohol use: Never    Drug use: Never       Review of Systems   Constitutional:  Negative for activity change, chills, diaphoresis and fever.   HENT:  Negative for congestion, ear pain, nosebleeds, sore throat, trouble swallowing and voice change.    Eyes:  Negative for pain, discharge and redness.   Respiratory:  Positive for cough. Negative for apnea, choking, shortness of breath, wheezing and stridor.    Cardiovascular:  Negative for chest pain and palpitations.   Gastrointestinal:  Negative for abdominal distention, abdominal pain, constipation, diarrhea, nausea and vomiting.   Endocrine: Negative for polydipsia.   Genitourinary:  Negative for difficulty urinating, dysuria, flank pain, frequency, hematuria and urgency.   Musculoskeletal:  Positive for back pain. Negative for gait problem, joint swelling, myalgias, neck pain and neck stiffness.   Skin:  Negative for pallor and rash.   Neurological:  Negative for dizziness, tremors, syncope, speech difficulty, weakness, numbness and headaches.   Hematological:  Negative for adenopathy.   Psychiatric/Behavioral:  Negative for confusion, hallucinations, self-injury and suicidal ideas. The patient is not nervous/anxious.        Physical Exam  Physical Exam  Vitals and nursing note reviewed.   Constitutional:       General: She is not in acute distress.     Appearance: She is well-developed. She is not diaphoretic.   HENT:      Head: Normocephalic and atraumatic.      Right Ear: External ear normal.      Left Ear: External ear normal.      Nose: Nose normal.   Eyes:      Conjunctiva/sclera: Conjunctivae normal.      Pupils: Pupils are equal, round, and reactive to light.   Cardiovascular:       Rate and Rhythm: Normal rate and regular rhythm.      Heart sounds: Normal heart sounds.   Pulmonary:      Effort: Pulmonary effort is normal.      Breath sounds: Normal breath sounds.   Abdominal:      General: Bowel sounds are normal.      Palpations: Abdomen is soft.      Tenderness: There is no abdominal tenderness.   Musculoskeletal:         General: Normal range of motion.      Cervical back: Normal range of motion and neck supple.   Skin:     General: Skin is warm and dry.   Neurological:      Mental Status: She is alert and oriented to person, place, and time.      Deep Tendon Reflexes: Reflexes are normal and symmetric.         Vital Signs  ED Triage Vitals [06/14/24 1813]   Temperature Pulse Respirations Blood Pressure SpO2   98.4 °F (36.9 °C) (!) 110 22 139/97 97 %      Temp src Heart Rate Source Patient Position - Orthostatic VS BP Location FiO2 (%)   -- Monitor Sitting Right arm --      Pain Score       9           Vitals:    06/14/24 1813 06/14/24 2030 06/14/24 2227   BP: 139/97 142/68 124/81   Pulse: (!) 110 84 82   Patient Position - Orthostatic VS: Sitting Sitting Lying         Visual Acuity  Visual Acuity      Flowsheet Row Most Recent Value   L Pupil Size (mm) 3   R Pupil Size (mm) 3            ED Medications  Medications   sodium chloride 0.9 % bolus 1,000 mL (0 mL Intravenous Stopped 6/14/24 1958)       Diagnostic Studies  Results Reviewed       Procedure Component Value Units Date/Time    Urine Microscopic [[id number]]  (Abnormal) Collected: 06/14/24 2037    Lab Status: Final result Specimen: Urine, Other Updated: 06/14/24 2126     RBC, UA 0-1 /hpf      WBC, UA 10-20 /hpf      Epithelial Cells Innumerable /hpf      Bacteria, UA Moderate /hpf     UA (URINE) with reflex to Scope [[id number]]  (Abnormal) Collected: 06/14/24 2037    Lab Status: Final result Specimen: Urine, Other Updated: 06/14/24 2041     Color, UA Yellow     Clarity, UA Clear     Specific Gravity, UA 1.020     pH, UA 6.0      Leukocytes, UA Moderate     Nitrite, UA Negative     Protein, UA Negative mg/dl      Glucose, UA Negative mg/dl      Ketones, UA Trace mg/dl      Urobilinogen, UA <2.0 mg/dl      Bilirubin, UA Negative     Occult Blood, UA Negative    HS Troponin 0hr (reflex protocol) [[id number]]  (Normal) Collected: 06/14/24 1856    Lab Status: Final result Specimen: Blood from Arm, Left Updated: 06/14/24 1933     hs TnI 0hr 3 ng/L     Comprehensive metabolic panel [[id number]]  (Abnormal) Collected: 06/14/24 1856    Lab Status: Final result Specimen: Blood from Arm, Left Updated: 06/14/24 1933     Sodium 136 mmol/L      Potassium 3.3 mmol/L      Chloride 103 mmol/L      CO2 26 mmol/L      ANION GAP 7 mmol/L      BUN 14 mg/dL      Creatinine 0.54 mg/dL      Glucose 96 mg/dL      Calcium 8.7 mg/dL      AST 22 U/L      ALT 17 U/L      Alkaline Phosphatase 43 U/L      Total Protein 7.2 g/dL      Albumin 4.3 g/dL      Total Bilirubin 0.64 mg/dL      eGFR 117 ml/min/1.73sq m     Narrative:      National Kidney Disease Foundation guidelines for Chronic Kidney Disease (CKD):     Stage 1 with normal or high GFR (GFR > 90 mL/min/1.73 square meters)    Stage 2 Mild CKD (GFR = 60-89 mL/min/1.73 square meters)    Stage 3A Moderate CKD (GFR = 45-59 mL/min/1.73 square meters)    Stage 3B Moderate CKD (GFR = 30-44 mL/min/1.73 square meters)    Stage 4 Severe CKD (GFR = 15-29 mL/min/1.73 square meters)    Stage 5 End Stage CKD (GFR <15 mL/min/1.73 square meters)  Note: GFR calculation is accurate only with a steady state creatinine    CBC and differential [[id number]] Collected: 06/14/24 1856    Lab Status: Final result Specimen: Blood from Arm, Left Updated: 06/14/24 1910     WBC 9.88 Thousand/uL      RBC 4.56 Million/uL      Hemoglobin 13.9 g/dL      Hematocrit 41.3 %      MCV 91 fL      MCH 30.5 pg      MCHC 33.7 g/dL      RDW 14.1 %      MPV 9.6 fL      Platelets 280 Thousands/uL      nRBC 0 /100 WBCs      Segmented % 69 %      Immature Grans %  0 %      Lymphocytes % 23 %      Monocytes % 6 %      Eosinophils Relative 1 %      Basophils Relative 1 %      Absolute Neutrophils 6.85 Thousands/µL      Absolute Immature Grans 0.03 Thousand/uL      Absolute Lymphocytes 2.30 Thousands/µL      Absolute Monocytes 0.55 Thousand/µL      Eosinophils Absolute 0.10 Thousand/µL      Basophils Absolute 0.05 Thousands/µL                    XR chest 1 view portable   Final Result by Amy Kirby MD (06/14 2216)      No acute cardiopulmonary disease.            Workstation performed: [id number]         CT facial bones without contrast   Final Result by Anna Rudd MD (06/14 2218)      No evidence of acute traumatic injury to the facial bones.               Workstation performed: [id number]         CT head without contrast   Final Result by E. Alec Schoenberger, MD (06/14 2048)      No acute intracranial abnormality.   Hyperdense fluid level in the left maxillary sinus suspicious for hemorrhage. No fracture is visualized. Suggest facial bone CT for further evaluation.      The study was marked in EPIC for immediate notification.               Workstation performed: [id number]         XR sacrum and coccyx   Final Result by Stephane Sharif MD (06/15 0549)      No acute osseous abnormality.         Workstation performed: [id number]                    Procedures  Procedures         ED Course                               SBIRT 22yo+      Flowsheet Row Most Recent Value   Initial Alcohol Screen: US AUDIT-C     1. How often do you have a drink containing alcohol? 0 Filed at: 06/14/2024 1853   2. How many drinks containing alcohol do you have on a typical day you are drinking?  0 Filed at: 06/14/2024 1853   3a. Male UNDER 65: How often do you have five or more drinks on one occasion? 0 Filed at: 06/14/2024 1853   3b. FEMALE Any Age, or MALE 65+: How often do you have 4 or more drinks on one occassion? 0 Filed at: 06/14/2024 1853   Audit-C Score 0 Filed at: 06/14/2024  1853   CHRISTIE: How many times in the past year have you...    Used an illegal drug or used a prescription medication for non-medical reasons? Never Filed at: 06/14/2024 1853                      Medical Decision Making  Amount and/or Complexity of Data Reviewed  Labs: ordered.  Radiology: ordered.    Risk  Prescription drug management.             Disposition  Final diagnoses:   Fall, initial encounter   Back pain   Sinusitis     Time reflects when diagnosis was documented in both MDM as applicable and the Disposition within this note       Time User Action Codes Description Comment    6/14/2024 10:24 PM Damien Levin [W19.XXXA] Fall, initial encounter     6/14/2024 10:24 PM Damien Levin [M54.9] Back pain     6/14/2024 10:24 PM Damien Levin [J32.9] Sinusitis           ED Disposition       ED Disposition   Discharge    Condition   Stable    Date/Time   Fri Jun 14, 2024 2224    Comment   Kvng Quevedo discharge to home/self care.                   Follow-up Information       Follow up With Specialties Details Why Contact Info    [facility], Nurse Practitioner In 1 week  [address] [place] [id number]  [phone]              Discharge Medication List as of 6/14/2024 10:24 PM        START taking these medications    Details   amoxicillin-clavulanate (AUGMENTIN) 875-125 mg per tablet Take 1 tablet by mouth every 12 (twelve) hours for 7 days, Starting Fri 6/14/2024, Until Fri 6/21/2024, Normal           CONTINUE these medications which have NOT CHANGED    Details   albuterol (Ventolin HFA) 90 mcg/act inhaler Inhale 2 puffs every 6 (six) hours as needed for shortness of breath, Starting Tue 5/14/2024, Normal      ferrous sulfate 324 (65 Fe) mg Take 1 tablet (324 mg total) by mouth daily before breakfast, Starting Fri 3/22/2024, Normal      gabapentin (NEURONTIN) 400 mg capsule Take 1 capsule (400 mg total) by mouth daily at bedtime, Starting Thu 4/18/2024, Until  Mon 6/17/2024, Normal      meloxicam (MOBIC) 15 mg tablet Take 1 tablet (15 mg total) by mouth daily, Starting Wed 10/18/2023, Until Wed 5/29/2024, Normal      methocarbamol (ROBAXIN) 750 mg tablet Take 1 tablet (750 mg total) by mouth every 6 (six) hours as needed for muscle spasms, Starting Thu 4/18/2024, Until Mon 6/17/2024 at 2359, Normal             No discharge procedures on file.    PDMP Review       None            ED Provider  Electronically Signed by             Hussein Cervantes DO  06/15/24 2525

## 2024-06-18 ENCOUNTER — OFFICE VISIT (OUTPATIENT)
Dept: PHYSICAL THERAPY | Facility: CLINIC | Age: 44
End: 2024-06-18
Payer: COMMERCIAL

## 2024-06-18 DIAGNOSIS — M43.6 STIFFNESS OF CERVICAL SPINE: ICD-10-CM

## 2024-06-18 DIAGNOSIS — M54.12 CERVICAL RADICULOPATHY: Primary | ICD-10-CM

## 2024-06-18 DIAGNOSIS — M25.69 BACK STIFFNESS: ICD-10-CM

## 2024-06-18 PROCEDURE — 97110 THERAPEUTIC EXERCISES: CPT

## 2024-06-18 PROCEDURE — 97112 NEUROMUSCULAR REEDUCATION: CPT

## 2024-06-18 NOTE — PROGRESS NOTES
Daily Note     Today's date: 2024  Patient name: Kvng Quevedo  : 1980  MRN: [id number]  Referring provider: Marcos Sellers MD  Dx:   Encounter Diagnosis     ICD-10-CM    1. Cervical radiculopathy  M54.12       2. Stiffness of cervical spine  M43.6       3. Back stiffness  M25.69                      Subjective: Pt states that she is feeling better than how she was on her last visit 2024. After her PT appointment 24 she called her doctor. On 24 she reported to ED reporting fall. She was not admitted. Imaging was performed. Pt reports familiar pain on arrival to PT session today. States that she has not done her HEP recently.       Objective: See treatment diary below      Thoracic AROM limitation      2024  Thoracic extension  Max*  Deferred d/t pain Mod* (chest)  Thoracic flexion  Max*  Deferred d/t pain Mod* (low back)  Thoacic rotation R  Max*  Deferred d/t pain Mod  Thoracic rotation L  Mod  Deferred d/t pain min      Assessment: Pt went to ED 24. X-ray of sacrum and coccyx: no acute osseous abnormality. CT head without contrast: no acute intracranial abnormality. CT facial bones without contrast: no evidence of acute traumatic injury to the facial bones. X ray chest portable: no acute cardiopulmonary disease. Per conversation with PCP, pt cleared to be seen in PT today. Pt instructed to schedule with MD per instructions provided in ED. Pt demonstrates improving shoulder flexion AAROM as set progressed. Pt continues to benefit from back against wall with resisted shoulder exercises for external postural cue; she demonstrates familiarity with resisted shoulder ER and shoulder extension today. Tolerated treatment well. Patient would benefit from continued PT.       Plan: Continue per plan of care.  Progress treatment as tolerated.       Precautions: History reviewed. No pertinent past medical history.  History reviewed. No pertinent surgical  "history.    SOC: 5/9/2024  FOTO: 5/9/2024  POC Expiration: 8/1/2024   Daily Treatment Log  Date: 6/10/2024 6/13/2024 6/18/2024 Next session  5/28/2024   Visit#/ auth: 6/12 7/12 8/12 5/12   Objective Measures           Mechanical assessment          Manuals           myofascial release supine        30\"x35'                                   Neuro Re-Ed 25'  35' 30'   20'   Supine chin tuck 10\"x5   10\"x5    10\"x5   Seated chin tuck         Supine scap retraction   5\" x 10       supine shoulder ER AROM Neutral 10\"x5 B/L   Neutral 5\"x10 B/L    supine 10\"x5 neutral   Seated row     back against wall YTB 1x10       Std shoulder ext  back against wall YTB 1x10   back against wall YTB 1x10   back against wall YTB 1x10   B/L shld ER vs TB  Back against wall YTB 1x10   back against wall YTB 1x10  Back against wall YTB 1x10    stand bkwd rolls     1# DB's 1x10   1# DB's 1x10    seated thoracic ext over MB  1x10         Ther Ex 15'   15'   20'    Cervical flex/ ext AROM          Cervical rotation AROM  10\"x5 ea R/L supine  3\"x 10 ea R/L supine   10\"x5 ea R/L supine   Cervical side bend AROM          Slouch OC          Supine flex w/ cane  5\"x10 w/ cane   1x10 w/ cane       SL open books  10\"x5 R/L; pillow btwn knees  10\"x5 R/L; hand on hip, pillow btwn knees   10\"x5 R/L; pillow btwn knees   Corner stretch  10\"x5     10\"x5    wall slides  pball on table flexion and V's R/L 1x10 ea       pball on table flexion and V's R/L 1x10 ea   Objective measures  Cervical AROM, Strength, posture, gait      EDUCATION: Pathology, review of impairements, prognosis, activity modification, POC, and HEP  KE HEP updated and reviewed   update HEP (dec reps/inc hold times)   Ther Activity            Posture education                       Gait Training                                   Modalities                                   HEP:   Access Code: [email or web address]  URL: [email or web address]  Date: 05/28/2024  Prepared by: Emma" Subha    Exercises  - Supine Chin Tuck  - 2 x daily - 1 sets - 5 reps - 10 seconds hold  - Supine Scapular Retraction  - 2 x daily - 1 sets - 10 reps - 5 seconds hold  - Head Nods  - 2 x daily - 1 sets - 10 reps  - Seated Cervical Rotation AROM  - 2 x daily - 5 reps - 10 hold  - Sidelying Open Book  - 2 x daily - 1 sets - 5 reps - 10 hold  - Slouch overcorrect on CHAIR  - 2 x daily - 1 sets - 10 reps

## 2024-06-20 ENCOUNTER — OFFICE VISIT (OUTPATIENT)
Dept: OBGYN CLINIC | Facility: CLINIC | Age: 44
End: 2024-06-20
Payer: COMMERCIAL

## 2024-06-20 VITALS
WEIGHT: 127 LBS | BODY MASS INDEX: 23.37 KG/M2 | HEIGHT: 62 IN | SYSTOLIC BLOOD PRESSURE: 134 MMHG | DIASTOLIC BLOOD PRESSURE: 98 MMHG

## 2024-06-20 DIAGNOSIS — Z11.3 ROUTINE SCREENING FOR STI (SEXUALLY TRANSMITTED INFECTION): ICD-10-CM

## 2024-06-20 DIAGNOSIS — Z71.85 HPV VACCINE COUNSELING: ICD-10-CM

## 2024-06-20 DIAGNOSIS — Z01.419 WOMEN'S ANNUAL ROUTINE GYNECOLOGICAL EXAMINATION: Primary | ICD-10-CM

## 2024-06-20 DIAGNOSIS — Z12.4 SCREENING FOR CERVICAL CANCER: ICD-10-CM

## 2024-06-20 DIAGNOSIS — Z31.69 ENCOUNTER FOR PRECONCEPTION CONSULTATION: ICD-10-CM

## 2024-06-20 PROCEDURE — 99386 PREV VISIT NEW AGE 40-64: CPT | Performed by: STUDENT IN AN ORGANIZED HEALTH CARE EDUCATION/TRAINING PROGRAM

## 2024-06-20 RX ORDER — PNV NO.95/FERROUS FUM/FOLIC AC 28MG-0.8MG
1 TABLET ORAL DAILY
Qty: 90 TABLET | Refills: 3 | Status: SHIPPED | OUTPATIENT
Start: 2024-06-20
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
## 2024-06-21 ENCOUNTER — OFFICE VISIT (OUTPATIENT)
Dept: PHYSICAL THERAPY | Facility: CLINIC | Age: 44
End: 2024-06-21
Payer: COMMERCIAL

## 2024-06-21 ENCOUNTER — OFFICE VISIT (OUTPATIENT)
Dept: FAMILY MEDICINE CLINIC | Facility: CLINIC | Age: 44
End: 2024-06-21
Payer: COMMERCIAL

## 2024-06-21 VITALS
SYSTOLIC BLOOD PRESSURE: 124 MMHG | HEART RATE: 76 BPM | HEIGHT: 62 IN | DIASTOLIC BLOOD PRESSURE: 72 MMHG | RESPIRATION RATE: 18 BRPM | BODY MASS INDEX: 23.48 KG/M2 | WEIGHT: 127.6 LBS | TEMPERATURE: 97.2 F

## 2024-06-21 DIAGNOSIS — R42 DIZZINESS: ICD-10-CM

## 2024-06-21 DIAGNOSIS — J01.00 ACUTE NON-RECURRENT MAXILLARY SINUSITIS: Primary | ICD-10-CM

## 2024-06-21 DIAGNOSIS — M54.9 CHRONIC BACK PAIN, UNSPECIFIED BACK LOCATION, UNSPECIFIED BACK PAIN LATERALITY: ICD-10-CM

## 2024-06-21 DIAGNOSIS — M54.12 CERVICAL RADICULOPATHY: Primary | ICD-10-CM

## 2024-06-21 DIAGNOSIS — M54.12 CERVICAL RADICULOPATHY: ICD-10-CM

## 2024-06-21 DIAGNOSIS — E87.6 HYPOKALEMIA: ICD-10-CM

## 2024-06-21 DIAGNOSIS — G89.29 CHRONIC BACK PAIN, UNSPECIFIED BACK LOCATION, UNSPECIFIED BACK PAIN LATERALITY: ICD-10-CM

## 2024-06-21 DIAGNOSIS — R29.6 RECURRENT FALLS: ICD-10-CM

## 2024-06-21 DIAGNOSIS — R63.4 WEIGHT LOSS: ICD-10-CM

## 2024-06-21 DIAGNOSIS — E61.1 IRON DEFICIENCY: ICD-10-CM

## 2024-06-21 DIAGNOSIS — M43.6 STIFFNESS OF CERVICAL SPINE: ICD-10-CM

## 2024-06-21 DIAGNOSIS — M25.69 BACK STIFFNESS: ICD-10-CM

## 2024-06-21 PROCEDURE — 99214 OFFICE O/P EST MOD 30 MIN: CPT | Performed by: NURSE PRACTITIONER

## 2024-06-21 PROCEDURE — 97112 NEUROMUSCULAR REEDUCATION: CPT

## 2024-06-21 PROCEDURE — 97110 THERAPEUTIC EXERCISES: CPT

## 2024-06-21 NOTE — PATIENT INSTRUCTIONS
Continue physical therapy  Follow up with Dr Sellers, pain management.   Finish antibiotics to treat sinus infection.   Schedule appt with ENT for ongoing sinus issues and dizziness.   Will check labs as discussed.   Continue medications as per pain management.

## 2024-06-21 NOTE — PROGRESS NOTES
Ambulatory Visit  Name: Kvng Quevedo      : 1980      MRN: [id number]  Encounter Provider: JOSE Matthew  Encounter Date: 2024   Encounter department: [facility]    Assessment & Plan   1. Acute non-recurrent maxillary sinusitis  Finish antibiotics  - Ambulatory Referral to Otolaryngology; Future    2. Recurrent falls  Continue physical therapy.     3. Cervical radiculopathy  Management per pain management/Dr Sellers. Continue current meds. Continue PT    4. Chronic back pain, unspecified back location, unspecified back pain laterality  Management per pain management/Dr Sellers. Continue current meds. Continue PT    5. Dizziness  Exam wnl. Vs wnl. Subjective. Continue PT. Eval by ENT.   - Ambulatory Referral to Otolaryngology; Future  - Comprehensive metabolic panel; Future    6. Weight loss  - TSH, 3rd generation; Future    7. Hypokalemia  - Comprehensive metabolic panel; Future    8. Iron deficiency  - Iron    Patient was counseled regarding instructions for management which included: impression/diagnosis, risk/benefits of treatment options, importance of compliance with treatment, risk factor reduction, and prognosis.   I have reviewed the instructions with the patient answering all questions and patient verbalized understanding.       History of Present Illness     Here for ED follow up  Seen at [facility]  after fall. Pt reports multiple falls, none witnessed.   Multiple xrays done and negative.   Was also c/o dizziness and cough , reporting coughing up blood at times. CXR normal. CT scan head did show sinus infection . Was started on abx- augmentin.   Sees pain management, Dr. Sellers  Still c/o pain tailbone.   Shooting pains into hands.   Losing weight. Lost 4 lbs in past month  Not currently working. Has limitation of no lifting greater than 10 lbs. States trying to get permanent disability.   Going to physical therapy.   Taking robaxin, meloxicam, and  "gabapentin            Review of Systems   Constitutional:  Positive for appetite change, fatigue and unexpected weight change.   HENT:  Positive for sinus pressure.    Eyes:         Wears glasses   Respiratory:  Positive for chest tightness.    Cardiovascular:  Negative for chest pain, palpitations and leg swelling.   Gastrointestinal:  Negative for abdominal pain.   Genitourinary:  Negative for dysuria.   Musculoskeletal:  Positive for back pain and neck pain.   Skin:  Negative for rash and wound.   Allergic/Immunologic: Negative for immunocompromised state.   Neurological:  Positive for dizziness, light-headedness and numbness (hands).   Psychiatric/Behavioral:  The patient is nervous/anxious.        Objective     /72   Pulse 76   Temp (!) 97.2 °F (36.2 °C)   Resp 18   Ht 5' 2\" (1.575 m)   Wt 57.9 kg (127 lb 9.6 oz)   LMP 06/06/2024 (Exact Date)   BMI 23.34 kg/m²    ED records reviewed, CINDY, 6/14  43-year-old female states that since 2022 she has had several episodes of falling. She states she is not sure why she falls. Sometimes she gets injured during the fall and she states just recently she fell and she fell backwards landing on her buttocks and then struck the back of her head since that time every time she coughs she has pain in her buttocks and she has continuous head pain. Unknown loss of consciousness no fevers chills nausea vomiting diarrhea no other complaints. Patient is awake and alert states she has been to her PCP a few times for this and they have told her nothing.   XR chest 1 view portable   Final Result by Amy Kirby MD (06/14 2216)       No acute cardiopulmonary disease.               Workstation performed: [id number]           CT facial bones without contrast   Final Result by Anna Rudd MD (06/14 2218)       No evidence of acute traumatic injury to the facial bones.                   Workstation performed: [id number]           CT head without contrast   Final Result " by E. Alec Schoenberger, MD (06/14 2048)       No acute intracranial abnormality.   Hyperdense fluid level in the left maxillary sinus suspicious for hemorrhage. No fracture is visualized. Suggest facial bone CT for further evaluation.       The study was marked in EPIC for immediate notification.                   Workstation performed: [id number]           XR sacrum and coccyx   Final Result by Stephane Sharif MD (06/15 0549)       No acute osseous abnormality.     Physical Exam  Vitals reviewed.   Constitutional:       General: She is not in acute distress.     Appearance: She is not ill-appearing.   Cardiovascular:      Rate and Rhythm: Normal rate and regular rhythm.   Pulmonary:      Effort: Pulmonary effort is normal. No respiratory distress.      Breath sounds: Normal breath sounds. No wheezing or rales.   Abdominal:      General: There is no distension.      Palpations: Abdomen is soft.   Musculoskeletal:      Cervical back: Normal range of motion and neck supple.      Right lower leg: No edema.      Left lower leg: No edema.   Skin:     General: Skin is warm and dry.      Coloration: Skin is not jaundiced or pale.   Neurological:      General: No focal deficit present.      Mental Status: She is alert and oriented to person, place, and time.      Cranial Nerves: Cranial nerve deficit present.      Sensory: No sensory deficit.      Coordination: Coordination normal.      Gait: Gait normal.       Administrative Statements

## 2024-06-21 NOTE — PROGRESS NOTES
"Daily Note     Today's date: 2024  Patient name: Kvng Quevedo  : 1980  MRN: [id number]  Referring provider: Marcos Sellers MD  Dx:   Encounter Diagnosis     ICD-10-CM    1. Cervical radiculopathy  M54.12       2. Stiffness of cervical spine  M43.6       3. Back stiffness  M25.69                      Subjective: pt reports that her daughter has been home with her and she has been encouraging for her to perform her HEP more consistently, she feels that her chest is feeling better. Her neck feels mostly the same.  Reports that she is cont to get dizzy and it occurred wed of last week, she saw the MD fri. Pt inquired about her records being sent to her for her job due to being out of work, pt advised we are not allowed to send her medica records and was given the contact information for [facility] medical records dept.       Objective: See treatment diary below      Assessment: Tolerated treatment well. Pt cont to dem limited mobility in all planes of cervical, UE and thoracic ROM due to pain. Pt does present with some dizziness upon supine to sit tx today that did resolve with seated rest. Pt edu to cont to monitor her dizziness symptoms as they come. Cont to progress global mobility as able to increase functional mobility. Patient would benefit from continued PT      Plan: Continue per plan of care.      Precautions: History reviewed. No pertinent past medical history.  History reviewed. No pertinent surgical history.    SOC: 2024  FOTO: 2024  POC Expiration: 2024   Daily Treatment Log  Date: 6/10/2024 2024 2024 2024    Visit#/ auth:     Objective Measures          Mechanical assessment         Manuals          myofascial release supine                                         Neuro Re-Ed 25'  35' 30' 25'    Supine chin tuck 10\"x5   10\"x5  10\"x5     Seated chin tuck         Supine scap retraction   5\" x 10      supine shoulder ER AROM Neutral 10\"x5 B/L   " "Neutral 5\"x10 B/L      Seated row     back against wall YTB 1x10 Yellow tubing 1x10 std      Std shoulder ext  back against wall YTB 1x10   back against wall YTB 1x10 Yellow tubing 1x10 std    B/L shld ER vs TB  Back against wall YTB 1x10   back against wall YTB 1x10 Std B/L ER AROM 5\"x10    YTB 1x10       stand bkwd rolls     1# DB's 1x10 1# DB 2x10      seated thoracic ext over MB  1x10        Ther Ex 15'   15' 15'    Pulleys     3'      Cervical flex/ ext AROM         Cervical rotation AROM  10\"x5 ea R/L supine  3\"x 10 ea R/L supine 3\"x10  R/L  supine     Cervical side bend AROM          Slouch OC          Supine flex w/ cane  5\"x10 w/ cane   1x10 w/ cane  5\"x10 w/ cane     SL open books  10\"x5 R/L; pillow btwn knees  10\"x5 R/L; hand on hip, pillow btwn knees 10\"x5 R/L; hand on hip     Corner stretch  10\"x5         wall slides  pball on table flexion and V's R/L 1x10 ea          Objective measures  Cervical AROM, Strength, posture, gait      EDUCATION: Pathology, review of impairements, prognosis, activity modification, POC, and HEP  KE HEP updated and reviewed     Ther Activity            Posture education                       Gait Training                                   Modalities                                   HEP:   Access Code: [email or web address]  URL: [email or web address]  Date: 05/28/2024  Prepared by: Emma Mascorro    Exercises  - Supine Chin Tuck  - 2 x daily - 1 sets - 5 reps - 10 seconds hold  - Supine Scapular Retraction  - 2 x daily - 1 sets - 10 reps - 5 seconds hold  - Head Nods  - 2 x daily - 1 sets - 10 reps  - Seated Cervical Rotation AROM  - 2 x daily - 5 reps - 10 hold  - Sidelying Open Book  - 2 x daily - 1 sets - 5 reps - 10 hold  - Slouch overcorrect on CHAIR  - 2 x daily - 1 sets - 10 reps           "

## 2024-06-25 LAB
C TRACH RRNA CVX QL NAA+PROBE: NEGATIVE
CYTOLOGIST CVX/VAG CYTO: NORMAL
DX ICD CODE: NORMAL
N GONORRHOEA RRNA CVX QL NAA+PROBE: NEGATIVE
OTHER STN SPEC: NORMAL
OTHER STN SPEC: NORMAL
PATH REPORT.FINAL DX SPEC: NORMAL
SL AMB NOTE:: NORMAL
SL AMB SPECIMEN ADEQUACY: NORMAL
SL AMB TEST METHODOLOGY: NORMAL
T VAGINALIS RRNA SPEC QL NAA+PROBE: NEGATIVE

## 2024-06-26 ENCOUNTER — OFFICE VISIT (OUTPATIENT)
Dept: PHYSICAL THERAPY | Facility: CLINIC | Age: 44
End: 2024-06-26
Payer: COMMERCIAL

## 2024-06-26 DIAGNOSIS — M43.6 STIFFNESS OF CERVICAL SPINE: ICD-10-CM

## 2024-06-26 DIAGNOSIS — M25.69 BACK STIFFNESS: ICD-10-CM

## 2024-06-26 DIAGNOSIS — M54.12 CERVICAL RADICULOPATHY: Primary | ICD-10-CM

## 2024-06-26 PROCEDURE — 97110 THERAPEUTIC EXERCISES: CPT

## 2024-06-26 PROCEDURE — 97112 NEUROMUSCULAR REEDUCATION: CPT

## 2024-06-26 NOTE — PROGRESS NOTES
"Daily Note     Today's date: 2024  Patient name: Kvng Quevedo  : 1980  MRN: [id number]  Referring provider: Marcos Sellers MD  Dx:   Encounter Diagnosis     ICD-10-CM    1. Cervical radiculopathy  M54.12       2. Stiffness of cervical spine  M43.6       3. Back stiffness  M25.69                      Subjective: pt reports things are so/so, things are feeling the same in regard to her pain since last visit. She feels more mobility in her chest, the pain in the neck still does feel \"stabbing\" at times.  Pt reports that she was able to get to [place] today for an appt that she has been not able to make since last year.       Objective: See treatment diary below      Assessment: Tolerated treatment well. Pt remains limited in UE ROM due to pain mostly in chest. Additional reps of postural strengthening tolerated well. Progressed cerv ROM to seated today. Cont to progress global UE mobility as able. Patient would benefit from continued PT      Plan: Continue per plan of care.      Precautions: History reviewed. No pertinent past medical history.  History reviewed. No pertinent surgical history.    SOC: 2024  FOTO: 2024  POC Expiration: 2024   Daily Treatment Log  Date: 6/10/2024 2024 2024 2024 2024   Visit#/ auth: 6/12 7/12 8/12 9/12 10/12   Objective Measures          Mechanical assessment         Manuals          myofascial release supine                                         Neuro Re-Ed 25'  35' 30' 25' 25'    Supine chin tuck 10\"x5   10\"x5  10\"x5  Seated w/ self OP 5\"x10    Supine B/L horz abd in 45 flex      5\"x10    Seated chin tuck         Supine scap retraction   5\" x 10      supine shoulder ER AROM Neutral 10\"x5 B/L   Neutral 5\"x10 B/L      Seated row     back against wall YTB 1x10 Yellow tubing 1x10 std  Yellow tubing 2x10     Std shoulder ext  back against wall YTB 1x10   back against wall YTB 1x10 Yellow tubing 1x10 std Yellow tubing 2x10    B/L shld ER vs TB  " "Back against wall YTB 1x10   back against wall YTB 1x10 Std B/L ER AROM 5\"x10    YTB 1x10   B/L ER AROM 5\"x10     stand bkwd rolls     1# DB's 1x10 1# DB 2x10  2# DB 15x     seated thoracic ext over MB  1x10        Ther Ex 15'   15' 15' 13'   Pulleys     3'  3'   Pball roll outs      5\"x10 fwd/R/L     Cervical flex/ ext AROM         Cervical rotation AROM  10\"x5 ea R/L supine  3\"x 10 ea R/L supine 3\"x10  R/L  supine  Seated 5\"x10 w/ self OP    Cervical side bend AROM          Slouch OC          Supine flex w/ cane  5\"x10 w/ cane   1x10 w/ cane  5\"x10 w/ cane  5\"x10 w/ cane    SL open books  10\"x5 R/L; pillow btwn knees  10\"x5 R/L; hand on hip, pillow btwn knees 10\"x5 R/L; hand on hip     Corner stretch  10\"x5     90/90 w/ cane 5\"x10     wall slides  pball on table flexion and V's R/L 1x10 ea          Objective measures  Cervical AROM, Strength, posture, gait      EDUCATION: Pathology, review of impairements, prognosis, activity modification, POC, and HEP  KE HEP updated and reviewed     Ther Activity            Posture education                       Gait Training                                   Modalities                                   HEP:   Access Code: [email or web address]  URL: [email or web address]  Date: 05/28/2024  Prepared by: Emma Mascorro    Exercises  - Supine Chin Tuck  - 2 x daily - 1 sets - 5 reps - 10 seconds hold  - Supine Scapular Retraction  - 2 x daily - 1 sets - 10 reps - 5 seconds hold  - Head Nods  - 2 x daily - 1 sets - 10 reps  - Seated Cervical Rotation AROM  - 2 x daily - 5 reps - 10 hold  - Sidelying Open Book  - 2 x daily - 1 sets - 5 reps - 10 hold  - Slouch overcorrect on CHAIR  - 2 x daily - 1 sets - 10 reps             "

## 2024-07-02 ENCOUNTER — OFFICE VISIT (OUTPATIENT)
Dept: PHYSICAL THERAPY | Facility: CLINIC | Age: 44
End: 2024-07-02
Payer: COMMERCIAL

## 2024-07-02 DIAGNOSIS — M54.12 CERVICAL RADICULOPATHY: Primary | ICD-10-CM

## 2024-07-02 DIAGNOSIS — M43.6 STIFFNESS OF CERVICAL SPINE: ICD-10-CM

## 2024-07-02 DIAGNOSIS — M25.69 BACK STIFFNESS: ICD-10-CM

## 2024-07-02 PROCEDURE — 97110 THERAPEUTIC EXERCISES: CPT

## 2024-07-02 PROCEDURE — 97112 NEUROMUSCULAR REEDUCATION: CPT

## 2024-07-02 NOTE — PROGRESS NOTES
"Daily Note     Today's date: 2024  Patient name: Kvng Quevedo  : 1980  MRN: [id number]  Referring provider: Marcos Sellers MD  Dx:   Encounter Diagnosis     ICD-10-CM    1. Cervical radiculopathy  M54.12       2. Stiffness of cervical spine  M43.6       3. Back stiffness  M25.69                      Subjective: Pt arrives 10 minutes late to session; pt accommodated. On arrival to session pt has familiar pain located at her neck and straight down to her tailbone. She says that her chest feels looser since last session. She has more mobility. She has appointment with pain management doctor coming up. She feels physical therapy is helping.       Objective: See treatment diary below      Assessment: Pt continues to demonstrate facial grimacing with exercises stretching pectoralis muscle. She was able to increase resistance of backward shoulder rolls, rows, and shoulder extension today. Pt educated on benefit of pain management techniques in address chronic pain; informed pt that there are physical therapists that specialize in this if this is something she is looking to pursue. Tolerated treatment well. Patient would benefit from continued PT.       Plan: Continue per plan of care.  Progress treatment as tolerated.       Precautions: History reviewed. No pertinent past medical history.  History reviewed. No pertinent surgical history.    SOC: 2024  FOTO: 2024  POC Expiration: 2024   Daily Treatment Log  Date: 2024 Next session 2024   Visit#/ auth:   8/12 9/12 10/12   Objective Measures         Mechanical assessment        Manuals         myofascial release supine                                      Neuro Re-Ed 15'  30' 25' 25'    Supine chin tuck 10\" x 5   10\"x5  10\"x5  Seated w/ self OP 5\"x10    Supine B/L horz abd in 45 flex      5\"x10    Seated chin tuck         Supine scap retraction   5\" x 10      supine shoulder ER AROM   Neutral 5\"x10 B/L      Seated row " "Std red tubing 1 x 12  back against wall YTB 1x10 Yellow tubing 1x10 std  Yellow tubing 2x10     Std shoulder ext Std red tubing 1 x 12  back against wall YTB 1x10 Yellow tubing 1x10 std Yellow tubing 2x10    B/L shld ER vs TB  B/L ER AROM 5\"x10   back against wall YTB 1x10 Std B/L ER AROM 5\"x10    YTB 1x10   B/L ER AROM 5\"x10     stand bkwd rolls 3# 2 x 8    1# DB's 1x10 1# DB 2x10  2# DB 15x     seated thoracic ext over MB         Ther Ex 20'  15' 15' 13'   Pulleys  3'   3'  3'   Pball roll outs  5\" x 10 fwd    5\"x10 fwd/R/L     Cervical flex/ ext AROM         Cervical rotation AROM Seated 5\"x10 w/ self OP   3\"x 10 ea R/L supine 3\"x10  R/L  supine  Seated 5\"x10 w/ self OP    Cervical side bend AROM         Slouch OC         Supine flex w/ cane 5\"x10 w/ cane  1x10 w/ cane  5\"x10 w/ cane  5\"x10 w/ cane    SL open books 10\"x5 R/L; hand on hip   10\"x5 R/L; hand on hip, pillow btwn knees 10\"x5 R/L; hand on hip     Corner stretch  5\" x 10    90/90 w/ cane 5\"x10     wall slides         Objective measures        EDUCATION: Pathology, review of impairements, prognosis, activity modification, POC, and HEP   HEP updated and reviewed     Ther Activity          Posture education                   Gait Training                             Modalities                             HEP:   Access Code: [email or web address]  URL: [email or web address]  Date: 05/28/2024  Prepared by: Emma Mascorro    Exercises  - Supine Chin Tuck  - 2 x daily - 1 sets - 5 reps - 10 seconds hold  - Supine Scapular Retraction  - 2 x daily - 1 sets - 10 reps - 5 seconds hold  - Head Nods  - 2 x daily - 1 sets - 10 reps  - Seated Cervical Rotation AROM  - 2 x daily - 5 reps - 10 hold  - Sidelying Open Book  - 2 x daily - 1 sets - 5 reps - 10 hold  - Slouch overcorrect on CHAIR  - 2 x daily - 1 sets - 10 reps               "

## 2024-07-03 LAB — HPV I/H RISK 4 DNA CVX QL PROBE+SIG AMP: NEGATIVE

## 2024-07-05 ENCOUNTER — EVALUATION (OUTPATIENT)
Dept: PHYSICAL THERAPY | Facility: CLINIC | Age: 44
End: 2024-07-05
Payer: COMMERCIAL

## 2024-07-05 DIAGNOSIS — M54.12 CERVICAL RADICULOPATHY: Primary | ICD-10-CM

## 2024-07-05 DIAGNOSIS — M43.6 STIFFNESS OF CERVICAL SPINE: ICD-10-CM

## 2024-07-05 DIAGNOSIS — M25.69 BACK STIFFNESS: ICD-10-CM

## 2024-07-05 NOTE — PROGRESS NOTES
Daily Note     Today's date: 2024  Patient name: Kvng Quevedo  : 1980  MRN: [id number]  Referring provider: Marcos Sellers MD  Dx:   Encounter Diagnosis     ICD-10-CM    1. Cervical radiculopathy  M54.12       2. Stiffness of cervical spine  M43.6       3. Back stiffness  M25.69                      Subjective: Pt states that she had a bloody nose this morning. She says that last time she was at the ER they found something with her sinuses. She saw her doctor after her last ER visit. She was given a referral to ENT doctor; she has yet to see them. Today she reports neck pain and tailbone pain. She mentions that medication she is taking makes her tired. She states that the left side of her neck is bothering her. She says that she also experiences numbness into her hands and cramping. When she was trying to braid her daughters hair the other day she had cramping in her hands preventing her from being able to finish. She does note improvement since beginning current episode of PT. She says that she has more mobility allowing her to walk around more. Today pt rates pain 6/10 at rest, 5/10 at best with medication and rest, and 7/10 at worst recently. Later in session after testing thoracic AROM and episode of coughing/ gagging, pt reports that she had a L sided headache this morning and that her eyes feel weak. Pt does not report dizziness, yet expresses feeling off. After assessing pt's vitals and informing her of HR, she says that she has been seen by cardiologist before and that she is supposed to follow up with them.      Objective: See treatment diary below    Vitals:   2024   BP = 118/80 mmHg   HR = 80 bpm   SpO2 = 98%  2024 (end of session)  BP = 118/80 mmHg   HR = 107 to 92 bpm   SpO2 = 96%  2024   After bout of coughing/ gagging:  BP = 130/94 mmHg   HR = 85 - 95 bpm   SpO2 = 99%   Temperature: 98.4 degrees fahrenheit  After rest following episode of coughing/gagging:  BP = 130/84  mmHg   HR = 89 bpm   SpO2 = 98%     Gait:   5/9/2024: Antalgic, slowed  6/13/2024: Antalgic, slowed  7/5/2024: Antalgic, speed improving       Cervical AROM limitation     5/9/2024 6/13/2024 7/5/2024      (Pt instructed to stop if painful) (Pt instructed to stop if painful)  Flexion      max moyer* Max moyer*    Max moyer*  Extension    max moyer* Max moyer* stiffness   Max moyer*  R rotation   max moyer* Max moyer*    Mod moyer*   L rotation     mod moyer* Mod moyer    Mod moyer*  R Sidebend  Mod moyer* Max moyer*familiar R neck pain  Mod moyer*  L sidebend  Mod  moyer *  Min moyer     Mod moyer*  Retraction    Max moyer* Max moyer*    Max moyer*  Protraction  mod moyer* Min moyer     Mod moyer*    Thoracic AROM limitation      5/9/2024 6/13/2024 7/5/2024  Thoracic extension  Max*  Deferred d/t pain Max moyer*  Thoracic flexion  Max*  Deferred d/t pain Max moyer*  Thoacic rotation R  Max*  Deferred d/t pain Max moyer*  Thoracic rotation L  Mod  Deferred d/t pain Max moyer*      Assessment: Attempted to perform re-evaluation today. After assessing thoracic AROM, pt urgently travels to restroom. Pt coughing and gagging which subsides in approximately 1-2 minutes. Of note, pt does have tendency of coughing t/o sessions, however, today's coughing more intense than in previous sessions. Vitals assessed. HR between 85 to 95 bpm and /94 mmHg. Vitals improved to BP of 130/84 mmHg with rest post coughing. Pt instructed to schedule with primary care doctor before leaving facility. Pt agreeable to this. Pt instructed to monitor symptoms and present to Urgent Care next door prior to returning home if needed. Pt instructed to present to ER w/ concerns if symptoms persist/ worsen. Unable to assess UE AROM and strength today; plan to perform formal re-evaluation next session. Patient would benefit from continued PT.       Plan: Plan to perform re-evaluation next session. Continue per plan of care.  Progress treatment as tolerated.       Precautions: History reviewed. No  "pertinent past medical history.  History reviewed. No pertinent surgical history.    SOC: 5/9/2024  FOTO: 5/9/2024  POC Expiration: 8/1/2024   Daily Treatment Log  Date: 7/2/2024 7/5/2024 Next session  6/26/2024   Visit#/ auth: 11/12 No charge   10/12   Objective Measures         Mechanical assessment        Manuals         myofascial release supine                                      Neuro Re-Ed 15'    25'    Supine chin tuck 10\" x 5  10\" x 5    Seated w/ self OP 5\"x10    Supine B/L horz abd in 45 flex      5\"x10    Seated chin tuck         Supine scap retraction        supine shoulder ER AROM        Seated row Std red tubing 1 x 12    Yellow tubing 2x10     Std shoulder ext Std red tubing 1 x 12    Yellow tubing 2x10    B/L shld ER vs TB  B/L ER AROM 5\"x10     B/L ER AROM 5\"x10     stand bkwd rolls 3# 2 x 8      2# DB 15x     seated thoracic ext over MB         Ther Ex 20'    13'   Pulleys  3'    3'   Pball roll outs  5\" x 10 fwd    5\"x10 fwd/R/L     Cervical flex/ ext AROM         Cervical rotation AROM Seated 5\"x10 w/ self OP     Seated 5\"x10 w/ self OP    Cervical side bend AROM         Slouch OC         Supine flex w/ cane 5\"x10 w/ cane 5\"x10 w/ cane   5\"x10 w/ cane    SL open books 10\"x5 R/L; hand on hip  10\"x5 R/L      Corner stretch  5\" x 10    90/90 w/ cane 5\"x10     wall slides         Objective measures  Cervical/ thoracic AROM/ vitals       EDUCATION: Pathology, review of impairements, prognosis, activity modification, POC, and HEP        Ther Activity          Posture education                   Gait Training                             Modalities                             HEP:   Access Code: [email or web address]  URL: [email or web address]  Date: 05/28/2024  Prepared by: Emma Mascorro    Exercises  - Supine Chin Tuck  - 2 x daily - 1 sets - 5 reps - 10 seconds hold  - Supine Scapular Retraction  - 2 x daily - 1 sets - 10 reps - 5 seconds hold  - Head Nods  - 2 x daily - 1 sets - 10 reps  - Seated " Cervical Rotation AROM  - 2 x daily - 5 reps - 10 hold  - Sidelying Open Book  - 2 x daily - 1 sets - 5 reps - 10 hold  - Slouch overcorrect on CHAIR  - 2 x daily - 1 sets - 10 reps
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
## 2024-07-08 ENCOUNTER — APPOINTMENT (OUTPATIENT)
Dept: LAB | Facility: HOSPITAL | Age: 44
End: 2024-07-08
Payer: COMMERCIAL

## 2024-07-08 DIAGNOSIS — E61.1 IRON DEFICIENCY: Primary | ICD-10-CM

## 2024-07-08 DIAGNOSIS — E87.6 HYPOPOTASSEMIA: ICD-10-CM

## 2024-07-08 DIAGNOSIS — R42 DIZZINESS AND GIDDINESS: ICD-10-CM

## 2024-07-08 LAB
ALBUMIN SERPL BCG-MCNC: 4.3 G/DL (ref 3.5–5)
ALP SERPL-CCNC: 42 U/L (ref 34–104)
ALT SERPL W P-5'-P-CCNC: 19 U/L (ref 7–52)
ANION GAP SERPL CALCULATED.3IONS-SCNC: 8 MMOL/L (ref 4–13)
AST SERPL W P-5'-P-CCNC: 16 U/L (ref 13–39)
BILIRUB SERPL-MCNC: 0.56 MG/DL (ref 0.2–1)
BUN SERPL-MCNC: 9 MG/DL (ref 5–25)
CALCIUM SERPL-MCNC: 9.1 MG/DL (ref 8.4–10.2)
CHLORIDE SERPL-SCNC: 105 MMOL/L (ref 96–108)
CO2 SERPL-SCNC: 26 MMOL/L (ref 21–32)
CREAT SERPL-MCNC: 0.53 MG/DL (ref 0.6–1.3)
GFR SERPL CREATININE-BSD FRML MDRD: 118 ML/MIN/1.73SQ M
GLUCOSE P FAST SERPL-MCNC: 92 MG/DL (ref 65–99)
IRON SERPL-MCNC: 74 UG/DL (ref 50–212)
POTASSIUM SERPL-SCNC: 3.6 MMOL/L (ref 3.5–5.3)
PROT SERPL-MCNC: 7.2 G/DL (ref 6.4–8.4)
SODIUM SERPL-SCNC: 139 MMOL/L (ref 135–147)
TSH SERPL DL<=0.05 MIU/L-ACNC: 0.66 UIU/ML (ref 0.45–4.5)

## 2024-07-08 PROCEDURE — 80053 COMPREHEN METABOLIC PANEL: CPT

## 2024-07-08 PROCEDURE — 36415 COLL VENOUS BLD VENIPUNCTURE: CPT

## 2024-07-08 PROCEDURE — 83540 ASSAY OF IRON: CPT

## 2024-07-08 PROCEDURE — 84443 ASSAY THYROID STIM HORMONE: CPT

## 2024-07-09 ENCOUNTER — TELEPHONE (OUTPATIENT)
Dept: PHYSICAL THERAPY | Facility: CLINIC | Age: 44
End: 2024-07-09

## 2024-07-09 NOTE — TELEPHONE ENCOUNTER
Called pt to check on status due to missed appt. Asked pt to call back and confirm next appointment Friday 7/12 by 7/10 at 7:30pm.     Yenifer Richardson, PT, DPT

## 2024-07-10 ENCOUNTER — HOSPITAL ENCOUNTER (EMERGENCY)
Facility: HOSPITAL | Age: 44
Discharge: PSYCHIATRIC OR SUBSTANCE USE FACILITY | End: 2024-07-11
Attending: EMERGENCY MEDICINE
Payer: COMMERCIAL

## 2024-07-10 ENCOUNTER — APPOINTMENT (EMERGENCY)
Dept: RADIOLOGY | Facility: HOSPITAL | Age: 44
End: 2024-07-10
Payer: COMMERCIAL

## 2024-07-10 DIAGNOSIS — Z00.8 ENCOUNTER FOR PSYCHOLOGICAL EVALUATION: ICD-10-CM

## 2024-07-10 DIAGNOSIS — F22 PARANOIA (HCC): ICD-10-CM

## 2024-07-10 DIAGNOSIS — F20.0 PARANOID SCHIZOPHRENIA (HCC): Primary | ICD-10-CM

## 2024-07-10 LAB
ALBUMIN SERPL BCG-MCNC: 4.3 G/DL (ref 3.5–5)
ALP SERPL-CCNC: 39 U/L (ref 34–104)
ALT SERPL W P-5'-P-CCNC: 19 U/L (ref 7–52)
AMPHETAMINES SERPL QL SCN: NEGATIVE
ANION GAP SERPL CALCULATED.3IONS-SCNC: 11 MMOL/L (ref 4–13)
AST SERPL W P-5'-P-CCNC: 22 U/L (ref 13–39)
BARBITURATES UR QL: NEGATIVE
BASOPHILS # BLD AUTO: 0.05 THOUSANDS/ÂΜL (ref 0–0.1)
BASOPHILS NFR BLD AUTO: 1 % (ref 0–1)
BENZODIAZ UR QL: NEGATIVE
BILIRUB SERPL-MCNC: 0.54 MG/DL (ref 0.2–1)
BILIRUB UR QL STRIP: NEGATIVE
BUN SERPL-MCNC: 9 MG/DL (ref 5–25)
CALCIUM SERPL-MCNC: 9.1 MG/DL (ref 8.4–10.2)
CARDIAC TROPONIN I PNL SERPL HS: <2 NG/L
CHLORIDE SERPL-SCNC: 102 MMOL/L (ref 96–108)
CLARITY UR: CLEAR
CO2 SERPL-SCNC: 24 MMOL/L (ref 21–32)
COCAINE UR QL: NEGATIVE
COLOR UR: COLORLESS
CREAT SERPL-MCNC: 0.49 MG/DL (ref 0.6–1.3)
EOSINOPHIL # BLD AUTO: 0.03 THOUSAND/ÂΜL (ref 0–0.61)
EOSINOPHIL NFR BLD AUTO: 0 % (ref 0–6)
ERYTHROCYTE [DISTWIDTH] IN BLOOD BY AUTOMATED COUNT: 14 % (ref 11.6–15.1)
ETHANOL SERPL-MCNC: <10 MG/DL
FENTANYL UR QL SCN: NEGATIVE
GFR SERPL CREATININE-BSD FRML MDRD: 121 ML/MIN/1.73SQ M
GLUCOSE SERPL-MCNC: 103 MG/DL (ref 65–140)
GLUCOSE UR STRIP-MCNC: NEGATIVE MG/DL
HCT VFR BLD AUTO: 38.3 % (ref 34.8–46.1)
HGB BLD-MCNC: 13.2 G/DL (ref 11.5–15.4)
HGB UR QL STRIP.AUTO: NEGATIVE
HYDROCODONE UR QL SCN: NEGATIVE
IMM GRANULOCYTES # BLD AUTO: 0.02 THOUSAND/UL (ref 0–0.2)
IMM GRANULOCYTES NFR BLD AUTO: 0 % (ref 0–2)
KETONES UR STRIP-MCNC: NEGATIVE MG/DL
LEUKOCYTE ESTERASE UR QL STRIP: NEGATIVE
LYMPHOCYTES # BLD AUTO: 2.33 THOUSANDS/ÂΜL (ref 0.6–4.47)
LYMPHOCYTES NFR BLD AUTO: 24 % (ref 14–44)
MCH RBC QN AUTO: 30.7 PG (ref 26.8–34.3)
MCHC RBC AUTO-ENTMCNC: 34.5 G/DL (ref 31.4–37.4)
MCV RBC AUTO: 89 FL (ref 82–98)
METHADONE UR QL: NEGATIVE
MONOCYTES # BLD AUTO: 0.73 THOUSAND/ÂΜL (ref 0.17–1.22)
MONOCYTES NFR BLD AUTO: 7 % (ref 4–12)
NEUTROPHILS # BLD AUTO: 6.75 THOUSANDS/ÂΜL (ref 1.85–7.62)
NEUTS SEG NFR BLD AUTO: 68 % (ref 43–75)
NITRITE UR QL STRIP: NEGATIVE
NRBC BLD AUTO-RTO: 0 /100 WBCS
OPIATES UR QL SCN: NEGATIVE
OXYCODONE+OXYMORPHONE UR QL SCN: NEGATIVE
PCP UR QL: NEGATIVE
PH UR STRIP.AUTO: 6.5 [PH]
PLATELET # BLD AUTO: 279 THOUSANDS/UL (ref 149–390)
PMV BLD AUTO: 8.8 FL (ref 8.9–12.7)
POTASSIUM SERPL-SCNC: 3.1 MMOL/L (ref 3.5–5.3)
PROT SERPL-MCNC: 7 G/DL (ref 6.4–8.4)
PROT UR STRIP-MCNC: NEGATIVE MG/DL
RBC # BLD AUTO: 4.3 MILLION/UL (ref 3.81–5.12)
SODIUM SERPL-SCNC: 137 MMOL/L (ref 135–147)
SP GR UR STRIP.AUTO: <1.005 (ref 1–1.03)
THC UR QL: NEGATIVE
UROBILINOGEN UR STRIP-ACNC: <2 MG/DL
WBC # BLD AUTO: 9.91 THOUSAND/UL (ref 4.31–10.16)

## 2024-07-10 PROCEDURE — 82077 ASSAY SPEC XCP UR&BREATH IA: CPT | Performed by: EMERGENCY MEDICINE

## 2024-07-10 PROCEDURE — 71045 X-RAY EXAM CHEST 1 VIEW: CPT

## 2024-07-10 PROCEDURE — 85025 COMPLETE CBC W/AUTO DIFF WBC: CPT | Performed by: EMERGENCY MEDICINE

## 2024-07-10 PROCEDURE — 84484 ASSAY OF TROPONIN QUANT: CPT | Performed by: EMERGENCY MEDICINE

## 2024-07-10 PROCEDURE — 99284 EMERGENCY DEPT VISIT MOD MDM: CPT

## 2024-07-10 PROCEDURE — 81025 URINE PREGNANCY TEST: CPT | Performed by: EMERGENCY MEDICINE

## 2024-07-10 PROCEDURE — 36415 COLL VENOUS BLD VENIPUNCTURE: CPT | Performed by: EMERGENCY MEDICINE

## 2024-07-10 PROCEDURE — 99285 EMERGENCY DEPT VISIT HI MDM: CPT | Performed by: EMERGENCY MEDICINE

## 2024-07-10 PROCEDURE — 93005 ELECTROCARDIOGRAM TRACING: CPT

## 2024-07-10 PROCEDURE — 80307 DRUG TEST PRSMV CHEM ANLYZR: CPT | Performed by: EMERGENCY MEDICINE

## 2024-07-10 PROCEDURE — 80053 COMPREHEN METABOLIC PANEL: CPT | Performed by: EMERGENCY MEDICINE

## 2024-07-10 PROCEDURE — 81003 URINALYSIS AUTO W/O SCOPE: CPT | Performed by: EMERGENCY MEDICINE

## 2024-07-10 RX ORDER — ACETAMINOPHEN 325 MG/1
650 TABLET ORAL ONCE
Status: COMPLETED | OUTPATIENT
Start: 2024-07-10 | End: 2024-07-10

## 2024-07-10 RX ORDER — POTASSIUM CHLORIDE 20 MEQ/1
40 TABLET, EXTENDED RELEASE ORAL ONCE
Status: DISCONTINUED | OUTPATIENT
Start: 2024-07-10 | End: 2024-07-11 | Stop reason: HOSPADM

## 2024-07-10 RX ADMIN — ACETAMINOPHEN 650 MG: 325 TABLET ORAL at 20:10

## 2024-07-10 NOTE — ED NOTES
Pt had to be redirected multiple times by multiple staff members. Pt found self serving hot water. Security called to remove.     Destiny Patiño RN  07/10/24 [id number]

## 2024-07-10 NOTE — ED NOTES
The patient has been moved to . The patient began yelling and would not go into her room. The patient would not aloow the POA or security to exit the . PES and RN went into assist getting the patient back into her room so staff could exit . PES spoke with the patient when she went back in to her room with the presence of RN and security. The patient was yelling and demanding to leave . PES explained the process to her and that she will be staying in . The patient began yelling about being set up by [facility]. The patient began yelling about having chest pains and not wanting to be in that room.       EDMD made aware of the patient's chest pain.        Doris LOCKETT

## 2024-07-10 NOTE — ED NOTES
Per Ziggy in the lab, the troponin can be collected off existing specimen.     Danelle Ulrich RN  07/10/24 [id number]

## 2024-07-10 NOTE — ED NOTES
In pt belongings was an id and health immunization cards for timur cueto, pt stated is her daughter. Nurse AG notified      Cheryl Avelar  07/10/24 [id number]
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
## 2024-07-11 ENCOUNTER — TELEPHONE (OUTPATIENT)
Dept: PHYSICAL THERAPY | Facility: CLINIC | Age: 44
End: 2024-07-11

## 2024-07-11 VITALS
SYSTOLIC BLOOD PRESSURE: 141 MMHG | DIASTOLIC BLOOD PRESSURE: 94 MMHG | TEMPERATURE: 97.6 F | RESPIRATION RATE: 18 BRPM | HEART RATE: 95 BPM | OXYGEN SATURATION: 99 %

## 2024-07-11 LAB
EXT PREGNANCY TEST URINE: NEGATIVE
EXT. CONTROL: NORMAL

## 2024-07-11 RX ORDER — ACETAMINOPHEN 325 MG/1
650 TABLET ORAL ONCE
Status: COMPLETED | OUTPATIENT
Start: 2024-07-11 | End: 2024-07-11

## 2024-07-11 RX ADMIN — ACETAMINOPHEN 650 MG: 325 TABLET ORAL at 14:11

## 2024-07-11 RX ADMIN — ACETAMINOPHEN 650 MG: 325 TABLET ORAL at 17:16

## 2024-07-11 NOTE — TELEPHONE ENCOUNTER
Attempted to call pt re: her upcoming appts. After consulting w/ her PCP, we were advised to remove her upcoming appts until pt is able to follow up w/ her PCP after her current time in the hospital. Unable to reach pt or leave message but will have to remove appts.

## 2024-07-11 NOTE — ED NOTES
STEPHEN spoke with Moira at [facility] for an update. The patient has been screened and is now in que for a tele psych. Moira will let STEPHEN know when it is the patient's turn to be seen       Doris LOCKETT

## 2024-07-11 NOTE — ED NOTES
Patient is accepted at [facility]  Patient is accepted by Dr. Aureliano Quispe @ [facility]    Transportation is arranged with [employer].     Transportation is scheduled for 6:15pm by SDM  Patient may go to the floor at anytime after 6pm          Nurse report is to be called to [phone]  prior to patient transfer.        Doris LOCKETT

## 2024-07-11 NOTE — ED NOTES
The patient is standing at the observation window of  staring at the staff. The patient is demanding tylenol. The RN was made aware. The patient is stating she is having chest pains and is grabbing at her shirt and when she turns away she is is smiling and not grabbing at her shirt .  The patient is pacing in the common area of  stating she wants to be out of        Doris LOCKETT

## 2024-07-11 NOTE — ED NOTES
Received report from previous RN.  Patient here for psychiatric evaluation r/t paranoia.  Patient pending PESS screening this morning.  Patient resting quietly at this time, restless night as per report.  No acute distress noted.  Patient remains on continual observation.     Krystina Perez RN  07/11/24 07

## 2024-07-11 NOTE — EMTALA/ACUTE CARE TRANSFER
[facility] EMERGENCY DEPARTMENT  [address] [place] [id number]  Dept: [phone]      EMTALA TRANSFER CONSENT    NAME Kvng SCHMITZ 1980                              MRN [id number]    I have been informed of my rights regarding examination, treatment, and transfer   by Dr. Jac Riley MD    Benefits: Specialized equipment and/or services available at the receiving facility (Include comment)________________________    Risks: Potential for delay in receiving treatment      Transfer Request   I acknowledge that my medical condition has been evaluated and explained to me by the emergency department physician or other qualified medical person and/or my attending physician who has recommended and offered to me further medical examination and treatment. I understand the Hospital's obligation with respect to the treatment and stabilization of my emergency medical condition. I nevertheless request to be transferred. I release the Hospital, the doctor, and any other persons caring for me from all responsibility or liability for any injury or ill effects that may result from my transfer and agree to accept all responsibility for the consequences of my choice to transfer, rather than receive stabilizing treatment at the Hospital. I understand that because the transfer is my request, my insurance may not provide reimbursement for the services.  The Hospital will assist and direct me and my family in how to make arrangements for transfer, but the hospital is not liable for any fees charged by the transport service.  In spite of this understanding, I refuse to consent to further medical examination and treatment which has been offered to me, and request transfer to Accepting Facility Name, City & State : [place]. I authorize the performance of emergency medical procedures and treatments upon me in both transit and upon arrival at the receiving  facility.  Additionally, I authorize the release of any and all medical records to the receiving facility and request they be transported with me, if possible.    I authorize the performance of emergency medical procedures and treatments upon me in both transit and upon arrival at the receiving facility.  Additionally, I authorize the release of any and all medical records to the receiving facility and request they be transported with me, if possible.  I understand that the safest mode of transportation during a medical emergency is an ambulance and that the Hospital advocates the use of this mode of transport. Risks of traveling to the receiving facility by car, including absence of medical control, life sustaining equipment, such as oxygen, and medical personnel has been explained to me and I fully understand them.    (ASHLI CORRECT BOX BELOW)  [  ]  I consent to the stated transfer and to be transported by ambulance/helicopter.  [  ]  I consent to the stated transfer, but refuse transportation by ambulance and accept full responsibility for my transportation by car.  I understand the risks of non-ambulance transfers and I exonerate the Hospital and its staff from any deterioration in my condition that results from this refusal.    X___________________________________________    DATE  24  TIME________  Signature of patient or legally responsible individual signing on patient behalf           RELATIONSHIP TO PATIENT_________________________          Provider Certification    NAME Kvng Quevedo                                        [facility] 1980                              MRN [id number]    A medical screening exam was performed on the above named patient.  Based on the examination:    Condition Necessitating Transfer The primary encounter diagnosis was Paranoid schizophrenia (HCC). Diagnoses of Encounter for psychological evaluation and Paranoia (HCC) were also pertinent to this visit.    Patient Condition:  The patient has been stabilized such that within reasonable medical probability, no material deterioration of the patient condition or the condition of the unborn child(yohana) is likely to result from the transfer    Reason for Transfer: Level of Care needed not available at this facility    Transfer Requirements: Facility [facility]   Space available and qualified personnel available for treatment as acknowledged by    Agreed to accept transfer and to provide appropriate medical treatment as acknowledged by       Dr. Bedoya  Appropriate medical records of the examination and treatment of the patient are provided at the time of transfer   STAFF INITIAL WHEN COMPLETED _______  Transfer will be performed by qualified personnel from    and appropriate transfer equipment as required, including the use of necessary and appropriate life support measures.    Provider Certification: I have examined the patient and explained the following risks and benefits of being transferred/refusing transfer to the patient/family:  General risk, such as traffic hazards, adverse weather conditions, rough terrain or turbulence, possible failure of equipment (including vehicle or aircraft), or consequences of actions of persons outside the control of the transport personnel      Based on these reasonable risks and benefits to the patient and/or the unborn child(yohana), and based upon the information available at the time of the patient’s examination, I certify that the medical benefits reasonably to be expected from the provision of appropriate medical treatments at another medical facility outweigh the increasing risks, if any, to the individual’s medical condition, and in the case of labor to the unborn child, from effecting the transfer.    X____________________________________________ DATE 07/11/24        TIME_______      ORIGINAL - SEND TO MEDICAL RECORDS   COPY - SEND WITH PATIENT DURING TRANSFER
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
## 2024-07-11 NOTE — ED NOTES
STEPHEN called over to [facility] and spoke with Jose Enrique. Jose Enrique asked if the patient has been cooperative . STEPHEN stated the patient is currently sleeping. Jose Enrique stated let her sleep she will be screened in the        Doris LARSON MSW
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
## 2024-07-11 NOTE — ED CARE HANDOFF
Emergency Department Sign Out Note        Sign out and transfer of care from previous provider. See Separate Emergency Department note.     The patient, Kvng Quevedo, was evaluated by the previous provider for psychiatric evaluation.    Workup Completed:  Medically cleared    ED Course / Workup Pending (followup):  Patient is awaiting telepsych this morning                                     Procedures  Medical Decision Making  Amount and/or Complexity of Data Reviewed  Labs: ordered.  Radiology: ordered and independent interpretation performed.    Risk  OTC drugs.  Prescription drug management.  Decision regarding hospitalization.            Disposition  Final diagnoses:   Encounter for psychological evaluation   Paranoia (HCC)     Time reflects when diagnosis was documented in both MDM as applicable and the Disposition within this note       Time User Action Codes Description Comment    7/10/2024  6:21 PM Daniel Braswell Add [Z00.8] Encounter for psychological evaluation     7/10/2024  6:21 PM Daniel Braswell Add [F22] Paranoia (HCC)           ED Disposition       ED Disposition   Transfer to Behavioral Health Condition   --    Date/Time   Wed Jul 10, 2024  6:21 PM    Comment   Kvng Quevedo should be transferred out to  and has been medically cleared.               Follow-up Information    None       Patient's Medications   Discharge Prescriptions    No medications on file     No discharge procedures on file.       ED Provider  Electronically Signed by     Jac Riley MD  07/11/24 0705

## 2024-07-12 ENCOUNTER — APPOINTMENT (OUTPATIENT)
Dept: PHYSICAL THERAPY | Facility: CLINIC | Age: 44
End: 2024-07-12
Payer: COMMERCIAL

## 2024-07-12 LAB
ATRIAL RATE: 92 BPM
P AXIS: 80 DEGREES
PR INTERVAL: 160 MS
QRS AXIS: 74 DEGREES
QRSD INTERVAL: 88 MS
QT INTERVAL: 364 MS
QTC INTERVAL: 450 MS
T WAVE AXIS: 65 DEGREES
VENTRICULAR RATE: 92 BPM

## 2024-07-12 PROCEDURE — 93010 ELECTROCARDIOGRAM REPORT: CPT | Performed by: INTERNAL MEDICINE

## 2024-07-16 ENCOUNTER — APPOINTMENT (OUTPATIENT)
Dept: PHYSICAL THERAPY | Facility: CLINIC | Age: 44
End: 2024-07-16
Payer: COMMERCIAL

## 2024-07-19 ENCOUNTER — APPOINTMENT (OUTPATIENT)
Dept: PHYSICAL THERAPY | Facility: CLINIC | Age: 44
End: 2024-07-19
Payer: COMMERCIAL

## 2024-07-23 ENCOUNTER — APPOINTMENT (OUTPATIENT)
Dept: PHYSICAL THERAPY | Facility: CLINIC | Age: 44
End: 2024-07-23
Payer: COMMERCIAL

## 2024-07-26 ENCOUNTER — APPOINTMENT (OUTPATIENT)
Dept: PHYSICAL THERAPY | Facility: CLINIC | Age: 44
End: 2024-07-26
Payer: COMMERCIAL

## 2024-07-30 ENCOUNTER — APPOINTMENT (OUTPATIENT)
Dept: PHYSICAL THERAPY | Facility: CLINIC | Age: 44
End: 2024-07-30
Payer: COMMERCIAL

## 2024-08-13 ENCOUNTER — OFFICE VISIT (OUTPATIENT)
Dept: FAMILY MEDICINE CLINIC | Facility: CLINIC | Age: 44
End: 2024-08-13
Payer: COMMERCIAL

## 2024-08-13 VITALS
SYSTOLIC BLOOD PRESSURE: 132 MMHG | TEMPERATURE: 97.4 F | BODY MASS INDEX: 25.24 KG/M2 | RESPIRATION RATE: 18 BRPM | HEART RATE: 80 BPM | WEIGHT: 138 LBS | DIASTOLIC BLOOD PRESSURE: 84 MMHG

## 2024-08-13 DIAGNOSIS — R06.02 SOB (SHORTNESS OF BREATH): ICD-10-CM

## 2024-08-13 DIAGNOSIS — F41.9 ANXIETY: ICD-10-CM

## 2024-08-13 DIAGNOSIS — M79.10 MYALGIA: ICD-10-CM

## 2024-08-13 DIAGNOSIS — M54.12 CERVICAL RADICULOPATHY: ICD-10-CM

## 2024-08-13 DIAGNOSIS — F31.9 BIPOLAR 1 DISORDER (HCC): Primary | ICD-10-CM

## 2024-08-13 PROCEDURE — 99214 OFFICE O/P EST MOD 30 MIN: CPT | Performed by: NURSE PRACTITIONER

## 2024-08-13 RX ORDER — ALBUTEROL SULFATE 90 UG/1
2 AEROSOL, METERED RESPIRATORY (INHALATION) EVERY 6 HOURS PRN
Qty: 18 G | Refills: 0 | Status: SHIPPED | OUTPATIENT
Start: 2024-08-13

## 2024-08-13 RX ORDER — HYDROXYZINE PAMOATE 50 MG/1
50 CAPSULE ORAL 3 TIMES DAILY PRN
Qty: 60 CAPSULE | Refills: 1 | Status: SHIPPED | OUTPATIENT
Start: 2024-08-13

## 2024-08-13 RX ORDER — ARIPIPRAZOLE 10 MG/1
TABLET ORAL
COMMUNITY
Start: 2024-07-30 | End: 2024-08-13 | Stop reason: SDUPTHER

## 2024-08-13 RX ORDER — TROLAMINE SALICYLATE 10 G/100G
CREAM TOPICAL DAILY PRN
Qty: 85 G | Refills: 1 | Status: SHIPPED | OUTPATIENT
Start: 2024-08-13

## 2024-08-13 RX ORDER — METHOCARBAMOL 750 MG/1
750 TABLET, FILM COATED ORAL 2 TIMES DAILY PRN
Qty: 60 TABLET | Refills: 0 | Status: SHIPPED | OUTPATIENT
Start: 2024-08-13

## 2024-08-13 RX ORDER — HYDROXYZINE PAMOATE 50 MG/1
CAPSULE ORAL
COMMUNITY
Start: 2024-07-30 | End: 2024-08-13 | Stop reason: SDUPTHER

## 2024-08-13 RX ORDER — ARIPIPRAZOLE 10 MG/1
10 TABLET ORAL 2 TIMES DAILY
Qty: 60 TABLET | Refills: 0 | Status: SHIPPED | OUTPATIENT
Start: 2024-08-13

## 2024-08-13 NOTE — PROGRESS NOTES
Ambulatory Visit  Name: Kvng Quevedo      : 1980      MRN: [id number]  Encounter Provider: JOSE Matthew  Encounter Date: 2024   Encounter department: [facility]    Assessment & Plan   1. Bipolar 1 disorder (HCC)  Follow up with mental health providers. Continue outpatient program.   Agreed to refill ability for one month but then needs further refill from psych. Pt verbalized understanding.   Anticipatory guidance. Monitor.   - ARIPiprazole (ABILIFY) 10 mg tablet; Take 1 tablet (10 mg total) by mouth 2 (two) times a day  Dispense: 60 tablet; Refill: 0    2. Anxiety  Stress management. Hydroxyzine prn. Follow up with psych. Monitor.   - hydrOXYzine pamoate (VISTARIL) 50 mg capsule; Take 1 capsule (50 mg total) by mouth 3 (three) times a day as needed for itching  Dispense: 60 capsule; Refill: 1    3. Myalgia  - trolamine salicylate (ASPERCREME) 10 % cream; Apply topically daily as needed for muscle/joint pain  Dispense: 85 g; Refill: 1  - methocarbamol (ROBAXIN) 750 mg tablet; Take 1 tablet (750 mg total) by mouth 2 (two) times a day as needed for muscle spasms  Dispense: 60 tablet; Refill: 0    4. SOB (shortness of breath)  - albuterol (Ventolin HFA) 90 mcg/act inhaler; Inhale 2 puffs every 6 (six) hours as needed for shortness of breath  Dispense: 18 g; Refill: 0    5. Cervical radiculopathy  Follow up with Dr. Sellers, pain management.   - methocarbamol (ROBAXIN) 750 mg tablet; Take 1 tablet (750 mg total) by mouth 2 (two) times a day as needed for muscle spasms  Dispense: 60 tablet; Refill: 0    Patient was counseled regarding instructions for management which included: impression/diagnosis, risk/benefits of treatment options, importance of compliance with treatment, risk factor reduction, and prognosis.   I have reviewed the instructions with the patient answering all questions and patient verbalized understanding.       History of Present Illness     Here for  follow up  Was Concerned about weight loss and to review labs. Weight in office in June was 127 and now currently 138 so actually gained weight. Pt states feels was losing weight due to stress and now doing better mentally and has gained weight back.   Seen in ED on 7/10 for crisis eval/paranoid schizophrenia. Was committed for 21 days, does not recall name of facility. Has been referred to [facility]  Has follow up appts scheduled. States was actually diagnosed with Bipolar 1 disorder.   In outpatient program , 3 days per week  Had been referred to PT and pain management for multiple ongoing issues.   Has follow up appt with Dr. Sellers, pain management on 8/28. Has been using Aspercream and it has been helpful for muscle pain. Needs refill on Robaxin.   Generally feels like doing better.             Review of Systems   Constitutional:  Negative for fatigue.   Respiratory:  Positive for shortness of breath (at times, needs refill on inhaler).    Cardiovascular:  Negative for palpitations.   Gastrointestinal:  Negative for abdominal pain, diarrhea, nausea and vomiting.   Musculoskeletal:  Positive for myalgias and neck pain.   Skin:  Negative for rash and wound.   Neurological:  Negative for dizziness.   Psychiatric/Behavioral:  Positive for dysphoric mood. Negative for self-injury and suicidal ideas. The patient is nervous/anxious.        Objective     /90   Pulse 80   Temp (!) 97.4 °F (36.3 °C)   Resp 18   Wt 62.6 kg (138 lb)   LMP 07/04/2024 (Exact Date)   BMI 25.24 kg/m²     Labs 7/10/24  TSH 0.657  WBC 9.9, hct 13.2, hct 38.3  K 3.1 (supplemented in ED)    Physical Exam  Vitals reviewed.   Constitutional:       General: She is not in acute distress.     Appearance: She is not ill-appearing.   Neck:      Vascular: No carotid bruit.   Cardiovascular:      Rate and Rhythm: Normal rate and regular rhythm.   Pulmonary:      Effort: Pulmonary effort is normal. No respiratory distress.       Breath sounds: Normal breath sounds. No wheezing or rales.   Musculoskeletal:      Cervical back: Normal range of motion.      Right lower leg: No edema.      Left lower leg: No edema.   Skin:     General: Skin is warm and dry.      Coloration: Skin is not jaundiced or pale.   Neurological:      General: No focal deficit present.      Mental Status: She is alert and oriented to person, place, and time.      Cranial Nerves: No cranial nerve deficit.      Sensory: No sensory deficit.   Psychiatric:         Mood and Affect: Mood normal.         Behavior: Behavior normal.         Thought Content: Thought content normal.         Judgment: Judgment normal.       Administrative Statements

## 2024-08-13 NOTE — PATIENT INSTRUCTIONS
Follow up with mental health providers as scheduled.   Abilify has been renewed for one month as discussed BUT further refills must be obtained through mental health providers  Continue all other medications as prescribed.

## 2024-08-17 NOTE — TELEPHONE ENCOUNTER
Regarding: Black out/Fall  ----- Message from Emma LOPEZ sent at 6/14/2024 12:13 PM EDT -----  Patient fell 6/13/2024, got dizzy and when was falling down blacked out. States was out for a little bit. Is coughing with strain of blood now and has a lot of pain in tail bone especially when coughs. Did not have blood when coughing before. Also has a headache, feels weak and a lot of pain     non-distended/non-tender

## 2024-08-22 ENCOUNTER — TELEPHONE (OUTPATIENT)
Dept: PHYSICAL THERAPY | Facility: CLINIC | Age: 44
End: 2024-08-22

## 2024-08-28 ENCOUNTER — OFFICE VISIT (OUTPATIENT)
Dept: PAIN MEDICINE | Facility: CLINIC | Age: 44
End: 2024-08-28
Payer: COMMERCIAL

## 2024-08-28 ENCOUNTER — APPOINTMENT (OUTPATIENT)
Dept: RADIOLOGY | Facility: CLINIC | Age: 44
End: 2024-08-28
Payer: COMMERCIAL

## 2024-08-28 VITALS
HEART RATE: 90 BPM | HEIGHT: 62 IN | DIASTOLIC BLOOD PRESSURE: 84 MMHG | WEIGHT: 140 LBS | SYSTOLIC BLOOD PRESSURE: 127 MMHG | BODY MASS INDEX: 25.76 KG/M2

## 2024-08-28 DIAGNOSIS — M54.16 LUMBAR RADICULOPATHY: ICD-10-CM

## 2024-08-28 DIAGNOSIS — M54.12 CERVICAL RADICULOPATHY: Primary | ICD-10-CM

## 2024-08-28 DIAGNOSIS — M25.562 CHRONIC PAIN OF LEFT KNEE: ICD-10-CM

## 2024-08-28 DIAGNOSIS — M54.16 LUMBAR RADICULOPATHY: Primary | ICD-10-CM

## 2024-08-28 DIAGNOSIS — M79.10 MYALGIA: ICD-10-CM

## 2024-08-28 DIAGNOSIS — G89.29 CHRONIC PAIN OF LEFT KNEE: ICD-10-CM

## 2024-08-28 PROCEDURE — 99214 OFFICE O/P EST MOD 30 MIN: CPT | Performed by: STUDENT IN AN ORGANIZED HEALTH CARE EDUCATION/TRAINING PROGRAM

## 2024-08-28 PROCEDURE — 73562 X-RAY EXAM OF KNEE 3: CPT

## 2024-08-28 RX ORDER — NAPROXEN 500 MG/1
500 TABLET ORAL 2 TIMES DAILY PRN
Qty: 60 TABLET | Refills: 0 | Status: SHIPPED | OUTPATIENT
Start: 2024-08-28 | End: 2024-09-27

## 2024-08-28 RX ORDER — METHYLPREDNISOLONE 4 MG
TABLET, DOSE PACK ORAL
Qty: 1 EACH | Refills: 0 | Status: SHIPPED | OUTPATIENT
Start: 2024-08-28

## 2024-08-28 RX ORDER — GABAPENTIN 400 MG/1
400 CAPSULE ORAL
Qty: 30 CAPSULE | Refills: 2 | Status: SHIPPED | OUTPATIENT
Start: 2024-08-28 | End: 2024-11-26

## 2024-08-28 RX ORDER — TROLAMINE SALICYLATE 10 G/100G
CREAM TOPICAL DAILY PRN
Qty: 85 G | Refills: 1 | Status: SHIPPED | OUTPATIENT
Start: 2024-08-28

## 2024-08-28 NOTE — H&P (VIEW-ONLY)
Pain Medicine Follow-Up Note    Assessment:  1. Cervical radiculopathy    2. Chronic pain of left knee    3. Lumbar radiculopathy    4. Myalgia      Patient is a pleasant 43-year-old woman who presents as a follow-up visit after last being seen on 4/18/2024 for chronic pain syndrome, cervical radiculopathy and axial low back pain.  At that time patient was continued on gabapentin 40 mg nightly and Robaxin-750 milligrams every 6 hours as needed.  Patient was also started on a Medrol Dosepak and referral was placed for physical therapy.  Since that time patient has undergone physical therapy. She is symptoms are the same rating her pain as 8 out of 10 on numeric rating scale.  The pain is worse in the morning in the evening and the pain is constant.  The quality pain is sharp, cramping, pressure-like, shooting, pins-and-needles primarily in the axial neck radiating down her right arm. Patient is receiving benefit from gabapentin and needs a refill today.     Patient continues to complain of symptoms of cervical radiculopathy with known disc herniation at C5-C6.  Patient was hesitant to undergo cervical epidural steroid injection in the past but has to move forward with this today.  Given this we will schedule patient for C7-T1 cervical epidural steroid injection fluoroscopic guidance.  Patient counseled risk and benefits of injection therapy and like to proceed.  Additionally patient complaining of new left knee pain.  Will further evaluate with an x-ray of the left knee today.  Patient also with low back complaints.  Will place a new referral to PT for this.  Lastly for symptomatic relief we will continue gabapentin at current dosing 400 mg nightly, refill naproxen 500 mg twice daily as needed.  Plan:  Orders Placed This Encounter   Procedures   • XR knee 3 vw left non injury     Standing Status:   Future     Number of Occurrences:   1     Standing Expiration Date:   8/28/2028     Scheduling Instructions:      Bring  along any outside films relating to this procedure.           Order Specific Question:   Is the patient pregnant?     Answer:   No         New Medications Ordered This Visit   Medications   • gabapentin (NEURONTIN) 400 mg capsule     Sig: Take 1 capsule (400 mg total) by mouth daily at bedtime     Dispense:  30 capsule     Refill:  2   • naproxen (NAPROSYN) 500 mg tablet     Sig: Take 1 tablet (500 mg total) by mouth 2 (two) times a day as needed for mild pain or moderate pain     Dispense:  60 tablet     Refill:  0   • methylPREDNISolone 4 MG tablet therapy pack     Sig: Use as directed on package     Dispense:  1 each     Refill:  0   • trolamine salicylate (ASPERCREME) 10 % cream     Sig: Apply topically daily as needed for muscle/joint pain     Dispense:  85 g     Refill:  1         My impressions and treatment recommendations were discussed in detail with the patient who verbalized understanding and had no further questions.        Complete risks and benefits including bleeding, infection, tissue reaction, nerve injury and allergic reaction were discussed. The approach was demonstrated using models and literature was provided. Verbal and written consent was obtained.      Follow-up is planned in six weeks time or sooner as warranted.  Discharge instructions were provided. I personally saw and examined the patient and I agree with the above discussed plan of care.    History of Present Illness:    Kvng Quevedo is a 43 y.o. female who presents to [facility] for interval re-evaluation of the above stated pain complaints. The patient has a past medical and chronic pain history as outlined in the assessment section. She was last seen on 4/18/2024.    Patient is a pleasant 43-year-old woman who presents as a follow-up visit after last being seen on 4/18/2024 for chronic pain syndrome, cervical radiculopathy and axial low back pain.  At that time patient was continued on gabapentin 40 mg  nightly and Robaxin-750 milligrams every 6 hours as needed.  Patient was also started on a Medrol Dosepak and referral was placed for physical therapy.  Since that time patient has undergone physical therapy. She is symptoms are the same rating her pain as 8 out of 10 on numeric rating scale.  The pain is worse in the morning in the evening and the pain is constant.  The quality pain is sharp, cramping, pressure-like, shooting, pins-and-needles primarily in the axial neck radiating down her right arm. Patient is receiving benefit from gabapentin and needs a refill today.       Other than as stated above, the patient denies any interval changes in medications, medical condition, mental condition, symptoms, or allergies since the last office visit.         Review of Systems:    Review of Systems   Constitutional:  Negative for unexpected weight change.   HENT:  Negative for ear pain.    Eyes:  Negative for visual disturbance.   Respiratory:  Negative for shortness of breath and wheezing.    Cardiovascular:  Positive for chest pain.   Gastrointestinal:  Negative for abdominal pain.   Musculoskeletal:  Positive for back pain, gait problem, joint swelling, neck pain and neck stiffness.        Decreased ROM, joint pain, muscle pain, joint stiffness, swelling in hands   Neurological:  Positive for dizziness, weakness, light-headedness, numbness and headaches.   Psychiatric/Behavioral:  Positive for dysphoric mood and sleep disturbance. Negative for decreased concentration. The patient is nervous/anxious.          History reviewed. No pertinent past medical history.    History reviewed. No pertinent surgical history.    Family History   Problem Relation Age of Onset   • Diabetes Mother        Social History     Occupational History   • Not on file   Tobacco Use   • Smoking status: Former     Current packs/day: 0.00     Types: Cigarettes     Quit date: 6/21/2021     Years since quitting: 3.1     Passive exposure: Never   •  "Smokeless tobacco: Never   • Tobacco comments:     Pt stated she was a social smoker    Vaping Use   • Vaping status: Never Used   Substance and Sexual Activity   • Alcohol use: Never   • Drug use: Never   • Sexual activity: Not Currently         Current Outpatient Medications:   •  albuterol (Ventolin HFA) 90 mcg/act inhaler, Inhale 2 puffs every 6 (six) hours as needed for shortness of breath, Disp: 18 g, Rfl: 0  •  ARIPiprazole (ABILIFY) 10 mg tablet, Take 1 tablet (10 mg total) by mouth 2 (two) times a day, Disp: 60 tablet, Rfl: 0  •  ferrous sulfate 324 (65 Fe) mg, Take 1 tablet (324 mg total) by mouth daily before breakfast, Disp: 90 tablet, Rfl: 3  •  gabapentin (NEURONTIN) 400 mg capsule, Take 1 capsule (400 mg total) by mouth daily at bedtime, Disp: 30 capsule, Rfl: 2  •  hydrOXYzine pamoate (VISTARIL) 50 mg capsule, Take 1 capsule (50 mg total) by mouth 3 (three) times a day as needed for itching, Disp: 60 capsule, Rfl: 1  •  meloxicam (MOBIC) 15 mg tablet, Take 1 tablet (15 mg total) by mouth daily, Disp: 30 tablet, Rfl: 1  •  methocarbamol (ROBAXIN) 750 mg tablet, Take 1 tablet (750 mg total) by mouth 2 (two) times a day as needed for muscle spasms, Disp: 60 tablet, Rfl: 0  •  methylPREDNISolone 4 MG tablet therapy pack, Use as directed on package, Disp: 1 each, Rfl: 0  •  naproxen (NAPROSYN) 500 mg tablet, Take 1 tablet (500 mg total) by mouth 2 (two) times a day as needed for mild pain or moderate pain, Disp: 60 tablet, Rfl: 0  •  Prenatal Vit-Fe Fumarate-FA (Prenatal Vitamin) 27-0.8 MG TABS, Take 1 tablet by mouth in the morning, Disp: 90 tablet, Rfl: 3  •  trolamine salicylate (ASPERCREME) 10 % cream, Apply topically daily as needed for muscle/joint pain, Disp: 85 g, Rfl: 1    No Known Allergies    Physical Exam:    /84   Pulse 90   Ht 5' 2\" (1.575 m)   Wt 63.5 kg (140 lb)   LMP 07/04/2024 (Exact Date)   BMI 25.61 kg/m²     Constitutional:normal, well developed, well nourished, alert, in " no distress and non-toxic and no overt pain behavior.  Eyes:anicteric  HEENT:grossly intact  Neck:supple, symmetric, trachea midline and no masses   Pulmonary:even and unlabored  Cardiovascular:No edema or pitting edema present  Skin:Normal without rashes or lesions and well hydrated  Psychiatric:Mood and affect appropriate  Neurologic:Cranial Nerves II-XII grossly intact  Musculoskeletal:normal gait. 4/5  strength bilaterally. TTP in cervical and lumbar spine. Pain with flexion, extension of cervical spine.       Imaging  MRI CERVICAL SPINE WITHOUT CONTRAST     INDICATION: M54.12: Radiculopathy, cervical region.     COMPARISON:  None.     TECHNIQUE:  Multiplanar, multisequence imaging of the cervical spine was performed. .        IMAGE QUALITY:  Diagnostic     FINDINGS:     ALIGNMENT: Reversal of the normal cervical lordosis.     MARROW SIGNAL:  Normal marrow signal is identified within the visualized bony structures.  No discrete marrow lesion.     CERVICAL AND VISUALIZED THORACIC CORD:  Normal signal within the visualized cord.     PREVERTEBRAL AND PARASPINAL SOFT TISSUES:  Normal.     VISUALIZED POSTERIOR FOSSA:  The visualized posterior fossa demonstrates no abnormal signal.     CERVICAL DISC SPACES:     C2-C3:  Normal.     C3-C4:  Normal.     C4-C5: Disc osteophyte complex causing anterior impression on the thecal sac.     C5-C6: Disc osteophyte complex causing anterior impression on the thecal sac and flattening of the anterior cord minimal spinal canal stenosis. Disc osteophyte complex and uncovertebral hypertrophy causing moderate to severe right neuroforaminal   narrowing.     C6-C7:  Normal.     C7-T1:  Normal.     UPPER THORACIC DISC SPACES:  Normal.     OTHER FINDINGS:  None.     IMPRESSION:     Multilevel degenerative changes most prominent at C5-C6  No orders to display         Orders Placed This Encounter   Procedures   • XR knee 3 vw left non injury

## 2024-09-03 ENCOUNTER — TELEPHONE (OUTPATIENT)
Dept: PAIN MEDICINE | Facility: CLINIC | Age: 44
End: 2024-09-03

## 2024-09-04 NOTE — PROGRESS NOTES
Pain Medicine Follow-Up Note    Assessment:  1. Cervical radiculopathy    2. Chronic pain of left knee    3. Lumbar radiculopathy    4. Myalgia      Patient is a pleasant 43-year-old woman who presents as a follow-up visit after last being seen on 4/18/2024 for chronic pain syndrome, cervical radiculopathy and axial low back pain.  At that time patient was continued on gabapentin 40 mg nightly and Robaxin-750 milligrams every 6 hours as needed.  Patient was also started on a Medrol Dosepak and referral was placed for physical therapy.  Since that time patient has undergone physical therapy. She is symptoms are the same rating her pain as 8 out of 10 on numeric rating scale.  The pain is worse in the morning in the evening and the pain is constant.  The quality pain is sharp, cramping, pressure-like, shooting, pins-and-needles primarily in the axial neck radiating down her right arm. Patient is receiving benefit from gabapentin and needs a refill today.     Patient continues to complain of symptoms of cervical radiculopathy with known disc herniation at C5-C6.  Patient was hesitant to undergo cervical epidural steroid injection in the past but has to move forward with this today.  Given this we will schedule patient for C7-T1 cervical epidural steroid injection fluoroscopic guidance.  Patient counseled risk and benefits of injection therapy and like to proceed.  Additionally patient complaining of new left knee pain.  Will further evaluate with an x-ray of the left knee today.  Patient also with low back complaints.  Will place a new referral to PT for this.  Lastly for symptomatic relief we will continue gabapentin at current dosing 400 mg nightly, refill naproxen 500 mg twice daily as needed.  Plan:  Orders Placed This Encounter   Procedures   • XR knee 3 vw left non injury     Standing Status:   Future     Number of Occurrences:   1     Standing Expiration Date:   8/28/2028     Scheduling Instructions:      Bring  Spoke with patient's daughter Samina (verbal release on file) and informed of Dr. Pérez's message below. Daughter verbalized understanding.    along any outside films relating to this procedure.           Order Specific Question:   Is the patient pregnant?     Answer:   No         New Medications Ordered This Visit   Medications   • gabapentin (NEURONTIN) 400 mg capsule     Sig: Take 1 capsule (400 mg total) by mouth daily at bedtime     Dispense:  30 capsule     Refill:  2   • naproxen (NAPROSYN) 500 mg tablet     Sig: Take 1 tablet (500 mg total) by mouth 2 (two) times a day as needed for mild pain or moderate pain     Dispense:  60 tablet     Refill:  0   • methylPREDNISolone 4 MG tablet therapy pack     Sig: Use as directed on package     Dispense:  1 each     Refill:  0   • trolamine salicylate (ASPERCREME) 10 % cream     Sig: Apply topically daily as needed for muscle/joint pain     Dispense:  85 g     Refill:  1         My impressions and treatment recommendations were discussed in detail with the patient who verbalized understanding and had no further questions.        Complete risks and benefits including bleeding, infection, tissue reaction, nerve injury and allergic reaction were discussed. The approach was demonstrated using models and literature was provided. Verbal and written consent was obtained.      Follow-up is planned in six weeks time or sooner as warranted.  Discharge instructions were provided. I personally saw and examined the patient and I agree with the above discussed plan of care.    History of Present Illness:    Kvng Quevedo is a 43 y.o. female who presents to [facility] for interval re-evaluation of the above stated pain complaints. The patient has a past medical and chronic pain history as outlined in the assessment section. She was last seen on 4/18/2024.    Patient is a pleasant 43-year-old woman who presents as a follow-up visit after last being seen on 4/18/2024 for chronic pain syndrome, cervical radiculopathy and axial low back pain.  At that time patient was continued on gabapentin 40 mg  nightly and Robaxin-750 milligrams every 6 hours as needed.  Patient was also started on a Medrol Dosepak and referral was placed for physical therapy.  Since that time patient has undergone physical therapy. She is symptoms are the same rating her pain as 8 out of 10 on numeric rating scale.  The pain is worse in the morning in the evening and the pain is constant.  The quality pain is sharp, cramping, pressure-like, shooting, pins-and-needles primarily in the axial neck radiating down her right arm. Patient is receiving benefit from gabapentin and needs a refill today.       Other than as stated above, the patient denies any interval changes in medications, medical condition, mental condition, symptoms, or allergies since the last office visit.         Review of Systems:    Review of Systems   Constitutional:  Negative for unexpected weight change.   HENT:  Negative for ear pain.    Eyes:  Negative for visual disturbance.   Respiratory:  Negative for shortness of breath and wheezing.    Cardiovascular:  Positive for chest pain.   Gastrointestinal:  Negative for abdominal pain.   Musculoskeletal:  Positive for back pain, gait problem, joint swelling, neck pain and neck stiffness.        Decreased ROM, joint pain, muscle pain, joint stiffness, swelling in hands   Neurological:  Positive for dizziness, weakness, light-headedness, numbness and headaches.   Psychiatric/Behavioral:  Positive for dysphoric mood and sleep disturbance. Negative for decreased concentration. The patient is nervous/anxious.          History reviewed. No pertinent past medical history.    History reviewed. No pertinent surgical history.    Family History   Problem Relation Age of Onset   • Diabetes Mother        Social History     Occupational History   • Not on file   Tobacco Use   • Smoking status: Former     Current packs/day: 0.00     Types: Cigarettes     Quit date: 6/21/2021     Years since quitting: 3.1     Passive exposure: Never   •  "Smokeless tobacco: Never   • Tobacco comments:     Pt stated she was a social smoker    Vaping Use   • Vaping status: Never Used   Substance and Sexual Activity   • Alcohol use: Never   • Drug use: Never   • Sexual activity: Not Currently         Current Outpatient Medications:   •  albuterol (Ventolin HFA) 90 mcg/act inhaler, Inhale 2 puffs every 6 (six) hours as needed for shortness of breath, Disp: 18 g, Rfl: 0  •  ARIPiprazole (ABILIFY) 10 mg tablet, Take 1 tablet (10 mg total) by mouth 2 (two) times a day, Disp: 60 tablet, Rfl: 0  •  ferrous sulfate 324 (65 Fe) mg, Take 1 tablet (324 mg total) by mouth daily before breakfast, Disp: 90 tablet, Rfl: 3  •  gabapentin (NEURONTIN) 400 mg capsule, Take 1 capsule (400 mg total) by mouth daily at bedtime, Disp: 30 capsule, Rfl: 2  •  hydrOXYzine pamoate (VISTARIL) 50 mg capsule, Take 1 capsule (50 mg total) by mouth 3 (three) times a day as needed for itching, Disp: 60 capsule, Rfl: 1  •  meloxicam (MOBIC) 15 mg tablet, Take 1 tablet (15 mg total) by mouth daily, Disp: 30 tablet, Rfl: 1  •  methocarbamol (ROBAXIN) 750 mg tablet, Take 1 tablet (750 mg total) by mouth 2 (two) times a day as needed for muscle spasms, Disp: 60 tablet, Rfl: 0  •  methylPREDNISolone 4 MG tablet therapy pack, Use as directed on package, Disp: 1 each, Rfl: 0  •  naproxen (NAPROSYN) 500 mg tablet, Take 1 tablet (500 mg total) by mouth 2 (two) times a day as needed for mild pain or moderate pain, Disp: 60 tablet, Rfl: 0  •  Prenatal Vit-Fe Fumarate-FA (Prenatal Vitamin) 27-0.8 MG TABS, Take 1 tablet by mouth in the morning, Disp: 90 tablet, Rfl: 3  •  trolamine salicylate (ASPERCREME) 10 % cream, Apply topically daily as needed for muscle/joint pain, Disp: 85 g, Rfl: 1    No Known Allergies    Physical Exam:    /84   Pulse 90   Ht 5' 2\" (1.575 m)   Wt 63.5 kg (140 lb)   LMP 07/04/2024 (Exact Date)   BMI 25.61 kg/m²     Constitutional:normal, well developed, well nourished, alert, in " no distress and non-toxic and no overt pain behavior.  Eyes:anicteric  HEENT:grossly intact  Neck:supple, symmetric, trachea midline and no masses   Pulmonary:even and unlabored  Cardiovascular:No edema or pitting edema present  Skin:Normal without rashes or lesions and well hydrated  Psychiatric:Mood and affect appropriate  Neurologic:Cranial Nerves II-XII grossly intact  Musculoskeletal:normal gait. 4/5  strength bilaterally. TTP in cervical and lumbar spine. Pain with flexion, extension of cervical spine.       Imaging  MRI CERVICAL SPINE WITHOUT CONTRAST     INDICATION: M54.12: Radiculopathy, cervical region.     COMPARISON:  None.     TECHNIQUE:  Multiplanar, multisequence imaging of the cervical spine was performed. .        IMAGE QUALITY:  Diagnostic     FINDINGS:     ALIGNMENT: Reversal of the normal cervical lordosis.     MARROW SIGNAL:  Normal marrow signal is identified within the visualized bony structures.  No discrete marrow lesion.     CERVICAL AND VISUALIZED THORACIC CORD:  Normal signal within the visualized cord.     PREVERTEBRAL AND PARASPINAL SOFT TISSUES:  Normal.     VISUALIZED POSTERIOR FOSSA:  The visualized posterior fossa demonstrates no abnormal signal.     CERVICAL DISC SPACES:     C2-C3:  Normal.     C3-C4:  Normal.     C4-C5: Disc osteophyte complex causing anterior impression on the thecal sac.     C5-C6: Disc osteophyte complex causing anterior impression on the thecal sac and flattening of the anterior cord minimal spinal canal stenosis. Disc osteophyte complex and uncovertebral hypertrophy causing moderate to severe right neuroforaminal   narrowing.     C6-C7:  Normal.     C7-T1:  Normal.     UPPER THORACIC DISC SPACES:  Normal.     OTHER FINDINGS:  None.     IMPRESSION:     Multilevel degenerative changes most prominent at C5-C6  No orders to display         Orders Placed This Encounter   Procedures   • XR knee 3 vw left non injury

## 2024-09-05 ENCOUNTER — OFFICE VISIT (OUTPATIENT)
Dept: OTOLARYNGOLOGY | Facility: CLINIC | Age: 44
End: 2024-09-05
Payer: COMMERCIAL

## 2024-09-05 VITALS
WEIGHT: 140 LBS | OXYGEN SATURATION: 99 % | HEART RATE: 68 BPM | HEIGHT: 62 IN | BODY MASS INDEX: 25.76 KG/M2 | TEMPERATURE: 97.5 F

## 2024-09-05 DIAGNOSIS — J01.00 ACUTE NON-RECURRENT MAXILLARY SINUSITIS: ICD-10-CM

## 2024-09-05 DIAGNOSIS — Z87.898 HISTORY OF EPISTAXIS: Primary | ICD-10-CM

## 2024-09-05 DIAGNOSIS — R42 DIZZINESS: ICD-10-CM

## 2024-09-05 PROCEDURE — 31231 NASAL ENDOSCOPY DX: CPT | Performed by: OTOLARYNGOLOGY

## 2024-09-05 PROCEDURE — 99204 OFFICE O/P NEW MOD 45 MIN: CPT | Performed by: OTOLARYNGOLOGY

## 2024-09-05 NOTE — PROGRESS NOTES
"Assessment/Plan:  Unremarkable ENT exam.  No sign of sinusitis, nor bleeding source.  F/u PRN.      Diagnosis ICD-10-CM Associated Orders   1. History of epistaxis  Z87.898       2. Acute non-recurrent maxillary sinusitis  J01.00 Ambulatory Referral to Otolaryngology      3. Dizziness  R42 Ambulatory Referral to Otolaryngology             Subjective:      Patient ID: Kvng Quevedo is a 43 y.o. female.    Pt had a nosebleed 2 months ago.        The following portions of the patient's history were reviewed and updated as appropriate: allergies, current medications, past family history, past medical history, past social history, past surgical history and problem list.    Review of Systems      Objective:      Pulse 68   Temp 97.5 °F (36.4 °C) (Temporal)   Ht 5' 2\" (1.575 m)   Wt 63.5 kg (140 lb)   LMP 07/04/2024 (Exact Date)   SpO2 99%   BMI 25.61 kg/m²          Physical Exam  Constitutional:       Appearance: She is well-developed.   HENT:      Head: Normocephalic and atraumatic.      Right Ear: Tympanic membrane, ear canal and external ear normal. No drainage. No middle ear effusion.      Left Ear: Tympanic membrane, ear canal and external ear normal. No drainage.  No middle ear effusion.      Nose: Nose normal.      Mouth/Throat:      Mouth: Oropharynx is clear and moist and mucous membranes are normal.      Pharynx: Uvula midline. No oropharyngeal exudate.      Tonsils: 2+ on the right. 2+ on the left.   Neck:      Thyroid: No thyroid mass or thyromegaly.      Trachea: Trachea normal. No tracheal deviation.   Lymphadenopathy:      Cervical: No cervical adenopathy.   Neurological:      Mental Status: She is alert.         Flexible Fiberoptic Nasal Endoscopy Procedure Note:  Indication:  epistaxis  Verbal consent obtained.  Surgeon: Mason Cruz MD  Anesthesia: 4% lidocaine, oxymetazoline  Scope passed through nasal cavities bilaterally.  Nasopharynx: normal  Right Nasal Cavity:   Mucosa: normal   Secretions: " clear  Left Nasal Cavity:   Mucosa: normal   Secretions: clear  Other findings = DNS  Patient tolerated procedure well without complications

## 2024-09-17 ENCOUNTER — EVALUATION (OUTPATIENT)
Dept: PHYSICAL THERAPY | Facility: CLINIC | Age: 44
End: 2024-09-17
Payer: COMMERCIAL

## 2024-09-17 DIAGNOSIS — M54.16 LUMBAR RADICULOPATHY: Primary | ICD-10-CM

## 2024-09-17 PROCEDURE — 97164 PT RE-EVAL EST PLAN CARE: CPT

## 2024-09-17 PROCEDURE — 97110 THERAPEUTIC EXERCISES: CPT
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
## 2024-09-19 ENCOUNTER — OFFICE VISIT (OUTPATIENT)
Dept: PHYSICAL THERAPY | Facility: CLINIC | Age: 44
End: 2024-09-19
Payer: COMMERCIAL

## 2024-09-19 DIAGNOSIS — M54.16 LUMBAR RADICULOPATHY: Primary | ICD-10-CM

## 2024-09-19 PROCEDURE — 97110 THERAPEUTIC EXERCISES: CPT

## 2024-09-19 NOTE — PROGRESS NOTES
"Daily Note     Today's date: 2024  Patient name: Kvng Quevedo  : 1980  MRN: [id number]  Referring provider: Marcos Sellers MD  Dx:   Encounter Diagnosis     ICD-10-CM    1. Lumbar radiculopathy  M54.16                      Subjective: Pt states that she is \"so-so\". She is happy with focusing on lower body at this time and seeing how she responds to cervical spine injection tomorrow. She states that she realizes that she did have a lot of trauma on her lower body in the past including being dropped off a stretcher and dragged down the stairs years ago and falling months ago.      Objective: See treatment diary below    Function  2024:   5xSTS = 31 seconds, BUE assist on thigh     Flexibility   2024   Piriformis R max moyer, L max moyer    Mechanical Assessment  1014: pretest symptoms include low back pain (7/10), L knee pain (5/10), R foot pain (7/10)   Repeated extension in standing (REI) w/ hands on hips 1 x 10 = decrease, better (LBP 5/10; NE on foot/knee)  2024: pretest symptoms include low back pain (7/10) and R leg burning pain to foot  (7/10)   BRIANA x 2 min = decrease, minimally better (low back pain (7/10) and R leg burning pain to foot (6/10))   REIL (ROM limited) 1 x 10 =  decrease, better (low back pain (5/10) and R leg burning pain to foot (5/10)) (increased pain into chest/ BUE due to weight-bearing into BUE)   REI at counter 1 x 10 = NE (low back pain (5/10) and R leg burning pain to foot (5/10))   RFIS 1 x 10 = NE (low back pain (5/10) and R leg burning pain to foot (5/10))    Palpation: Pt requests palpation to L lumbar spine just proximal to L PSIS; palpable soft tissue tension compared to contralateral side consistent with potential myofascial trigger point.     Assessment: Pt responding best to repeated extension in lying; however this did cause increase upper quarter pain; of note pt has pmhx of pain along cervical spine and pectoralis region. Updated HEP; added " REI for stretch into lumbar extension similar to REIL with less stress on UE. Pt reminded of concept of peripheralization and centralization; instructed to discontinue the exercise if pain increases significantly into low back or travels/increases distally. Tolerated treatment well. Patient would benefit from continued PT.       Plan: Continue per plan of care.  Progress treatment as tolerated.       Precautions:   Hx of Bipolar Disorder and Anxiety  Pt scheduled for C7-T1 CERVICAL EPIDURAL STEROID INJECTION (Spine Cervical) 9/20/2024    No past medical history on file.  No past surgical history on file.    SOC: 9/17/2024  FOTO: 9/17/2024  POC Expiration: 12/10/2024  Daily Treatment Log  Date: Initial Evaluation 9/19/2924  Next session       Visit#/ auth: 1  2         Objective Measures             flexibility  Assessed, see objective      mechanical assessment    performed, see objective         5xSTS  Performed, see objective      Manuals                                                                     Neuro Re-Ed              bridge              supine clam              side step              pallof press              shoulder extension                                         Ther Ex  15'  40'         Supine NG  1 x 5 R/L w/ sos      Supine LE up contralateral shin  1 x 5 R/L w/ sos      Supine DKTC      w/ caution       REI w/ hands at hips             BRIANA w/ knee flex              stand hip ext/ abd              HR/TR                           Objective measures   mech assessment, flexibility, 5xSTS         EDUCATION: Pathology, review of impairements, prognosis, activity modification, POC, and HEP KE + Pt was educated in spine anatomy, concept of peripheralization vs centralization, activity modification, posture, and use of lumbar roll.    HEP updated and reviewed         Ther Activity              STS      high mat                     Gait Training                                         Modalities                                          HEP:   9/17/2024:     Access Code: [email or web address]  URL: [email or web address]  Date: 09/19/2024  Prepared by: Yenifer Richardson    Exercises  - Standing Lumbar Extension with Counter  - 4-5 x daily - 1 sets - 10 reps

## 2024-09-20 ENCOUNTER — APPOINTMENT (OUTPATIENT)
Dept: RADIOLOGY | Facility: HOSPITAL | Age: 44
End: 2024-09-20
Payer: COMMERCIAL

## 2024-09-20 ENCOUNTER — HOSPITAL ENCOUNTER (OUTPATIENT)
Facility: AMBULARY SURGERY CENTER | Age: 44
Setting detail: OUTPATIENT SURGERY
Discharge: HOME | End: 2024-09-20
Attending: STUDENT IN AN ORGANIZED HEALTH CARE EDUCATION/TRAINING PROGRAM | Admitting: STUDENT IN AN ORGANIZED HEALTH CARE EDUCATION/TRAINING PROGRAM
Payer: COMMERCIAL

## 2024-09-20 VITALS
DIASTOLIC BLOOD PRESSURE: 79 MMHG | TEMPERATURE: 97.3 F | SYSTOLIC BLOOD PRESSURE: 127 MMHG | RESPIRATION RATE: 18 BRPM | HEART RATE: 76 BPM | OXYGEN SATURATION: 100 %

## 2024-09-20 PROCEDURE — NC001 PR NO CHARGE: Performed by: STUDENT IN AN ORGANIZED HEALTH CARE EDUCATION/TRAINING PROGRAM

## 2024-09-20 PROCEDURE — 62321 NJX INTERLAMINAR CRV/THRC: CPT | Performed by: STUDENT IN AN ORGANIZED HEALTH CARE EDUCATION/TRAINING PROGRAM

## 2024-09-20 RX ORDER — LIDOCAINE HYDROCHLORIDE 10 MG/ML
INJECTION, SOLUTION EPIDURAL; INFILTRATION; INTRACAUDAL; PERINEURAL AS NEEDED
Status: DISCONTINUED | OUTPATIENT
Start: 2024-09-20 | End: 2024-09-20 | Stop reason: HOSPADM

## 2024-09-20 RX ORDER — METHYLPREDNISOLONE ACETATE 80 MG/ML
INJECTION, SUSPENSION INTRA-ARTICULAR; INTRALESIONAL; INTRAMUSCULAR; SOFT TISSUE AS NEEDED
Status: DISCONTINUED | OUTPATIENT
Start: 2024-09-20 | End: 2024-09-20 | Stop reason: HOSPADM

## 2024-09-20 NOTE — OP NOTE
Pre-procedure Diagnosis: Cervical Radiculopathy  Post-procedure Diagnosis: Cervical radiculopathy  Procedure Title(s):  1. C7-T1 interlaminar epidural steroid injection      2. Intraoperative fluoroscopy  Attending Surgeon:   Marcos Sellers MD  Anesthesia:   Local     Indications: The patient is a 43 y.o. year-old female with a diagnosis of Cervical radiculopathy. The patient's history and physical exam were reviewed. The risks, benefits and alternatives to the procedure were discussed, and all questions were answered to the patient's satisfaction. The patient agreed to proceed, and written informed consent was obtained.    Procedure in Detail: The patient was brought into the procedure room and placed in the prone position on the fluoroscopy table. The area of the cervical spine was prepped with chlorhexidine gluconate solution times one and draped in a sterile manner.    The C7-T1 interspace was identified and marked under AP fluoroscopy. The skin and subcutaneous tissues in the area were anesthetized with 1% lidocaine. A 20-gauge Tuohy epidural needle was directed toward the interspace under fluoroscopic guidance until the ligamentum flavum was engaged. The C-arm was oblique to the right to obtain a contra-lateral oblique view.  From this point, a loss of resistance technique with air was used to identify entrance of the needle into the epidural space. Once an appropriate loss was obtained, negative aspiration was confirmed, and 1 ml Omnipaque 300 contrast solution was injected. An appropriate epidurogram was noted.    Then, after negative aspiration, a solution consisting of 1-mL depomedrol(40mg/ml) and 2-mL preservative-free saline was easily injected. The needle was removed with a 1% lidocaine flush. The patient's back was cleaned and a bandage was placed over the site of needle insertion.    Disposition: The patient tolerated the procedure well, and there were no apparent complications. The patient was  taken to the recovery area where written discharge instructions for the procedure were given.     Estimated Blood Loss: None  Specimens Obtained: N/A

## 2024-09-20 NOTE — INTERVAL H&P NOTE
H&P reviewed. After examining the patient I find no changes in the patients condition since the H&P had been written.    Vitals:    09/20/24 0924   BP: 129/86   Pulse: 85   Resp: 18   Temp: (!) 97.3 °F (36.3 °C)   SpO2: 100%

## 2024-09-20 NOTE — DISCHARGE INSTRUCTIONS
Epidural Steroid Injection   WHAT YOU NEED TO KNOW:   An epidural steroid injection (GUERRERO) is a procedure to inject steroid medicine into the epidural space. The epidural space is between your spinal cord and vertebrae. Steroids reduce inflammation and fluid buildup in your spine that may be causing pain. You may be given pain medicine along with the steroids.          ACTIVITY  Do not drive or operate machinery today.  No strenuous activity today - bending, lifting, etc.  You may resume normal activites starting tomorrow - start slowly and as tolerated.  You may shower today, but no tub baths or hot tubs.  You may have numbness for several hours from the local anesthetic. Please use caution and common sense, especially with weight-bearing activities.    CARE OF THE INJECTION SITE  If you have soreness or pain, apply ice to the area today (20 minutes on/20 minutes off).  Starting tomorrow, you may use warm, moist heat or ice if needed.  You may have an increase or change in your discomfort for 36-48 hours after your treatment.  Apply ice and continue with any pain medication you have been prescribed.  Notify the [facility] if you have any of the following: redness, drainage, swelling, headache, stiff neck or fever above 100°F.    SPECIAL INSTRUCTIONS  Our office will contact you in approximately 14 days for a progress report.    MEDICATIONS  Continue to take all routine medications.  Our office may have instructed you to hold some medications.    As no general anesthesia was used in today's procedure, you should not experience any side effects related to anesthesia.     If you are diabetic, the steroids used in today's injection may temporarily increase your blood sugar levels after the first few days after your injection. Please keep a close eye on your sugars and alert the doctor who manages your diabetes if your sugars are significantly high from your baseline or you are symptomatic.     If you have a  problem specifically related to your procedure, please call our office at [phone].  Problems not related to your procedure should be directed to your primary care physician.

## 2024-09-23 ENCOUNTER — OFFICE VISIT (OUTPATIENT)
Dept: PHYSICAL THERAPY | Facility: CLINIC | Age: 44
End: 2024-09-23
Payer: COMMERCIAL

## 2024-09-23 DIAGNOSIS — M54.16 LUMBAR RADICULOPATHY: Primary | ICD-10-CM

## 2024-09-23 PROCEDURE — 97110 THERAPEUTIC EXERCISES: CPT

## 2024-09-23 PROCEDURE — 97112 NEUROMUSCULAR REEDUCATION: CPT

## 2024-09-23 NOTE — PROGRESS NOTES
Daily Note     Today's date: 2024  Patient name: Kvng Quevedo  : 1980  MRN: [id number]  Referring provider: Marcos Sellers MD  Dx:   Encounter Diagnosis     ICD-10-CM    1. Lumbar radiculopathy  M54.16                      Subjective: Pt states that she didn't do much of her stretches over the weekend because she recently had cervical spine injection. Pt underwent C7-T1 cervical epidural steroid injection 2024; states that she has been taking her time with turning to limit neck pain. She does report relief following injection. She states that she did do the stretches that were provided last session this morning which helps to relax the muscles. States that she has been practicing the nerve glide.       Objective: See treatment diary below      Assessment: Performed core stabilizing and extension biased exercises in session today due to pt report of improvement in symptoms w/ REI stretch. Provided frequent check ins. No complaints of increased pain offered in session today. Pt reports LE fatigue toward end of session. Educated pt that as she continues to perform exercises her muscular endurance/ activity tolerance should improve. Pt instructed to resume HEP as tolerable; instructed to discontinue if symptoms peripheralize or increase significantly. Tolerated treatment well. Patient would benefit from continued PT.       Plan: Continue per plan of care.  Progress treatment as tolerated.       Precautions:   Weakness of BLE; provide CS w/ ambulation in clinic due to impaired balance  Hx of Bipolar Disorder and Anxiety  Pt scheduled for C7-T1 CERVICAL EPIDURAL STEROID INJECTION (Spine Cervical) 2024    No past medical history on file.  No past surgical history on file.    SOC: 2024  FOTO: 2024  POC Expiration: 12/10/2024  Daily Treatment Log  Date: Initial Evaluation 2924       Visit#/ auth: 1  2  3       Objective Measures             flexibility  Assessed, see  "objective      mechanical assessment    performed, see objective         5xSTS  Performed, see objective      Manuals                                                                     Neuro Re-Ed      25'        bridge     1 x 10 ROM limited, 2x        supine clam     1 x 10 YTB, 2x       TA w/ add ball squeeze   5\" x 10, 2x      side step      w/ rail 10' R/L         pallof press      1 x 10 R/L yellow tubing w/ CS        shoulder extension     2 x 10 yellow tubing w/ CS                                   Ther Ex  15'  40'  10'       Supine NG  1 x 5 R/L w/ sos 1 x 5 R/L w/ sos, 2x     Supine LE up contralateral shin  1 x 5 R/L w/ sos      Supine DKTC        w/ caution     REI w/ hands at hips      at counter (hands on counter) 1 x 10       BRIANA w/ knee flex      1 x 10 B/L        stand hip ext/ abd              HR/TR                           Objective measures   [facility] assessment, flexibility, 5xSTS         EDUCATION: Pathology, review of impairements, prognosis, activity modification, POC, and HEP KE + Pt was educated in spine anatomy, concept of peripheralization vs centralization, activity modification, posture, and use of lumbar roll.    HEP updated and reviewed  Reviewed concept of peripheralization and centralization; instructed to discontinue if pain peripheralizes or increases significantly       Ther Activity              STS        high mat                   Gait Training                                         Modalities                                         HEP:   9/17/2024:     Access Code: [email or web address]  URL: [email or web address]  Date: 09/19/2024  Prepared by: Yenifer Richardson    Exercises  - Standing Lumbar Extension with Counter  - 4-5 x daily - 1 sets - 10 reps         "

## 2024-09-24 ENCOUNTER — APPOINTMENT (OUTPATIENT)
Dept: PHYSICAL THERAPY | Facility: CLINIC | Age: 44
End: 2024-09-24
Payer: COMMERCIAL

## 2024-09-25 ENCOUNTER — OFFICE VISIT (OUTPATIENT)
Dept: PHYSICAL THERAPY | Facility: CLINIC | Age: 44
End: 2024-09-25
Payer: COMMERCIAL

## 2024-09-25 DIAGNOSIS — M54.16 LUMBAR RADICULOPATHY: Primary | ICD-10-CM

## 2024-09-25 PROCEDURE — 97110 THERAPEUTIC EXERCISES: CPT

## 2024-09-25 PROCEDURE — 97112 NEUROMUSCULAR REEDUCATION: CPT

## 2024-09-25 NOTE — PROGRESS NOTES
"Daily Note     Today's date: 2024  Patient name: Kvng Quevedo  : 1980  MRN: [id number]  Referring provider: Marcos Sellers MD  Dx:   Encounter Diagnosis     ICD-10-CM    1. Lumbar radiculopathy  M54.16                      Subjective: pt reports that the REI has cont to be helpful. With the rainy weather her L knee is bothering her and is having pain in her back. She has not yet performed her REI yet today.       Objective: See treatment diary below  REI=NE       Assessment: Tolerated treatment well. Pt does require VC for form and repetitions. Pt dem limited mobility during supine sciatic NG. Cont to progress functional mobility as tolerated. Patient would benefit from continued PT      Plan: Continue per plan of care.      Precautions:   Weakness of BLE; provide CS w/ ambulation in clinic due to impaired balance  Hx of Bipolar Disorder and Anxiety  Pt scheduled for C7-T1 CERVICAL EPIDURAL STEROID INJECTION (Spine Cervical) 2024    No past medical history on file.  No past surgical history on file.    SOC: 2024  FOTO: 2024  POC Expiration: 12/10/2024  Daily Treatment Log  Date: Initial Evaluation 2924     Visit#/ auth: 1  2  3  4      Objective Measures             flexibility  Assessed, see objective      mechanical assessment    performed, see objective         5xSTS  Performed, see objective      Manuals                                                                     Neuro Re-Ed      25'  25'       bridge     1 x 10 ROM limited, 2x  1x10; 2x       supine clam     1 x 10 YTB, 2x  1 x 10 YTB, 2x      TA w/ add ball squeeze   5\" x 10, 2x 5\"x10; 2x      side step      w/ rail 10' R/L   YTB @ knees 2 laps near rail       pallof press      1 x 10 R/L yellow tubing w/ CS  yellow tubing 2x10 R/L       shoulder extension     2 x 10 yellow tubing w/ CS  2x10 yellow tubing                                  Ther Ex  15'  40'  10'  15'      Supine NG  1 x 5 R/L " w/ sos 1 x 5 R/L w/ sos, 2x 1x10 w/ sos     Supine LE up contralateral shin  1 x 5 R/L w/ sos      Supine DKTC      w/ caution       REI w/ hands at hips      at counter (hands on counter) 1 x 10  1x10       BRIANA w/ knee flex      1 x 10 B/L  1x15 B/L       stand hip ext/ abd              HR/TR                           Objective measures   mech assessment, flexibility, 5xSTS         EDUCATION: Pathology, review of impairements, prognosis, activity modification, POC, and HEP KE + Pt was educated in spine anatomy, concept of peripheralization vs centralization, activity modification, posture, and use of lumbar roll.    HEP updated and reviewed  Reviewed concept of peripheralization and centralization; instructed to discontinue if pain peripheralizes or increases significantly       Ther Activity              STS        elevated mat 1x10; 2x                    Gait Training                                         Modalities                                         HEP:   9/17/2024:     Access Code: [email or web address]  URL: [email or web address]  Date: 09/19/2024  Prepared by: Yenifer Richardson    Exercises  - Standing Lumbar Extension with Counter  - 4-5 x daily - 1 sets - 10 reps

## 2024-09-30 ENCOUNTER — APPOINTMENT (OUTPATIENT)
Dept: PHYSICAL THERAPY | Facility: CLINIC | Age: 44
End: 2024-09-30
Payer: COMMERCIAL

## 2024-09-30 ENCOUNTER — TELEPHONE (OUTPATIENT)
Dept: PHYSICAL THERAPY | Facility: CLINIC | Age: 44
End: 2024-09-30

## 2024-10-01 ENCOUNTER — TELEPHONE (OUTPATIENT)
Dept: PHYSICAL THERAPY | Facility: CLINIC | Age: 44
End: 2024-10-01

## 2024-10-01 NOTE — TELEPHONE ENCOUNTER
Pt called asking for an earlier time for her appt tomorrow. We agreed to r/s her to 11:00 am instead of 4:15 pm

## 2024-10-02 ENCOUNTER — OFFICE VISIT (OUTPATIENT)
Dept: PHYSICAL THERAPY | Facility: CLINIC | Age: 44
End: 2024-10-02
Payer: COMMERCIAL

## 2024-10-02 DIAGNOSIS — M54.16 LUMBAR RADICULOPATHY: Primary | ICD-10-CM

## 2024-10-02 PROCEDURE — 97110 THERAPEUTIC EXERCISES: CPT

## 2024-10-02 PROCEDURE — 97112 NEUROMUSCULAR REEDUCATION: CPT

## 2024-10-02 NOTE — PROGRESS NOTES
Daily Note     Today's date: 10/2/2024  Patient name: Kvng Quevedo  : 1980  MRN: [id number]  Referring provider: Marcos Sellers MD  Dx:   Encounter Diagnosis     ICD-10-CM    1. Lumbar radiculopathy  M54.16                      Subjective: Pt states that she feels her mobility is improving. She has been performing repeated extension in standing about 2 times per day.       Objective: See treatment diary below      Assessment: Introduced pt to standing hip extension and abduction at the rail; no complaints offered. Pt introduced to standing toe raises and heel raises; able to demonstrate full dorsiflexion AROM, however PF ROM is limited; able to clear heels from floor w/ UE support on rail. Pt presents with improving coordination of supine nerve glide demonstrating familiarity with this exercise; she continues to require cues for form however, and ROM remains limited. Pt able to lift hips from table during second set of glute bridges today which is progress compared to prior sessions. Tolerated treatment well. Patient would benefit from continued PT.       Plan: Continue per plan of care.  Progress treatment as tolerated.       Precautions:   Weakness of BLE; provide CS w/ ambulation in clinic due to impaired balance  Hx of Bipolar Disorder and Anxiety  Pt scheduled for C7-T1 CERVICAL EPIDURAL STEROID INJECTION (Spine Cervical) 2024    No past medical history on file.  No past surgical history on file.    SOC: 2024  FOTO: 2024  POC Expiration: 12/10/2024  Daily Treatment Log  Date: Initial Evaluation 2924  2024  2024  10/2/2024   Visit#/ auth: 1  2  3  4      Objective Measures             flexibility  Assessed, see objective      mechanical assessment    performed, see objective         5xSTS  Performed, see objective      Manuals                                                                     Neuro Re-Ed      25'  '   '    bridge     1 x 10 ROM limited, 2x   "1x10; 2x  1x10 ROM limited; 2x     supine clam     1 x 10 YTB, 2x  1 x 10 YTB, 2x  1 x 15 YTB   TA w/ add ball squeeze   5\" x 10, 2x 5\"x10; 2x  5\"x10; 2x     side step      w/ rail 10' R/L   YTB @ knees 2 laps near rail  YTB @ knees 2 laps near rail     pallof press      1 x 10 R/L yellow tubing w/ CS  yellow tubing 2x10 R/L  yellow tubing 2x10 R/L     shoulder extension     2 x 10 yellow tubing w/ CS  2x10 yellow tubing   2x10 yellow tubing                                Ther Ex  15'  40'  10'  15'   15'   Supine NG  1 x 5 R/L w/ sos 1 x 5 R/L w/ sos, 2x 1x10 w/ sos  1x10 w/ sos    Supine LE up contralateral shin  1 x 5 R/L w/ sos      Supine DKTC      w/ caution       REI w/ hands at hips      at counter (hands on counter) 1 x 10  1x10    1 x 10   BRIANA w/ knee flex      1 x 10 B/L  1x15 B/L   1x15 B/L     stand hip ext/ abd         1 x 10 ea R/L alt at rail    HR/TR         1 x 10 ea at rail                 Objective measures   [facility] assessment, flexibility, 5xSTS         EDUCATION: Pathology, review of impairements, prognosis, activity modification, POC, and HEP KE + Pt was educated in spine anatomy, concept of peripheralization vs centralization, activity modification, posture, and use of lumbar roll.    HEP updated and reviewed  Reviewed concept of peripheralization and centralization; instructed to discontinue if pain peripheralizes or increases significantly    HEP updated and reviewed   Ther Activity              STS        elevated mat 1x10; 2x                   Gait Training                                         Modalities                                         HEP:   Access Code: [email or web address]  URL: [email or web address]  Date: 10/02/2024  Prepared by: Yenifer Richardson    Exercises  - Standing Lumbar Extension with Counter  - 4-5 x daily - 1 sets - 10 reps  - Hooklying Clamshell with Resistance  - 1 x daily - 1 sets - 10 reps  - Beginner Bridge  - 1 x daily - 1 sets - 10 reps  - Supine Hip " Adduction Isometric with Ball  - 1 x daily - 1 sets - 10 reps - 5 seconds hold

## 2024-10-03 ENCOUNTER — OFFICE VISIT (OUTPATIENT)
Dept: PHYSICAL THERAPY | Facility: CLINIC | Age: 44
End: 2024-10-03
Payer: COMMERCIAL

## 2024-10-03 DIAGNOSIS — M54.16 LUMBAR RADICULOPATHY: Primary | ICD-10-CM

## 2024-10-03 PROCEDURE — 97110 THERAPEUTIC EXERCISES: CPT

## 2024-10-03 PROCEDURE — 97112 NEUROMUSCULAR REEDUCATION: CPT

## 2024-10-03 NOTE — PROGRESS NOTES
Daily Note     Today's date: 10/3/2024  Patient name: Kvng Quevedo  : 1980  MRN: [id number]  Referring provider: Marcos Sellers MD  Dx:   Encounter Diagnosis     ICD-10-CM    1. Lumbar radiculopathy  M54.16                      Subjective: Pt reports muscle fatigue after yesterday's PT session. States that REI continues to provide low back stretch. She states that her leg pain has not increased. She states that she can feel her legs are working out; she reports appropriate muscle fatigue/ soreness. Pt states that she does better in the morning. She has not trialed HEP being that it was just updated yesterday afternoon.       Objective: See treatment diary below      Assessment: Pt demonstrates familiarity w/ program requiring less cues for form correction. Able to progress to 2 sets of hip ext/abd vs resistance. Pt continues to demonstrate improving AROM w/ glute bridges and supine nerve glides. Returned to stretch sliding LE up contralateral shin w/ sos; less difficulty compared to completion of this exercise in prior session. Held on second set of add ball squeeze in hook-lying as pt is challenged by this exercise; pt demonstrating greater degree of adduction vs ball, however. Tolerated treatment well. Patient would benefit from continued PT.       Plan: Continue per plan of care.  Progress treatment as tolerated.        Precautions:   Weakness of BLE; provide CS w/ ambulation in clinic due to impaired balance  Hx of Bipolar Disorder and Anxiety  Pt scheduled for C7-T1 CERVICAL EPIDURAL STEROID INJECTION (Spine Cervical) 2024    No past medical history on file.  No past surgical history on file.    SOC: 2024  FOTO: 2024  POC Expiration: 12/10/2024  Auth expiration: 10/29/2024  Daily Treatment Log  Date: 10/3/2024 Next session  2024  2024  10/2/2024   Visit#/ auth:    3     Auth expiration 10/29/2024       Objective Measures           flexibility        mechanical  "assessment           5xSTS        Manuals                                                           Neuro Re-Ed 25'   25'  25'   25'    bridge 1x10 ROM improving; 2x   1 x 10 ROM limited, 2x  1x10; 2x  1x10 ROM limited; 2x     supine clam 1 x 15 YTB, 2x  1 x 10 YTB, 2x  1 x 10 YTB, 2x  1 x 15 YTB   TA w/ add ball squeeze 5\"x10  5\" x 10, 2x 5\"x10; 2x  5\"x10; 2x     side step YTB @ knees 4 laps near tx table (6')   w/ rail 10' R/L   YTB @ knees 2 laps near rail  YTB @ knees 2 laps near rail     pallof press yellow tubing 2x10 R/L    1 x 10 R/L yellow tubing w/ CS  yellow tubing 2x10 R/L  yellow tubing 2x10 R/L     shoulder extension 2x10 yellow tubing   2 x 10 yellow tubing w/ CS  2x10 yellow tubing   2x10 yellow tubing                            Ther Ex 10'   10'  15'   15'   Supine NG 1x10 w/ sos   1 x 5 R/L w/ sos, 2x 1x10 w/ sos  1x10 w/ sos    Supine LE up contralateral shin 1 x 10 w/ sos       Supine DKTC    w/ caution       REI w/ hands at hips 1 x 10, 2x   at counter (hands on counter) 1 x 10  1x10    1 x 10   BRIANA w/ knee flex 1x15 B/L   1 x 10 B/L  1x15 B/L   1x15 B/L     stand hip ext/ abd 1 x 10 ea R/L alt at rail YTB prox knee, 2x      1 x 10 ea R/L alt at rail    HR/TR 1 x 10 ea at rail      1 x 10 ea at rail               Objective measures           EDUCATION: Pathology, review of impairements, prognosis, activity modification, POC, and HEP    Reviewed concept of peripheralization and centralization; instructed to discontinue if pain peripheralizes or increases significantly    HEP updated and reviewed   Ther Activity            STS elevated mat 1x10     elevated mat 1x10; 2x                 Gait Training                                   Modalities                                   HEP:   Access Code: [email or web address]  URL: [email or web address]  Date: 10/02/2024  Prepared by: Yenifer Richardson    Exercises  - Standing Lumbar Extension with Counter  - 4-5 x daily - 1 sets - 10 reps  - Hooklying " Clamshell with Resistance  - 1 x daily - 1 sets - 10 reps  - Beginner Bridge  - 1 x daily - 1 sets - 10 reps  - Supine Hip Adduction Isometric with Ball  - 1 x daily - 1 sets - 10 reps - 5 seconds hold

## 2024-10-04 ENCOUNTER — TELEPHONE (OUTPATIENT)
Dept: PAIN MEDICINE | Facility: MEDICAL CENTER | Age: 44
End: 2024-10-04

## 2024-10-09 ENCOUNTER — OFFICE VISIT (OUTPATIENT)
Dept: PHYSICAL THERAPY | Facility: CLINIC | Age: 44
End: 2024-10-09
Payer: COMMERCIAL

## 2024-10-09 DIAGNOSIS — M54.16 LUMBAR RADICULOPATHY: Primary | ICD-10-CM

## 2024-10-09 PROCEDURE — 97112 NEUROMUSCULAR REEDUCATION: CPT

## 2024-10-09 PROCEDURE — 97110 THERAPEUTIC EXERCISES: CPT

## 2024-10-09 NOTE — PROGRESS NOTES
Daily Note     Today's date: 10/9/2024  Patient name: Kvng Quevedo  : 1980  MRN: [id number]  Referring provider: Marcos Sellers MD  Dx:   Encounter Diagnosis     ICD-10-CM    1. Lumbar radiculopathy  M54.16                      Subjective: Pt continues to have low back pain. Her left knee is painful as well; she attributes this to fluid in her knee. She states that she has pain from the posterior lateral R hip down to the R lower leg into the calf. Initially states that pain is vascular but corrects herself to states that pain is probably muscle soreness. Pt states that home exercises are going well. She denies increase in pain w/ home program. She states that she feels she has more mobility in low back. She continues to experience relief in RLE with repeated lumbar extension in standing on home program. Pt states that pain in R leg today is consistent pain reported on IE regarding location and sensation, however pain intensity has decreased.      Objective: See treatment diary below    Inspection of RLE: no swelling, no redness, no gross abnormality on palpation compared to contralateral gastroc, RLE Brandt sign negative, no facial grimacing w/ squeeze to R gastroc.     Assessment: Pt educated on difference between vascular, muscular, and nerve pain; educated on signs and symptoms of DVT; pt denies these symptoms. Decreased reps of supine heel slide up contralateral shin due to L knee pain. Pt requires less VC for form correction w/ pallof press today. Added standing exercises at rail to HEP. Tolerated treatment well. Patient would benefit from continued PT.       Plan: Continue per plan of care.  Progress treatment as tolerated.       Precautions:   Weakness of BLE; provide CS w/ ambulation in clinic due to impaired balance  Hx of Bipolar Disorder and Anxiety  Pt scheduled for C7-T1 CERVICAL EPIDURAL STEROID INJECTION (Spine Cervical) 2024    No past medical history on file.  No past surgical  "history on file.    SOC: 9/17/2024  FOTO: 9/17/2024  POC Expiration: 12/10/2024  Auth expiration: 10/29/2024  Daily Treatment Log  Date: 10/3/2024 10/9/2024   9/25/2024  10/2/2024   Visit#/ auth: 6/24 7/24   4   5/12   Auth expiration 10/29/2024 10/29/2024      Objective Measures          flexibility        mechanical assessment          5xSTS        Manuals                                                      Neuro Re-Ed 25' 25'   25'   25'    bridge 1x10 ROM improving; 2x  1x10 ROM improving; 2x    1x10; 2x  1x10 ROM limited; 2x     supine clam 1 x 15 YTB, 2x 1 x 15 YTB   1 x 10 YTB, 2x  1 x 15 YTB   TA w/ add ball squeeze 5\"x10 5\"x10  5\"x10; 2x  5\"x10; 2x     side step YTB @ knees 4 laps near tx table (6') YTB @ knees 3 laps near rail   YTB @ knees 2 laps near rail  YTB @ knees 2 laps near rail     pallof press yellow tubing 2x10 R/L  yellow tubing 2x10 R/L    yellow tubing 2x10 R/L  yellow tubing 2x10 R/L     shoulder extension 2x10 yellow tubing  2x15 yellow tubing    2x10 yellow tubing   2x10 yellow tubing                          Ther Ex 10' 15'   15'   15'   Supine NG 1x10 w/ sos  1x10 w/ sos   1x10 w/ sos  1x10 w/ sos    Supine LE up contralateral shin 1 x 10 w/ sos 1x5 w/ sos       Supine DKTC          REI w/ hands at hips 1 x 10, 2x 1 x 10   1x10    1 x 10   BRIANA w/ knee flex 1x15 B/L 1x10 B/L   1x15 B/L   1x15 B/L     stand hip ext/ abd 1 x 10 ea R/L alt at rail YTB prox knee, 2x 1 x 10 ea R/L alt at rail YTB prox knee,    1 x 10 ea R/L alt at rail    HR/TR 1 x 10 ea at rail 1 x 10 ea at rail    1 x 10 ea at rail              Objective measures          EDUCATION: Pathology, review of impairements, prognosis, activity modification, POC, and HEP  HEP updated and reviewed     HEP updated and reviewed   Ther Activity           STS elevated mat 1x10    elevated mat 1x10; 2x                Gait Training                                Modalities                                HEP:   Access Code: [email or web address]  URL: " [email or web address]  Date: 10/09/2024  Prepared by: Yenifer Richardson    Exercises  - Standing Lumbar Extension with Counter  - 4-5 x daily - 1 sets - 10 reps  - Hooklying Clamshell with Resistance  - 1 x daily - 1 sets - 10 reps  - Beginner Bridge  - 1 x daily - 1 sets - 10 reps  - Supine Hip Adduction Isometric with Ball  - 1 x daily - 1 sets - 10 reps - 5 seconds hold  - Side Stepping with Counter Support  - 1 x daily - 2 sets  - Standing Hip Abduction with Counter Support  - 1 x daily - 1 sets - 10 reps  - Standing Hip Extension with Counter Support  - 1 x daily - 1 sets - 10 reps

## 2024-10-15 ENCOUNTER — OFFICE VISIT (OUTPATIENT)
Dept: PHYSICAL THERAPY | Facility: CLINIC | Age: 44
End: 2024-10-15
Payer: COMMERCIAL

## 2024-10-15 DIAGNOSIS — M54.16 LUMBAR RADICULOPATHY: Primary | ICD-10-CM

## 2024-10-15 PROCEDURE — 97110 THERAPEUTIC EXERCISES: CPT

## 2024-10-15 PROCEDURE — 97112 NEUROMUSCULAR REEDUCATION: CPT

## 2024-10-15 NOTE — PROGRESS NOTES
"Daily Note     Today's date: 10/15/2024  Patient name: Kvng Quevedo  : 1980  MRN: [id number]  Referring provider: Marcos Sellers MD  Dx:   Encounter Diagnosis     ICD-10-CM    1. Lumbar radiculopathy  M54.16                      Subjective: pt reports that she is having pain L LE down to her knee and R LE to her calf but mostly in the LB. Her neck pain never goes away, she did have some relief after her injection but it is starting to return.       Objective: See treatment diary below      Assessment: Tolerated treatment well. Pt cont to dem ERP w/ most all TE. Improving ROM w/ bridging. Patient exhibited good technique with therapeutic exercises and would benefit from cont global movement to address mobility deficits due to pain.       Plan: Continue per plan of care.      Precautions:   Weakness of BLE; provide CS w/ ambulation in clinic due to impaired balance  Hx of Bipolar Disorder and Anxiety  Pt scheduled for C7-T1 CERVICAL EPIDURAL STEROID INJECTION (Spine Cervical) 2024    No past medical history on file.  No past surgical history on file.    SOC: 2024  FOTO: 2024  POC Expiration: 12/10/2024  Auth expiration: 10/29/2024  Daily Treatment Log  Date: 10/3/2024 10/9/2024 10/15/2024   10/2/2024   Visit#/ auth:    Auth expiration 10/29/2024 10/29/2024 10/29/2024     Objective Measures         flexibility        mechanical assessment          5xSTS        Manuals                                                      Neuro Re-Ed 25' 25' 25'    25'    bridge 1x10 ROM improving; 2x  1x10 ROM improving; 2x  1x10; 2x   1x10 ROM limited; 2x     supine clam 1 x 15 YTB, 2x 1 x 15 YTB YTB 1x15 R/L alt iso   1 x 15 YTB   TA w/ add ball squeeze 5\"x10 5\"x10 5\"x10   5\"x10; 2x     side step YTB @ knees 4 laps near tx table (6') YTB @ knees 3 laps near rail   YTB @ knees 2 laps near rail     pallof press yellow tubing 2x10 R/L  yellow tubing 2x10 R/L  Yellow tubing 2x10 R/L   yellow " tubing 2x10 R/L     shoulder extension 2x10 yellow tubing  2x15 yellow tubing  Yellow tubing 2x10   2x10 yellow tubing     bwd shoulder rolls    1# DB 10x        std open books    1x10 R/L        Ther Ex 10' 15' 15'    15'   Supine NG 1x10 w/ sos  1x10 w/ sos  1x10 w/ sos   1x10 w/ sos    Supine LE up contralateral shin 1 x 10 w/ sos 1x5 w/ sos       Supine DKTC          REI w/ hands at hips 1 x 10, 2x 1 x 10 1x10 to start    1 x 10   BRIANA w/ knee flex 1x15 B/L 1x10 B/L    1x15 B/L     stand hip ext/ abd 1 x 10 ea R/L alt at rail YTB prox knee, 2x 1 x 10 ea R/L alt at rail YTB prox knee, YTB @ knees 1x10 ea alt R/L; 2x   1 x 10 ea R/L alt at rail    HR/TR 1 x 10 ea at rail 1 x 10 ea at rail 1x10 ea     1 x 10 ea at rail              Objective measures          EDUCATION: Pathology, review of impairements, prognosis, activity modification, POC, and HEP  HEP updated and reviewed     HEP updated and reviewed   Ther Activity           STS elevated mat 1x10  Chair 1x10; 2x                  Gait Training                                Modalities                                HEP:   Access Code: [email or web address]  URL: [email or web address]  Date: 10/09/2024  Prepared by: Yenifer Richardson    Exercises  - Standing Lumbar Extension with Counter  - 4-5 x daily - 1 sets - 10 reps  - Hooklying Clamshell with Resistance  - 1 x daily - 1 sets - 10 reps  - Beginner Bridge  - 1 x daily - 1 sets - 10 reps  - Supine Hip Adduction Isometric with Ball  - 1 x daily - 1 sets - 10 reps - 5 seconds hold  - Side Stepping with Counter Support  - 1 x daily - 2 sets  - Standing Hip Abduction with Counter Support  - 1 x daily - 1 sets - 10 reps  - Standing Hip Extension with Counter Support  - 1 x daily - 1 sets - 10 reps

## 2024-10-17 ENCOUNTER — OFFICE VISIT (OUTPATIENT)
Dept: PHYSICAL THERAPY | Facility: CLINIC | Age: 44
End: 2024-10-17
Payer: COMMERCIAL

## 2024-10-17 DIAGNOSIS — M54.16 LUMBAR RADICULOPATHY: Primary | ICD-10-CM

## 2024-10-17 PROCEDURE — 97110 THERAPEUTIC EXERCISES: CPT

## 2024-10-17 PROCEDURE — 97112 NEUROMUSCULAR REEDUCATION: CPT

## 2024-10-17 NOTE — PROGRESS NOTES
"Daily Note     Today's date: 10/17/2024  Patient name: Kvng Quevedo  : 1980  MRN: [id number]  Referring provider: Marcos Sellers MD  Dx:   Encounter Diagnosis     ICD-10-CM    1. Lumbar radiculopathy  M54.16                      Subjective: pt reports her back cont to be painful however feels she has been tolerating the TE well and has been performing her HEP       Objective: See treatment diary below      Assessment: Tolerated treatment well. Pt remains limited in most end range UE movements due to pain. Pt dem good tolerance to current POC and more indep w/ less cueing t/o session. Patient would benefit from continued PT      Plan: Continue per plan of care.      Precautions:   Weakness of BLE; provide CS w/ ambulation in clinic due to impaired balance  Hx of Bipolar Disorder and Anxiety  Pt scheduled for C7-T1 CERVICAL EPIDURAL STEROID INJECTION (Spine Cervical) 2024    No past medical history on file.  No past surgical history on file.    SOC: 2024  FOTO: 2024  POC Expiration: 12/10/2024  Auth expiration: 10/29/2024  Daily Treatment Log  Date: 10/3/2024 10/9/2024 10/15/2024 10/17/2024    Visit#/ auth:     Auth expiration 10/29/2024 10/29/2024 10/29/2024 10/29/2024    Objective Measures        flexibility        mechanical assessment          5xSTS        Manuals                                                      Neuro Re-Ed 25' 25' 25'  25'     UBE retro     3'     bridge 1x10 ROM improving; 2x  1x10 ROM improving; 2x  1x10; 2x       supine clam 1 x 15 YTB, 2x 1 x 15 YTB YTB 1x15 R/L alt iso      TA w/ add ball squeeze 5\"x10 5\"x10 5\"x10       side step YTB @ knees 4 laps near tx table (6') YTB @ knees 3 laps near rail  YTB @ shins 2 laps near rail; 2x     pallof press yellow tubing 2x10 R/L  yellow tubing 2x10 R/L  Yellow tubing 2x10 R/L  Yellow tubing 2x12      shoulder extension 2x10 yellow tubing  2x15 yellow tubing  Yellow tubing 2x10 Yellow tubing 2x12     bwd " shoulder rolls    1# DB 10x  1# DB 15x      std open books    1x10 R/L   1x10 R/L     Ther Ex 10' 15' 15'  15'     Supine NG 1x10 w/ sos  1x10 w/ sos  1x10 w/ sos      Supine LE up contralateral shin 1 x 10 w/ sos 1x5 w/ sos       Supine DKTC         REI w/ hands at hips 1 x 10, 2x 1 x 10 1x10 to start  1x10     BRIANA w/ knee flex 1x15 B/L 1x10 B/L       stand hip ext/ abd 1 x 10 ea R/L alt at rail YTB prox knee, 2x 1 x 10 ea R/L alt at rail YTB prox knee, YTB @ knees 1x10 ea alt R/L; 2x  YTB @ shins 2x10 ea alt R/L     HR/TR 1 x 10 ea at rail 1 x 10 ea at rail 1x10 ea    HR off step 2x10                Objective measures         EDUCATION: Pathology, review of impairements, prognosis, activity modification, POC, and HEP  HEP updated and reviewed       Ther Activity           STS elevated mat 1x10  Chair 1x10; 2x   low mat 2x10                Gait Training                                Modalities                                HEP:   Access Code: [email or web address]  URL: [email or web address]  Date: 10/09/2024  Prepared by: Yenifer Ricahrdson    Exercises  - Standing Lumbar Extension with Counter  - 4-5 x daily - 1 sets - 10 reps  - Hooklying Clamshell with Resistance  - 1 x daily - 1 sets - 10 reps  - Beginner Bridge  - 1 x daily - 1 sets - 10 reps  - Supine Hip Adduction Isometric with Ball  - 1 x daily - 1 sets - 10 reps - 5 seconds hold  - Side Stepping with Counter Support  - 1 x daily - 2 sets  - Standing Hip Abduction with Counter Support  - 1 x daily - 1 sets - 10 reps  - Standing Hip Extension with Counter Support  - 1 x daily - 1 sets - 10 reps

## 2024-10-24 ENCOUNTER — EVALUATION (OUTPATIENT)
Dept: PHYSICAL THERAPY | Facility: CLINIC | Age: 44
End: 2024-10-24
Payer: COMMERCIAL

## 2024-10-24 DIAGNOSIS — M54.16 LUMBAR RADICULOPATHY: Primary | ICD-10-CM

## 2024-10-24 PROCEDURE — 97112 NEUROMUSCULAR REEDUCATION: CPT

## 2024-10-24 PROCEDURE — 97110 THERAPEUTIC EXERCISES: CPT
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
## 2024-10-27 DIAGNOSIS — M54.12 CERVICAL RADICULOPATHY: ICD-10-CM

## 2024-10-28 ENCOUNTER — OFFICE VISIT (OUTPATIENT)
Dept: PHYSICAL THERAPY | Facility: CLINIC | Age: 44
End: 2024-10-28
Payer: COMMERCIAL

## 2024-10-28 DIAGNOSIS — M54.16 LUMBAR RADICULOPATHY: Primary | ICD-10-CM

## 2024-10-28 PROCEDURE — 97112 NEUROMUSCULAR REEDUCATION: CPT

## 2024-10-28 NOTE — PROGRESS NOTES
"Daily Note     Today's date: 10/28/2024  Patient name: Kvng Quevedo  : 1980  MRN: [id number]  Referring provider: Marcos Sellers MD  Dx:   Encounter Diagnosis     ICD-10-CM    1. Lumbar radiculopathy  M54.16                      Subjective: pt reports things are so/so today. Reports that her back from top to bottom and down the back of her legs to the calves is painful. Has not done any exercises yet this morning.        Objective: See treatment diary below      Assessment: Tolerated treatment well. Pt dem some dec pain in her back post session after bridging/BRIANA w/ knee flex. Pt edu to cont w/ these as needed. Cont to progress core and proximal hip strengthening as able. Patient would benefit from continued PT      Plan: Continue per plan of care.      Precautions:   Hx of Bipolar Disorder and Anxiety  Pt scheduled for C7-T1 CERVICAL EPIDURAL STEROID INJECTION (Spine Cervical) 2024    No past medical history on file.  Past Surgical History:   Procedure Laterality Date    EPIDURAL BLOCK INJECTION N/A 2024    Procedure: C7-T1 CERVICAL EPIDURAL STEROID INJECTION;  Surgeon: Marcos Sellers MD;  Location: [facility] MAIN OR;  Service: Pain Management        SOC: 2024  FOTO: NA  RE: 10/25/2024  POC Expiration: 12/10/2024  Auth expiration: 10/29/2024  Daily Treatment Log  Date: 10/28/2024  10/15/2024 10/17/2024 10/24/2024   Visit#/ auth: 11/12  8/12 9/12 10/12  RE   Auth expiration 10/29/2024  10/29/2024 10/29/2024 10/29/2024   Objective Measures        flexibility        mechanical assessment          5xSTS 20.85 sec  Increased pain in tailbone        Manuals                                                      Neuro Re-Ed 30'   25'  25'  25'   UBE retro     3'     bridge 1x10; 2x   1x10; 2x   1 x 10, 2x    supine clam YTB 1x10; 2x   YTB 1x15 R/L alt iso   YTB 2x10 R/L alt iso    TA w/ add ball squeeze   5\"x10   5\"x10    side step    YTB @ shins 2 laps near rail; 2x     pallof press Red tubing 2x10 " "ea   Yellow tubing 2x10 R/L  Yellow tubing 2x12  Red tubing 2x10    shoulder extension Red tubing 2x10   Yellow tubing 2x10 Yellow tubing 2x12 Red tubing  2x 10    bwd shoulder rolls    1# DB 10x  1# DB 15x      std open books    1x10 R/L   1x10 R/L     Ther Ex   15'  15'  15'   Supine NG Seated LAQ w/ DF 1x10 R/L   1x10 w/ sos      Supine LE up contralateral shin        REI w/ hands at hips 1x10=NE   1x10 to start  1x10  1x10    BRIANA w/ knee flex 20x         stand hip ext/ abd YTB @ shins 1x10 ea alt R/L   YTB @ knees 1x10 ea alt R/L; 2x  YTB @ shins 2x10 ea alt R/L YTB @ shins 1x10 ea alt R/L    HR/TR   1x10 ea    HR off step 2x10  HR off step 2x10     LTR  5\"x10 R/L          Objective measures      AROM, MMT, Function   EDUCATION: Pathology, review of impairements, prognosis, activity modification, POC, and HEP         Ther Activity       5'    STS   Chair 1x10; 2x   low mat 2x10  Low mat  2x10              Gait Training                                Modalities                                HEP:   Access Code: [email or web address]  URL: [email or web address]  Date: 10/24/2024  Prepared by: Yenifer Richardson    Exercises  - Standing Lumbar Extension with Counter  - 2-3 x daily - 1 sets - 10 reps  - Hooklying Clamshell with Resistance  - 1 x daily - 2 sets - 10 reps  - Beginner Bridge  - 1 x daily - 2 sets - 10 reps  - Supine Hip Adduction Isometric with Ball  - 1 x daily - 1 sets - 10 reps - 5 seconds hold  - Side Stepping with Counter Support  - 1 x daily - 2 sets  - Standing Hip Abduction with Counter Support  - 1 x daily - 1 sets - 10 reps  - Standing Hip Extension with Counter Support  - 1 x daily - 1 sets - 10 reps  - Shoulder extension with resistance - Neutral  - 1 x daily - 1 sets - 10 reps  - Standing Anti-Rotation Press with Anchored Resistance  - 1 x daily - 1 sets - 10 reps         "

## 2024-10-31 RX ORDER — GABAPENTIN 400 MG/1
400 CAPSULE ORAL
Qty: 30 CAPSULE | Refills: 1 | Status: SHIPPED | OUTPATIENT
Start: 2024-10-31 | End: 2025-01-29

## 2024-11-06 ENCOUNTER — OFFICE VISIT (OUTPATIENT)
Dept: PHYSICAL THERAPY | Facility: CLINIC | Age: 44
End: 2024-11-06
Payer: COMMERCIAL

## 2024-11-06 DIAGNOSIS — M54.16 LUMBAR RADICULOPATHY: Primary | ICD-10-CM

## 2024-11-06 PROCEDURE — 97112 NEUROMUSCULAR REEDUCATION: CPT

## 2024-11-06 PROCEDURE — 97110 THERAPEUTIC EXERCISES: CPT

## 2024-11-06 NOTE — PROGRESS NOTES
Daily Note     Today's date: 2024  Patient name: Kvng Quevedo  : 1980  MRN: [id number]  Referring provider: Marcos Sellers MD  Dx:   Encounter Diagnosis     ICD-10-CM    1. Lumbar radiculopathy  M54.16                      Subjective: Patient states that she has increase in familiar pain from neck down. She states that she was feeling better w/ taking muscle relaxer and Naproxen but this was recently discontinued as she finished her prescribed medication. States that Gabapentin was recently renewed and this does help her to get restful sleep as she is no longer waking up from pain; states that she only takes this at night as it does make her tired. She attributes mild increase in leg pain compared to previous session due to recent short absence from PT due to waiting for insurance approval.     Objective: See treatment diary below      Assessment: Instructed pt to discuss increase in symptom severity w/ completion of previous prescription medication with referring provider; instructed pt to inform prescribing MD of tiredness w/ Gabapentin. Able to increase resistance of theraband with standing hip abduction and extension; pt demonstrates appropriate hip ROM w/ this activity; no complaints offered w/small progression of this activity. She demonstrates improved form w/ pallof press requiring few cues for correction. Pt reports relief w/ HR off step when stretching into dorsiflexion bilaterally; she reports appropriate stretch in LE and improvement in BLE pain w/ this exercise. She notes mild L medial knee pain w/ LAQ w/ DF; instructed pt to limit knee extension AROM; pt stating that this is muscle pain and choosing to complete set w/ full knee extension AROM despite PT instruction to limit ROM. Provided check in w/ patient toward end of session to ensure appropriate exercise intensity with today's session due to return to PT after short absence; pt with preference to hold on additional progressions  "today. Tolerated treatment well. Patient would benefit from continued PT.       Plan: Continue per plan of care.  Progress treatment as tolerated.       Precautions:   Hx of Bipolar Disorder and Anxiety  Pt scheduled for C7-T1 CERVICAL EPIDURAL STEROID INJECTION (Spine Cervical) 9/20/2024    No past medical history on file.  Past Surgical History:   Procedure Laterality Date    EPIDURAL BLOCK INJECTION N/A 9/20/2024    Procedure: C7-T1 CERVICAL EPIDURAL STEROID INJECTION;  Surgeon: Marcos Sellers MD;  Location: [facility] MAIN OR;  Service: Pain Management        SOC: 9/17/2024  FOTO: NA  RE: 10/25/2024  POC Expiration: 12/10/2024  Auth expiration: 10/29/2024  Daily Treatment Log  Date: 10/28/2024 11/6/2024 Next session 10/17/2024 10/24/2024   Visit#/ auth: 11/12 12/23  9/12 10/12  RE   Auth expiration 10/29/2024 1/28/2024  10/29/2024 10/29/2024   Objective Measures        flexibility        mechanical assessment          5xSTS 20.85 sec  Increased pain in tailbone        Manuals                                                      Neuro Re-Ed 30'  25'  25'  25'   UBE retro     3'     bridge 1x10; 2x  1x10; 2x    1 x 10, 2x    supine clam YTB 1x10; 2x  YTB 1x10; 2x    YTB 2x10 R/L alt iso    TA w/ add ball squeeze  5\"x10, yellow ball   5\"x10    side step  YTB @ shins 3 laps near rail; 2x  YTB @ shins 2 laps near rail; 2x     pallof press Red tubing 2x10 ea  Red tubing 2x10   Yellow tubing 2x12  Red tubing 2x10    shoulder extension Red tubing 2x10  Red tubing 2x10   Yellow tubing 2x12 Red tubing  2x 10   Low row  Red tubing 2x10        bwd shoulder rolls   1# DB 2x10  1# DB 15x      std open books   1x10 R/L    1x10 R/L     Ther Ex  10'  15'  15'   Supine NG Seated LAQ w/ DF 1x10 R/L  Seated LAQ w/ DF 1x10 R/L       Supine LE up contralateral shin        REI w/ hands at hips 1x10=NE  1x10  1x10  1x10    BRIANA w/ knee flex 20x         stand hip ext/ abd YTB @ shins 1x10 ea alt R/L  RTB @ prox knees 1x10 ea alt R/L   YTB " "@ shins 2x10 ea alt R/L YTB @ shins 1x10 ea alt R/L    HR/TR  HR off step 2x10  B/L    HR off step 2x10  HR off step 2x10     LTR  5\"x10 R/L          Objective measures      AROM, MMT, Function   EDUCATION: Pathology, review of impairements, prognosis, activity modification, POC, and HEP  Instructed pt to continue HEP for continued progress       Ther Activity       5'    STS     low mat 2x10  Low mat  2x10              Gait Training                                Modalities                                HEP:   Access Code: [email or web address]  URL: [email or web address]  Date: 10/24/2024  Prepared by: Yenifer Richardson    Exercises  - Standing Lumbar Extension with Counter  - 2-3 x daily - 1 sets - 10 reps  - Hooklying Clamshell with Resistance  - 1 x daily - 2 sets - 10 reps  - Beginner Bridge  - 1 x daily - 2 sets - 10 reps  - Supine Hip Adduction Isometric with Ball  - 1 x daily - 1 sets - 10 reps - 5 seconds hold  - Side Stepping with Counter Support  - 1 x daily - 2 sets  - Standing Hip Abduction with Counter Support  - 1 x daily - 1 sets - 10 reps  - Standing Hip Extension with Counter Support  - 1 x daily - 1 sets - 10 reps  - Shoulder extension with resistance - Neutral  - 1 x daily - 1 sets - 10 reps  - Standing Anti-Rotation Press with Anchored Resistance  - 1 x daily - 1 sets - 10 reps           "

## 2024-11-07 ENCOUNTER — OFFICE VISIT (OUTPATIENT)
Dept: PHYSICAL THERAPY | Facility: CLINIC | Age: 44
End: 2024-11-07
Payer: COMMERCIAL

## 2024-11-07 DIAGNOSIS — M54.16 LUMBAR RADICULOPATHY: Primary | ICD-10-CM

## 2024-11-07 PROCEDURE — 97110 THERAPEUTIC EXERCISES: CPT

## 2024-11-07 PROCEDURE — 97112 NEUROMUSCULAR REEDUCATION: CPT

## 2024-11-07 NOTE — PROGRESS NOTES
"Daily Note     Today's date: 2024  Patient name: Kvng Quevedo  : 1980  MRN: [id number]  Referring provider: Marcos Sellers MD  Dx:   Encounter Diagnosis     ICD-10-CM    1. Lumbar radiculopathy  M54.16                      Subjective: pt reports feeling so/so. She cont with pain in her LB and chest, she has continued w/ her stretching at home which is a bit helpful       Objective: See treatment diary below      Assessment: Tolerated treatment well. Pt does cont to dem some ERP roberto w/ UE movements OH or putting her chest on stretch. No noted increased pain during session. Cont to progress global movement and stretching as tolerated. Patient would benefit from continued PT      Plan: Continue per plan of care.      Precautions:   Hx of Bipolar Disorder and Anxiety  Pt scheduled for C7-T1 CERVICAL EPIDURAL STEROID INJECTION (Spine Cervical) 2024    No past medical history on file.  Past Surgical History:   Procedure Laterality Date    EPIDURAL BLOCK INJECTION N/A 2024    Procedure: C7-T1 CERVICAL EPIDURAL STEROID INJECTION;  Surgeon: Marcos Sellers MD;  Location: [facility] MAIN OR;  Service: Pain Management        SOC: 2024  FOTO: NA  RE: 10/25/2024  POC Expiration: 12/10/2024  Auth expiration: 10/29/2024  Daily Treatment Log  Date: 10/28/2024 2024 2024  10/24/2024   Visit#/ auth: 11/12 12/23 13/23  10/12  RE   Auth expiration 10/29/2024 2024 2024  10/29/2024   Objective Measures        flexibility        mechanical assessment          5xSTS 20.85 sec  Increased pain in tailbone        Manuals                                                     Neuro Re-Ed 30'  25' 25'    25'   UBE retro          bridge 1x10; 2x  1x10; 2x  1x10; 2x   1 x 10, 2x    supine clam YTB 1x10; 2x  YTB 1x10; 2x  RTB 1x10; 2x   YTB 2x10 R/L alt iso    TA w/ add ball squeeze  5\"x10, yellow ball 5\"x10   5\"x10    side step  YTB @ shins 3 laps near rail; 2x       pallof press Red tubing 2x10 ea  Red " "tubing 2x10  Red tubing 2x10   Red tubing 2x10    shoulder extension Red tubing 2x10  Red tubing 2x10  Red tubing 2x10   Red tubing  2x 10   Low row  Red tubing 2x10  Red tubing 2x10        bwd shoulder rolls   1# DB 2x10      Uni doorway shoulder ER stretch    5\"x10 R/L      Wall slides    5\"x10       std open books   1x10 R/L  1x10 R/L      Ther Ex  10' 15'  15'   Supine NG Seated LAQ w/ DF 1x10 R/L  Seated LAQ w/ DF 1x10 R/L  Seated LAQ w/ DF 1x10 R/L      Supine LE up contralateral shin        REI w/ hands at hips 1x10=NE  1x10 1x10  1x10    BRIANA w/ knee flex 20x         stand hip ext/ abd YTB @ shins 1x10 ea alt R/L  RTB @ prox knees 1x10 ea alt R/L  RTB @ prox knees 2x10 ea alt R/L    YTB @ shins 1x10 ea alt R/L    HR/TR  HR off step 2x10  B/L  HR off step 2x10  B/L   HR off step 2x10     LTR  5\"x10 R/L   5\"x10 R/L       Objective measures      AROM, MMT, Function   EDUCATION: Pathology, review of impairements, prognosis, activity modification, POC, and HEP  Instructed pt to continue HEP for continued progress       Ther Activity      5'    STS   Chair 2x10   Low mat  2x10              Gait Training                                Modalities                                HEP:   Access Code: [email or web address]  URL: [email or web address]  Date: 10/24/2024  Prepared by: Yenifer Richardson    Exercises  - Standing Lumbar Extension with Counter  - 2-3 x daily - 1 sets - 10 reps  - Hooklying Clamshell with Resistance  - 1 x daily - 2 sets - 10 reps  - Beginner Bridge  - 1 x daily - 2 sets - 10 reps  - Supine Hip Adduction Isometric with Ball  - 1 x daily - 1 sets - 10 reps - 5 seconds hold  - Side Stepping with Counter Support  - 1 x daily - 2 sets  - Standing Hip Abduction with Counter Support  - 1 x daily - 1 sets - 10 reps  - Standing Hip Extension with Counter Support  - 1 x daily - 1 sets - 10 reps  - Shoulder extension with resistance - Neutral  - 1 x daily - 1 sets - 10 reps  - Standing Anti-Rotation Press " with Anchored Resistance  - 1 x daily - 1 sets - 10 reps

## 2024-11-13 ENCOUNTER — OFFICE VISIT (OUTPATIENT)
Dept: PHYSICAL THERAPY | Facility: CLINIC | Age: 44
End: 2024-11-13
Payer: COMMERCIAL

## 2024-11-13 DIAGNOSIS — M54.16 LUMBAR RADICULOPATHY: Primary | ICD-10-CM

## 2024-11-13 PROCEDURE — 97112 NEUROMUSCULAR REEDUCATION: CPT

## 2024-11-13 PROCEDURE — 97110 THERAPEUTIC EXERCISES: CPT

## 2024-11-13 NOTE — PROGRESS NOTES
Daily Note     Today's date: 2024  Patient name: Kvng Quevedo  : 1980  MRN: [id number]  Referring provider: Marcos Sellers MD  Dx:   Encounter Diagnosis     ICD-10-CM    1. Lumbar radiculopathy  M54.16                      Subjective: Pt states that she is doing okay this morning. She does report presence of familiar lower back and bilateral lower extremity pain on arrival to session. She felt some relief after last PT session. Pain on arrival to session is 7/10 along familiar areas: tailbone, R posterior LE along thigh to level of R calf, and L posterior LE to L ankle; states that location is consistent w/ location prior to PT evaluation. She states that exercises provide relief and stretching sensation. Pt reports familiar R neck, upper back, anterior R shoulder and R arm radicular pain, and upper extremity discomfort with holding cervical retraction; pt seen in PT for this previously. States that she experienced relief in this area with cervical epidural steroid injection 2024 but it is wearing off; she thinks she has follow-up appointment with MD who provided injection.     Objective: See treatment diary below      Assessment: Pt reminded of concept of peripheralization and centralization and application of this concept to both upper and lower extremities. Pt instructed to follow-up with MD regarding familiar upper quarter symptoms. Pt educated that pain levels have been similar throughout PT episode of care; educated patient on benefit of returning to MD to discuss ongoing pain for proper management of condition and potential alternative intervention. Pt demonstrates appropriate form with pallof press. Pt instructed to take rest as needed with side stepping at rail due to demonstration of fatigue w/ this activity. Pt denies increase in familiar pain w/ exercises implemented in session; reports LE stretching and relief. Pt reminded to inform PT if peripheralization occurs throughout  "session. Decreased pain in bilateral LE reported at end of session by 1 point on pain scale. Tolerated treatment well. Patient would benefit from continued PT.       Plan: Continue per plan of care.  Progress treatment as tolerated.       Precautions:   Hx of Bipolar Disorder and Anxiety  Pt scheduled for C7-T1 CERVICAL EPIDURAL STEROID INJECTION (Spine Cervical) 9/20/2024    No past medical history on file.  Past Surgical History:   Procedure Laterality Date    EPIDURAL BLOCK INJECTION N/A 9/20/2024    Procedure: C7-T1 CERVICAL EPIDURAL STEROID INJECTION;  Surgeon: Marcos Sellers MD;  Location: [facility] MAIN OR;  Service: Pain Management        SOC: 9/17/2024  FOTO: NA  RE: 10/25/2024  POC Expiration: 12/10/2024  Auth expiration: 10/29/2024  Daily Treatment Log  Date: 10/28/2024 11/6/2024 11/7/2024 11/13/2024 Next session   Visit#/ auth: 11/12 12/23 13/23 14/23    Auth expiration 10/29/2024 1/28/2024 1/28/2024 1/28/2024    Objective Measures        flexibility        mechanical assessment         5xSTS 20.85 sec  Increased pain in tailbone        Manuals                                                Neuro Re-Ed 30'  25' 25'   25'    UBE retro          bridge 1x10; 2x  1x10; 2x  1x10; 2x  1x10     supine clam YTB 1x10; 2x  YTB 1x10; 2x  RTB 1x10; 2x      TA w/ add ball squeeze  5\"x10, yellow ball 5\"x10  5\"x10 multicolor ball     side step  YTB @ shins 3 laps near rail; 2x  YTB @ shins 3 laps near rail; 2x     pallof press Red tubing 2x10 ea  Red tubing 2x10  Red tubing 2x10  Red tubing 2x15     shoulder extension Red tubing 2x10  Red tubing 2x10  Red tubing 2x10  Red tubing 2x15    Low row  Red tubing 2x10  Red tubing 2x10   Green tubing 1x10     bwd shoulder rolls   1# DB 2x10  1# DB 1x10    Uni doorway shoulder ER stretch    5\"x10 R/L      Wall slides    5\"x10  5\"x10 ROM limited ~95 deg flex     std open books   1x10 R/L  1x10 R/L  1x10 R/L     Ther Ex  10' 15' 15'    Supine NG Seated LAQ w/ DF 1x10 R/L  Seated " "LAQ w/ DF 1x10 R/L  Seated LAQ w/ DF 1x10 R/L  Seated LAQ w/ DF on black Airpod 1x10 R/L     Supine LE up contralateral shin        REI w/ hands at hips 1x10=NE  1x10 1x10 1x10 at counter    BRIANA w/ knee flex 20x         stand hip ext/ abd YTB @ shins 1x10 ea alt R/L  RTB @ prox knees 1x10 ea alt R/L  RTB @ prox knees 2x10 ea alt R/L   RTB @ prox knees 1x15 ea alt R/L      HR/TR  HR off step 2x10  B/L  HR off step 2x10  B/L  HR off step 2x10  B/L     LTR  5\"x10 R/L   5\"x10 R/L      Objective measures         EDUCATION: Pathology, review of impairements, prognosis, activity modification, POC, and HEP  Instructed pt to continue HEP for continued progress   HEP updated and reviewed    Ther Activity         STS   Chair 2x10  Chair 2x10               Gait Training                             Modalities                             HEP:   Access Code: [email or web address]  URL: [email or web address]  Date: 11/13/2024  Prepared by: Yenifer Richardson    Exercises  - Standing Lumbar Extension with Counter  - 2-3 x daily - 1 sets - 10 reps  - Hooklying Clamshell with Resistance  - 1 x daily - 2 sets - 10 reps  - Beginner Bridge  - 1 x daily - 2 sets - 10 reps  - Supine Hip Adduction Isometric with Ball  - 1 x daily - 1 sets - 10 reps - 5 seconds hold  - Side Stepping with Counter Support  - 1 x daily - 2 sets  - Standing Hip Abduction with Counter Support  - 1 x daily - 1-2 sets - 10 reps  - Standing Hip Extension with Counter Support  - 1 x daily - 1-2 sets - 10 reps  - Shoulder extension with resistance - Neutral  - 1 x daily - 2 sets - 10 reps  - Standing Anti-Rotation Press with Anchored Resistance  - 1 x daily - 2 sets - 10 reps  - Standing Bilateral Low Shoulder Row with Anchored Resistance  - 1 x daily - 1 sets - 10 reps  - Heel Raises with Counter Support  - 1 x daily - 1-2 sets - 10 reps       "

## 2024-11-14 ENCOUNTER — OFFICE VISIT (OUTPATIENT)
Dept: PHYSICAL THERAPY | Facility: CLINIC | Age: 44
End: 2024-11-14
Payer: COMMERCIAL

## 2024-11-14 DIAGNOSIS — M54.16 LUMBAR RADICULOPATHY: Primary | ICD-10-CM

## 2024-11-14 PROCEDURE — 97112 NEUROMUSCULAR REEDUCATION: CPT

## 2024-11-14 PROCEDURE — 97110 THERAPEUTIC EXERCISES: CPT

## 2024-11-14 NOTE — PROGRESS NOTES
Daily Note     Today's date: 2024  Patient name: Kvng Quevedo  : 1980  MRN: [id number]  Referring provider: Marcos Sellers MD  Dx:   Encounter Diagnosis     ICD-10-CM    1. Lumbar radiculopathy  M54.16                      Subjective: pt reports that she is so/so. She cont with LBP but she does get relief w/ her stretching at times, she has been performing these daily.   Pt arrived 10 min late to session; was accommodated for with a shortened session       Objective: See treatment diary below      Assessment: Tolerated treatment well.pt cont to dem difficulty w/ OH reaching due to stiffness and pain in her shoulder/neck, cont to progress global ROM as tolerated per symptom irritability to be able to maximize functional mobility and pain modulation.  Patient would benefit from continued PT      Plan: Continue per plan of care.      Precautions:   Hx of Bipolar Disorder and Anxiety  Pt scheduled for C7-T1 CERVICAL EPIDURAL STEROID INJECTION (Spine Cervical) 2024    No past medical history on file.  Past Surgical History:   Procedure Laterality Date    EPIDURAL BLOCK INJECTION N/A 2024    Procedure: C7-T1 CERVICAL EPIDURAL STEROID INJECTION;  Surgeon: Marcos Sellers MD;  Location: [facility] MAIN OR;  Service: Pain Management        SOC: 2024  FOTO: NA  RE: 10/25/2024  POC Expiration: 12/10/2024  Auth expiration: 10/29/2024  Daily Treatment Log  Date: 10/28/2024 2024 2024 2024 2024   Visit#/ auth: 11/12 12/23 13/23 14/23 15/23   Auth expiration 10/29/2024 2025 2025 2025 2025   Objective Measures        flexibility        mechanical assessment         5xSTS 20.85 sec  Increased pain in tailbone        Manuals                                                Neuro Re-Ed 30'  25' 25'   25' 20'    UBE retro          bridge 1x10; 2x  1x10; 2x  1x10; 2x  1x10 1x10; 2x     supine clam YTB 1x10; 2x  YTB 1x10; 2x  RTB 1x10; 2x   Alt iso RTB 2x10 R/L    TA w/  "add ball squeeze  5\"x10, yellow ball 5\"x10  5\"x10 multicolor ball 5\"x15    side step  YTB @ shins 3 laps near rail; 2x  YTB @ shins 3 laps near rail; 2x RTB @ knees 20'x3     pallof press Red tubing 2x10 ea  Red tubing 2x10  Red tubing 2x10  Red tubing 2x15 Red tubing 2x10     shoulder extension Red tubing 2x10  Red tubing 2x10  Red tubing 2x10  Red tubing 2x15 Red tubing 2x10   Low row  Red tubing 2x10  Red tubing 2x10   Green tubing 1x10 Red tubing 2x10     bwd shoulder rolls   1# DB 2x10  1# DB 1x10 2# DB 1x10    Uni doorway shoulder ER stretch    5\"x10 R/L   5\"x10 R/L    Wall slides    5\"x10  5\"x10 ROM limited ~95 deg flex     std open books   1x10 R/L  1x10 R/L  1x10 R/L  1x10 R/L    Ther Ex  10' 15' 15' 15'    Supine NG Seated LAQ w/ DF 1x10 R/L  Seated LAQ w/ DF 1x10 R/L  Seated LAQ w/ DF 1x10 R/L  Seated LAQ w/ DF on black Airpod 1x10 R/L  Seated LAQ w/ DF on black Airpod 1x10 R/L     Supine LE up contralateral shin        REI w/ hands at hips 1x10=NE  1x10 1x10 1x10 at counter 1x10    BRIANA w/ knee flex 20x         stand hip ext/ abd YTB @ shins 1x10 ea alt R/L  RTB @ prox knees 1x10 ea alt R/L  RTB @ prox knees 2x10 ea alt R/L   RTB @ prox knees 1x15 ea alt R/L      HR/TR  HR off step 2x10  B/L  HR off step 2x10  B/L  HR off step 2x10  B/L     LTR  5\"x10 R/L   5\"x10 R/L   10\"x5 R/L    Objective measures         EDUCATION: Pathology, review of impairements, prognosis, activity modification, POC, and HEP  Instructed pt to continue HEP for continued progress   HEP updated and reviewed    Ther Activity         STS   Chair 2x10  Chair 2x10  Low mat w/ OH press 0# 2x10              Gait Training                             Modalities                             HEP:   Access Code: [email or web address]  URL: [email or web address]  Date: 11/13/2024  Prepared by: Yenifer Richardson    Exercises  - Standing Lumbar Extension with Counter  - 2-3 x daily - 1 sets - 10 reps  - Hooklying Clamshell with Resistance  - 1 x " daily - 2 sets - 10 reps  - Beginner Bridge  - 1 x daily - 2 sets - 10 reps  - Supine Hip Adduction Isometric with Ball  - 1 x daily - 1 sets - 10 reps - 5 seconds hold  - Side Stepping with Counter Support  - 1 x daily - 2 sets  - Standing Hip Abduction with Counter Support  - 1 x daily - 1-2 sets - 10 reps  - Standing Hip Extension with Counter Support  - 1 x daily - 1-2 sets - 10 reps  - Shoulder extension with resistance - Neutral  - 1 x daily - 2 sets - 10 reps  - Standing Anti-Rotation Press with Anchored Resistance  - 1 x daily - 2 sets - 10 reps  - Standing Bilateral Low Shoulder Row with Anchored Resistance  - 1 x daily - 1 sets - 10 reps  - Heel Raises with Counter Support  - 1 x daily - 1-2 sets - 10 reps

## 2024-11-19 ENCOUNTER — OFFICE VISIT (OUTPATIENT)
Dept: PHYSICAL THERAPY | Facility: CLINIC | Age: 44
End: 2024-11-19
Payer: COMMERCIAL

## 2024-11-19 DIAGNOSIS — M54.16 LUMBAR RADICULOPATHY: Primary | ICD-10-CM

## 2024-11-19 PROCEDURE — 97112 NEUROMUSCULAR REEDUCATION: CPT

## 2024-11-19 PROCEDURE — 97110 THERAPEUTIC EXERCISES: CPT

## 2024-11-19 NOTE — PROGRESS NOTES
"Daily Note     Today's date: 2024  Patient name: Kvng Quevedo  : 1980  MRN: [id number]  Referring provider: Marcos Sellers MD  Dx:   Encounter Diagnosis     ICD-10-CM    1. Lumbar radiculopathy  M54.16                      Subjective: Pt arrives 10 minutes late to PT session; pt accommodated w/ shortened session. Pt again states that she is \"so, so\" on arrival. States that she does feel a bit better compared to presentation at previous session; states that she has been doing her stretches and home program. Stretches continue to provide relief. She does feel she has made improvement since starting PT.       Objective: See treatment diary below      Assessment: Pt demonstrates improved mobility with hook-lying piriformis stretch; ROM with this activity improving compared to initial attempts w/ this position in earlier sessions at start of episode of care. Pt requires less cues throughout session for form correction. Pt continues to demonstrate improving ROM, strength, and activity tolerance with TE implemented in sessions. No complaints offered at end of session. Plan to perform re-evaluation in 2 visits. Plan to educated pt on benefit of attending 45 min session vs shortened session w/ frequent late arrivals. Tolerated treatment well. Patient would benefit from continued PT.       Plan: Continue per plan of care.  Progress treatment as tolerated.       Precautions:   Hx of Bipolar Disorder and Anxiety  Pt scheduled for C7-T1 CERVICAL EPIDURAL STEROID INJECTION (Spine Cervical) 2024    No past medical history on file.  Past Surgical History:   Procedure Laterality Date    EPIDURAL BLOCK INJECTION N/A 2024    Procedure: C7-T1 CERVICAL EPIDURAL STEROID INJECTION;  Surgeon: Marcos Sellers MD;  Location: [facility] MAIN OR;  Service: Pain Management        SOC: 2024  FOTO: NA  RE: 10/25/2024  POC Expiration: 12/10/2024  Auth expiration: 2025  Daily Treatment Log  Date: 2024  " "11/7/2024 11/13/2024 11/14/2024   Visit#/ auth: 16/23  13/23 14/23 15/23   Auth expiration 1/28/2025 1/28/2025 1/28/2025 1/28/2025   Objective Measures        flexibility        mechanical assessment         5xSTS        Manuals                                                Neuro Re-Ed 20'  25'   25' 20'     bridge 1x10; 2x   1x10; 2x  1x10 1x10; 2x     supine clam Alt iso RTB 2x10 R/L   RTB 1x10; 2x   Alt iso RTB 2x10 R/L    TA w/ add ball squeeze 5\"x15  5\"x10  5\"x10 multicolor ball 5\"x15    side step RTB @ knees 10'x2 R/L, 2x    YTB @ shins 3 laps near rail; 2x RTB @ knees 20'x3     pallof press Red tubing 2x15  Red tubing 2x10  Red tubing 2x15 Red tubing 2x10     shoulder extension Red tubing 2x15   Red tubing 2x10  Red tubing 2x15 Red tubing 2x10   Low row Green tubing 1x10  Red tubing 2x10   Green tubing 1x10 Red tubing 2x10     bwd shoulder rolls  2# DB 1x10    1# DB 1x10 2# DB 1x10    Uni doorway shoulder ER stretch    5\"x10 R/L   5\"x10 R/L    Wall slides    5\"x10  5\"x10 ROM limited ~95 deg flex     std open books    1x10 R/L  1x10 R/L  1x10 R/L    Ther Ex 15'  15' 15' 15'    Supine NG Seated LAQ w/ DF on black Airpod 1x10 R/L    Seated LAQ w/ DF 1x10 R/L  Seated LAQ w/ DF on black Airpod 1x10 R/L  Seated LAQ w/ DF on black Airpod 1x10 R/L     Supine LE up contralateral shin Figure 4 20\"x2 R/L sos at ankle for UE assist       REI w/ hands at hips   1x10 1x10 at counter 1x10    BRIANA w/ knee flex         stand hip ext/ abd RTB @ prox knees 1x15 ea alt R/L   RTB @ prox knees 2x10 ea alt R/L   RTB @ prox knees 1x15 ea alt R/L      HR/TR HR off step 2x10  B/L  HR off step 2x10  B/L  HR off step 2x10  B/L     LTR    5\"x10 R/L   10\"x5 R/L    Objective measures         EDUCATION: Pathology, review of impairements, prognosis, activity modification, POC, and HEP     HEP updated and reviewed    Ther Activity         STS High mat (21.5\") w/ foam under feet 2x10  Chair 2x10  Chair 2x10  Low mat w/ OH press 0# 2x10       "        Gait Training                             Modalities                             HEP:   Access Code: [email or web address]  URL: [email or web address]  Date: 11/13/2024  Prepared by: Yenifer Richardson    Exercises  - Standing Lumbar Extension with Counter  - 2-3 x daily - 1 sets - 10 reps  - Hooklying Clamshell with Resistance  - 1 x daily - 2 sets - 10 reps  - Beginner Bridge  - 1 x daily - 2 sets - 10 reps  - Supine Hip Adduction Isometric with Ball  - 1 x daily - 1 sets - 10 reps - 5 seconds hold  - Side Stepping with Counter Support  - 1 x daily - 2 sets  - Standing Hip Abduction with Counter Support  - 1 x daily - 1-2 sets - 10 reps  - Standing Hip Extension with Counter Support  - 1 x daily - 1-2 sets - 10 reps  - Shoulder extension with resistance - Neutral  - 1 x daily - 2 sets - 10 reps  - Standing Anti-Rotation Press with Anchored Resistance  - 1 x daily - 2 sets - 10 reps  - Standing Bilateral Low Shoulder Row with Anchored Resistance  - 1 x daily - 1 sets - 10 reps  - Heel Raises with Counter Support  - 1 x daily - 1-2 sets - 10 reps

## 2024-11-20 DIAGNOSIS — M54.12 CERVICAL RADICULOPATHY: ICD-10-CM

## 2024-11-20 DIAGNOSIS — M79.10 MYALGIA: ICD-10-CM

## 2024-11-20 RX ORDER — NAPROXEN 500 MG/1
500 TABLET ORAL 2 TIMES DAILY PRN
Qty: 60 TABLET | Refills: 0 | Status: SHIPPED | OUTPATIENT
Start: 2024-11-20 | End: 2024-12-20

## 2024-11-20 RX ORDER — METHOCARBAMOL 750 MG/1
750 TABLET, FILM COATED ORAL 2 TIMES DAILY PRN
Qty: 60 TABLET | Refills: 0 | Status: SHIPPED | OUTPATIENT
Start: 2024-11-20

## 2024-11-21 ENCOUNTER — OFFICE VISIT (OUTPATIENT)
Dept: PHYSICAL THERAPY | Facility: CLINIC | Age: 44
End: 2024-11-21
Payer: COMMERCIAL

## 2024-11-21 DIAGNOSIS — M54.16 LUMBAR RADICULOPATHY: Primary | ICD-10-CM

## 2024-11-21 PROCEDURE — 97112 NEUROMUSCULAR REEDUCATION: CPT

## 2024-11-21 PROCEDURE — 97110 THERAPEUTIC EXERCISES: CPT

## 2024-11-21 NOTE — PROGRESS NOTES
"Daily Note     Today's date: 2024  Patient name: Kvng Quevedo  : 1980  MRN: [id number]  Referring provider: Marcos Sellers MD  Dx:   Encounter Diagnosis     ICD-10-CM    1. Lumbar radiculopathy  M54.16                      Subjective: pt reports that she is feeling more pain than usual this morning, she has been up since 2am with the pain. Her whole left side is hurting her from her shoulder to her foot and also her R foot hurts and has some numbness. Denies doing anything differently that may have caused this increase in pain. \"I am going to go get some biofreeze after this, it helps a bit\" Cont w/ some cerv pain as well, not more than usual.       Objective: See treatment diary below  Seated thoracic ext over MB= dec/ neck/shoulder       Assessment: Tolerated treatment well. Pt dem no change to her R sided symptoms but did dem some mild relief of her L sided symptoms post session. Relief after gastroc stretch in B/L LE. Patient would benefit from continued PT      Plan: Continue per plan of care.      Precautions:   Hx of Bipolar Disorder and Anxiety  Pt scheduled for C7-T1 CERVICAL EPIDURAL STEROID INJECTION (Spine Cervical) 2024    No past medical history on file.  Past Surgical History:   Procedure Laterality Date    EPIDURAL BLOCK INJECTION N/A 2024    Procedure: C7-T1 CERVICAL EPIDURAL STEROID INJECTION;  Surgeon: Marcos Sellers MD;  Location: [facility] MAIN OR;  Service: Pain Management        SOC: 2024  FOTO: NA  RE: 10/25/2024  POC Expiration: 12/10/2024  Auth expiration: 2025  Daily Treatment Log  Date: 2024   Visit#/ auth: 16/23 17/23  14/23 15/23   Auth expiration 2025   Objective Measures        flexibility        mechanical assessment         5xSTS        Manuals                                                Neuro Re-Ed 20' 15'   25' 20'     bridge 1x10; 2x  2x10   1x10 1x10; 2x     supine " "clam Alt iso RTB 2x10 R/L  Alt iso RTB 2x10 R/L     Alt iso RTB 2x10 R/L    TA w/ add ball squeeze 5\"x15   5\"x10 multicolor ball 5\"x15    side step RTB @ knees 10'x2 R/L, 2x    YTB @ shins 3 laps near rail; 2x RTB @ knees 20'x3     pallof press Red tubing 2x15   Red tubing 2x15 Red tubing 2x10     shoulder extension Red tubing 2x15    Red tubing 2x15 Red tubing 2x10   Low row Green tubing 1x10   Green tubing 1x10 Red tubing 2x10     bwd shoulder rolls  2# DB 1x10    1# DB 1x10 2# DB 1x10    Uni doorway shoulder ER stretch   5\"x10 R/L     5\"x10 R/L    Wall slides   Pball roll up wall 5\"x10   5\"x10 ROM limited ~95 deg flex     std open books   1x10 R/L   1x10 R/L  1x10 R/L    Ther Ex 15' 25'   15' 15'    Supine NG Seated LAQ w/ DF on black Airpod 1x10 R/L   Seated LAQ w/ DF on black Airpod 1x10 R/L    Seated LAQ w/ DF on black Airpod 1x10 R/L  Seated LAQ w/ DF on black Airpod 1x10 R/L     Supine LE up contralateral shin Figure 4 20\"x2 R/L sos at ankle for UE assist Figure 4 15\"x4 R/L sos at ankle for UE assist       REI w/ hands at hips  1x15 to start   1x10 at counter 1x10    Seated thoracic ext over MB   1x15       BRIANA w/ knee flex         stand hip ext/ abd RTB @ prox knees 1x15 ea alt R/L    RTB @ prox knees 1x15 ea alt R/L      HR/TR HR off step 2x10  B/L Gastroc stretch off step 15\"x4 R/L   HR off step 2x10  B/L     LTR   10\"x10 R/L    10\"x5 R/L    Objective measures         EDUCATION: Pathology, review of impairements, prognosis, activity modification, POC, and HEP     HEP updated and reviewed    Ther Activity         STS High mat (21.5\") w/ foam under feet 2x10   Chair 2x10  Low mat w/ OH press 0# 2x10              Gait Training                             Modalities                             HEP:   Access Code: [email or web address]  URL: [email or web address]  Date: 11/13/2024  Prepared by: Yenifer Richardson    Exercises  - Standing Lumbar Extension with Counter  - 2-3 x daily - 1 sets - 10 reps  - " Hooklying Clamshell with Resistance  - 1 x daily - 2 sets - 10 reps  - Beginner Bridge  - 1 x daily - 2 sets - 10 reps  - Supine Hip Adduction Isometric with Ball  - 1 x daily - 1 sets - 10 reps - 5 seconds hold  - Side Stepping with Counter Support  - 1 x daily - 2 sets  - Standing Hip Abduction with Counter Support  - 1 x daily - 1-2 sets - 10 reps  - Standing Hip Extension with Counter Support  - 1 x daily - 1-2 sets - 10 reps  - Shoulder extension with resistance - Neutral  - 1 x daily - 2 sets - 10 reps  - Standing Anti-Rotation Press with Anchored Resistance  - 1 x daily - 2 sets - 10 reps  - Standing Bilateral Low Shoulder Row with Anchored Resistance  - 1 x daily - 1 sets - 10 reps  - Heel Raises with Counter Support  - 1 x daily - 1-2 sets - 10 reps

## 2024-11-25 ENCOUNTER — OFFICE VISIT (OUTPATIENT)
Dept: PHYSICAL THERAPY | Facility: CLINIC | Age: 44
End: 2024-11-25
Payer: COMMERCIAL

## 2024-11-25 DIAGNOSIS — M54.16 LUMBAR RADICULOPATHY: Primary | ICD-10-CM

## 2024-11-25 PROCEDURE — 97112 NEUROMUSCULAR REEDUCATION: CPT

## 2024-11-25 PROCEDURE — 97110 THERAPEUTIC EXERCISES: CPT

## 2024-11-25 NOTE — PROGRESS NOTES
"Daily Note     Today's date: 2024  Patient name: Kvng Quevedo  : 1980  MRN: [id number]  Referring provider: Marcos Sellers MD  Dx:   Encounter Diagnosis     ICD-10-CM    1. Lumbar radiculopathy  M54.16                      Subjective: Pt arrives late to session; pt accommodated w/ shortened session. Pt states that she is \"so-so\" this morning. States that she felt better after the stretches implemented last session. She states that she has continued familiar pain from low back to R gastroc over the weekend but she has been compliant w/ HEP and this provides relief. Pt has follow up with referring provider 24. Pt reports 7/10 pain at low back, R thigh, and R lower leg to instep of foot, as well as L medial knee pain. Pt states that she stopped doing epsom salt baths recently and contributes increase in pain to this. Pt states that her calf pain does feel like it is coming from her low back as she has pain along entire posterior RLE but she thinks her R calf is swollen compared to her left.         Objective: See treatment diary below    BLE observation  2024:   Calf circumference (measured 10 cm distal to tibial tuberosity) = 34 cm B/L  No redness or increased warmth of B/L gastroc  Pt suspects calf pain is due to familiar radicular pain.     Assessment: Pt instructed to return to MD to discuss ongoing pain due to high pain levels w/ continued efforts in PT. Pt instructed to monitor calf pain and contact MD w/ concerns if she suspects calf pain unrelated to familiar radicular pain; calf circumference symmetrical bilaterally. Progressed to green theraband with pallof press today; form appropriate w/ progression. Pt dem minimal change to her R sided symptoms with REI. Pt w/ report of 2 point decrease in pain following REI and gastroc stretch on slant board. Tolerated treatment well. Patient would benefit from continued PT.       Plan: Continue per plan of care.      Precautions:   Hx of " "Bipolar Disorder and Anxiety  Pt scheduled for C7-T1 CERVICAL EPIDURAL STEROID INJECTION (Spine Cervical) 9/20/2024    No past medical history on file.  Past Surgical History:   Procedure Laterality Date    EPIDURAL BLOCK INJECTION N/A 9/20/2024    Procedure: C7-T1 CERVICAL EPIDURAL STEROID INJECTION;  Surgeon: Marcos Sellers MD;  Location: [facility] MAIN OR;  Service: Pain Management        SOC: 9/17/2024  FOTO: NA  RE: 10/25/2024  POC Expiration: 12/10/2024  Auth expiration: 1/28/2025  Daily Treatment Log  Date: 11/19/2024 11/21/2024 11/25/2024 Next session 11/14/2024   Visit#/ auth: 16/23 17/23 18/23  15/23   Auth expiration 1/28/2025 1/28/2024 1/28/2024 1/28/2025   Objective Measures        flexibility        mechanical assessment        5xSTS        Manuals                                            Neuro Re-Ed 20' 15'  25'  20'     bridge 1x10; 2x  2x10  2x10   1x10; 2x     supine clam Alt iso RTB 2x10 R/L  Alt iso RTB 2x10 R/L   Alt iso RTB 2x10 R/L  Alt iso RTB 2x10 R/L    TA w/ add ball squeeze 5\"x15  5\"x10, 2x  5\"x15    side step RTB @ knees 10'x2 R/L, 2x     RTB @ knees 20'x3     pallof press Red tubing 2x15  Grn tubing 1 x10  Red tubing 2x10     shoulder extension Red tubing 2x15   Red tubing 2x15   Red tubing 2x10   Low row Green tubing 1x10  Green tubing 1x10  Red tubing 2x10     bwd shoulder rolls  2# DB 1x10     2# DB 1x10    Uni doorway shoulder ER stretch   5\"x10 R/L     5\"x10 R/L    Wall slides   Pball roll up wall 5\"x10        std open books   1x10 R/L  1x10 R/L  1x10 R/L    Ther Ex 15' 25'  13'  15'    Supine NG Seated LAQ w/ DF on black Airpod 1x10 R/L   Seated LAQ w/ DF on black Airpod 1x10 R/L   Seated LAQ w/ DF on black Airpod 1x10 R/L   Seated LAQ w/ DF on black Airpod 1x10 R/L     Supine LE up contralateral shin Figure 4 20\"x2 R/L sos at ankle for UE assist Figure 4 15\"x4 R/L sos at ankle for UE assist       REI w/ hands at hips  1x15 to start  1x10 dec, minimally better    1x10  1x10  " "  Seated thoracic ext over MB   1x15       BRIANA w/ knee flex         stand hip ext/ abd RTB @ prox knees 1x15 ea alt R/L   RTB @ prox knees 1x15 ea alt R/L       HR/TR HR off step 2x10  B/L Gastroc stretch off step 15\"x4 R/L  Gastroc stretch slant board 15\"x4 R/L     Trialed HR and TR from floor - painful, Held.      LTR   10\"x10 R/L  5\"x10 R/L  10\"x5 R/L    Objective measures        EDUCATION: Pathology, review of impairements, prognosis, activity modification, POC, and HEP        Ther Activity         STS High mat (21.5\") w/ foam under feet 2x10  Table + black air pod x10   Low mat w/ OH press 0# 2x10             Gait Training                          Modalities                          HEP:   Access Code: [email or web address]  URL: [email or web address]  Date: 11/13/2024  Prepared by: Yenifer Richardson    Exercises  - Standing Lumbar Extension with Counter  - 2-3 x daily - 1 sets - 10 reps  - Hooklying Clamshell with Resistance  - 1 x daily - 2 sets - 10 reps  - Beginner Bridge  - 1 x daily - 2 sets - 10 reps  - Supine Hip Adduction Isometric with Ball  - 1 x daily - 1 sets - 10 reps - 5 seconds hold  - Side Stepping with Counter Support  - 1 x daily - 2 sets  - Standing Hip Abduction with Counter Support  - 1 x daily - 1-2 sets - 10 reps  - Standing Hip Extension with Counter Support  - 1 x daily - 1-2 sets - 10 reps  - Shoulder extension with resistance - Neutral  - 1 x daily - 2 sets - 10 reps  - Standing Anti-Rotation Press with Anchored Resistance  - 1 x daily - 2 sets - 10 reps  - Standing Bilateral Low Shoulder Row with Anchored Resistance  - 1 x daily - 1 sets - 10 reps  - Heel Raises with Counter Support  - 1 x daily - 1-2 sets - 10 reps             "

## 2024-11-27 ENCOUNTER — APPOINTMENT (OUTPATIENT)
Dept: PHYSICAL THERAPY | Facility: CLINIC | Age: 44
End: 2024-11-27
Payer: COMMERCIAL

## 2024-12-05 ENCOUNTER — EVALUATION (OUTPATIENT)
Dept: PHYSICAL THERAPY | Facility: CLINIC | Age: 44
End: 2024-12-05
Payer: COMMERCIAL

## 2024-12-05 DIAGNOSIS — M54.16 LUMBAR RADICULOPATHY: Primary | ICD-10-CM

## 2024-12-05 PROCEDURE — 97110 THERAPEUTIC EXERCISES: CPT
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
## 2024-12-19 ENCOUNTER — OFFICE VISIT (OUTPATIENT)
Dept: PAIN MEDICINE | Facility: CLINIC | Age: 44
End: 2024-12-19
Payer: COMMERCIAL

## 2024-12-19 VITALS
BODY MASS INDEX: 27.62 KG/M2 | DIASTOLIC BLOOD PRESSURE: 60 MMHG | SYSTOLIC BLOOD PRESSURE: 110 MMHG | WEIGHT: 151 LBS | HEART RATE: 67 BPM

## 2024-12-19 DIAGNOSIS — M79.641 BILATERAL HAND PAIN: ICD-10-CM

## 2024-12-19 DIAGNOSIS — M79.642 BILATERAL HAND PAIN: ICD-10-CM

## 2024-12-19 DIAGNOSIS — G89.4 CHRONIC PAIN SYNDROME: ICD-10-CM

## 2024-12-19 DIAGNOSIS — M79.10 MYALGIA: ICD-10-CM

## 2024-12-19 DIAGNOSIS — M54.12 CERVICAL RADICULOPATHY: Primary | ICD-10-CM

## 2024-12-19 PROCEDURE — 99214 OFFICE O/P EST MOD 30 MIN: CPT | Performed by: STUDENT IN AN ORGANIZED HEALTH CARE EDUCATION/TRAINING PROGRAM

## 2024-12-19 RX ORDER — METHOCARBAMOL 750 MG/1
750 TABLET, FILM COATED ORAL 3 TIMES DAILY PRN
Qty: 90 TABLET | Refills: 2 | Status: SHIPPED | OUTPATIENT
Start: 2024-12-19 | End: 2025-03-19

## 2024-12-19 RX ORDER — CELECOXIB 200 MG/1
200 CAPSULE ORAL 2 TIMES DAILY PRN
Qty: 60 CAPSULE | Refills: 0 | Status: SHIPPED | OUTPATIENT
Start: 2024-12-19 | End: 2025-01-18

## 2024-12-19 RX ORDER — GABAPENTIN 400 MG/1
400 CAPSULE ORAL
Qty: 30 CAPSULE | Refills: 1 | Status: SHIPPED | OUTPATIENT
Start: 2024-12-19 | End: 2025-03-19

## 2024-12-19 NOTE — PROGRESS NOTES
Pain Medicine Follow-Up Note    Assessment:  1. Cervical radiculopathy    2. Myalgia    3. Bilateral hand pain    4. Chronic pain syndrome      Patient is a pleasant 44-year-old woman who presents as a follow-up visit after last being seen on 9/20/2024 where she underwent a C7-T1 cervical epidural steroid injection.  Patient symptoms are the same today rating her pain as a 7 Out of 10 on numeric rating scale.  She states the injection helped for about 2 to 3 months.  Her pain is worse throughout the day and the pain is constant.  The quality pain is sharp, cramping, pressure-like, shooting, numbing, pins-and-needles primarily in her neck rating down her bilateral lower extremities, mid back and also low back and left foot.  Patient not obtaining significant benefit from her current regimen.  Most recently patient was managed with naproxen 500 mg twice daily as needed, Robaxin-750 milligrams 3 times daily as needed along with gabapentin 400 mg at bedtime.  Patient has done extensive amount of physical therapy recently for her low back pain.    Given patient continues to have neck pain with radicular symptoms will place referral to chiropractic care for further management.  Additionally we will schedule patient for repeat C7-T1 cervical epidural steroid injection, she had greater than 3 months relief.  For symptomatic relief will rotate from naproxen to Celebrex 200 mg twice daily as needed.  Will continue gabapentin 400 mg nightly.  Will also continue Robaxin-750 milligrams 3 times daily.  Patient also with right wrist pain and I think she would benefit from bracing so new order placed for that today.  Plan:  Right wrist brace  Repeat C7-T1 GEORGE  Discontinue naproxen   Start celebrex 200 mg BID prn   Continue robaxin 750 mg TID prn   Continue gabapentin 400 mg nightly   Referral to chiropractic care  Discuss with PCP about rheumatologic workup   Orders Placed This Encounter   Procedures   • Brace     Location:    Right     Size:   Medium   • Ambulatory referral to Chiropractic     Standing Status:   Future     Expiration Date:   12/19/2025     Referral Priority:   Routine     Referral Type:   Chiropractic     Referral Reason:   Specialty Services Required     Requested Specialty:   Chiropractic Medicine     Number of Visits Requested:   1     Expiration Date:   12/19/2025       New Medications Ordered This Visit   Medications   • celecoxib (CeleBREX) 200 mg capsule     Sig: Take 1 capsule (200 mg total) by mouth 2 (two) times a day as needed for mild pain or moderate pain     Dispense:  60 capsule     Refill:  0   • methocarbamol (ROBAXIN) 750 mg tablet     Sig: Take 1 tablet (750 mg total) by mouth 3 (three) times a day as needed for muscle spasms     Dispense:  90 tablet     Refill:  2   • gabapentin (NEURONTIN) 400 mg capsule     Sig: Take 1 capsule (400 mg total) by mouth daily at bedtime     Dispense:  30 capsule     Refill:  1   • Misc. Devices (Wrist Brace) MISC     Sig: Use in the morning     Dispense:  1 each     Refill:  0       My impressions and treatment recommendations were discussed in detail with the patient who verbalized understanding and had no further questions.        Complete risks and benefits including bleeding, infection, tissue reaction, nerve injury and allergic reaction were discussed. The approach was demonstrated using models and literature was provided. Verbal and written consent was obtained.      Follow-up is planned in four weeks time or sooner as warranted.  Discharge instructions were provided. I personally saw and examined the patient and I agree with the above discussed plan of care.    History of Present Illness:    Kvng Quevedo is a 44 y.o. female who presents to [facility] for interval re-evaluation of the above stated pain complaints. The patient has a past medical and chronic pain history as outlined in the assessment section. She was last seen on  9/20/2024.    Patient is a pleasant 44-year-old woman who presents as a follow-up visit after last being seen on 9/20/2024 where she underwent a C7-T1 cervical epidural steroid injection.  Patient symptoms are the same today rating her pain as a 7 Out of 10 on numeric rating scale.  She states the injection helped for about 2 to 3 months.  Her pain is worse throughout the day and the pain is constant.  The quality pain is sharp, cramping, pressure-like, shooting, numbing, pins-and-needles primarily in her neck rating down her bilateral lower extremities, mid back and also low back and left foot.  Patient not obtaining significant benefit from her current regimen.  Most recently patient was managed with naproxen 500 mg twice daily as needed, Robaxin-750 milligrams 3 times daily as needed along with gabapentin 400 mg at bedtime.  Patient has done extensive amount of physical therapy recently for her low back pain.      Other than as stated above, the patient denies any interval changes in medications, medical condition, mental condition, symptoms, or allergies since the last office visit.         Review of Systems:    Review of Systems   Constitutional:  Negative for unexpected weight change.   HENT:  Negative for ear pain.    Eyes:  Negative for visual disturbance.   Respiratory:  Negative for shortness of breath and wheezing.    Gastrointestinal:  Negative for abdominal pain.   Musculoskeletal:  Positive for back pain, gait problem, joint swelling, neck pain and neck stiffness.        Decreased ROM, joint and muscle pain   Neurological:  Positive for dizziness, weakness, light-headedness, numbness and headaches.   Psychiatric/Behavioral:  Positive for decreased concentration and sleep disturbance. The patient is nervous/anxious.          History reviewed. No pertinent past medical history.    Past Surgical History:   Procedure Laterality Date   • EPIDURAL BLOCK INJECTION N/A 9/20/2024    Procedure: C7-T1 CERVICAL  EPIDURAL STEROID INJECTION;  Surgeon: Marcos Sellers MD;  Location: [facility] MAIN OR;  Service: Pain Management        Family History   Problem Relation Age of Onset   • Diabetes Mother        Social History     Occupational History   • Not on file   Tobacco Use   • Smoking status: Former     Current packs/day: 0.00     Types: Cigarettes     Quit date: 6/21/2021     Years since quitting: 3.4     Passive exposure: Never   • Smokeless tobacco: Never   • Tobacco comments:     Pt stated she was a social smoker    Vaping Use   • Vaping status: Never Used   Substance and Sexual Activity   • Alcohol use: Never   • Drug use: Never   • Sexual activity: Not Currently     Partners: Male         Current Outpatient Medications:   •  albuterol (Ventolin HFA) 90 mcg/act inhaler, Inhale 2 puffs every 6 (six) hours as needed for shortness of breath, Disp: 18 g, Rfl: 0  •  ARIPiprazole (ABILIFY) 10 mg tablet, Take 1 tablet (10 mg total) by mouth 2 (two) times a day, Disp: 60 tablet, Rfl: 0  •  celecoxib (CeleBREX) 200 mg capsule, Take 1 capsule (200 mg total) by mouth 2 (two) times a day as needed for mild pain or moderate pain, Disp: 60 capsule, Rfl: 0  •  ferrous sulfate 324 (65 Fe) mg, Take 1 tablet (324 mg total) by mouth daily before breakfast, Disp: 90 tablet, Rfl: 3  •  gabapentin (NEURONTIN) 400 mg capsule, Take 1 capsule (400 mg total) by mouth daily at bedtime, Disp: 30 capsule, Rfl: 1  •  methocarbamol (ROBAXIN) 750 mg tablet, Take 1 tablet (750 mg total) by mouth 3 (three) times a day as needed for muscle spasms, Disp: 90 tablet, Rfl: 2  •  Misc. Devices (Wrist Brace) MISC, Use in the morning, Disp: 1 each, Rfl: 0  •  Prenatal Vit-Fe Fumarate-FA (Prenatal Vitamin) 27-0.8 MG TABS, Take 1 tablet by mouth in the morning, Disp: 90 tablet, Rfl: 3  •  trolamine salicylate (ASPERCREME) 10 % cream, Apply topically daily as needed for muscle/joint pain, Disp: 85 g, Rfl: 1  •  hydrOXYzine pamoate (VISTARIL) 50 mg capsule, Take 1  capsule (50 mg total) by mouth 3 (three) times a day as needed for itching (Patient not taking: Reported on 12/19/2024), Disp: 60 capsule, Rfl: 1  •  meloxicam (MOBIC) 15 mg tablet, Take 1 tablet (15 mg total) by mouth daily, Disp: 30 tablet, Rfl: 1  •  methylPREDNISolone 4 MG tablet therapy pack, Use as directed on package (Patient not taking: Reported on 12/19/2024), Disp: 1 each, Rfl: 0    No Known Allergies    Physical Exam:    /60 (Patient Position: Sitting, Cuff Size: Adult)   Pulse 67   Wt 68.5 kg (151 lb)   BMI 27.62 kg/m²     Constitutional:normal, well developed, well nourished, alert, in no distress and non-toxic and no overt pain behavior.  Eyes:anicteric  HEENT:grossly intact  Neck:supple, symmetric, trachea midline and no masses   Pulmonary:even and unlabored  Cardiovascular:No edema or pitting edema present  Skin:Normal without rashes or lesions and well hydrated  Psychiatric:Mood and affect appropriate  Neurologic:Cranial Nerves II-XII grossly intact  Musculoskeletal:normal gait. Paresthesias in BLUE. TTP in right wrist.       Imaging  MRI CERVICAL SPINE WITHOUT CONTRAST     INDICATION: M54.12: Radiculopathy, cervical region.     COMPARISON:  None.     TECHNIQUE:  Multiplanar, multisequence imaging of the cervical spine was performed. .        IMAGE QUALITY:  Diagnostic     FINDINGS:     ALIGNMENT: Reversal of the normal cervical lordosis.     MARROW SIGNAL:  Normal marrow signal is identified within the visualized bony structures.  No discrete marrow lesion.     CERVICAL AND VISUALIZED THORACIC CORD:  Normal signal within the visualized cord.     PREVERTEBRAL AND PARASPINAL SOFT TISSUES:  Normal.     VISUALIZED POSTERIOR FOSSA:  The visualized posterior fossa demonstrates no abnormal signal.     CERVICAL DISC SPACES:     C2-C3:  Normal.     C3-C4:  Normal.     C4-C5: Disc osteophyte complex causing anterior impression on the thecal sac.     C5-C6: Disc osteophyte complex causing anterior  impression on the thecal sac and flattening of the anterior cord minimal spinal canal stenosis. Disc osteophyte complex and uncovertebral hypertrophy causing moderate to severe right neuroforaminal   narrowing.     C6-C7:  Normal.     C7-T1:  Normal.     UPPER THORACIC DISC SPACES:  Normal.     OTHER FINDINGS:  None.     IMPRESSION:     Multilevel degenerative changes most prominent at C5-C6     No orders to display         Orders Placed This Encounter   Procedures   • Brace   • Ambulatory referral to Chiropractic

## 2025-02-21 ENCOUNTER — HOSPITAL ENCOUNTER (OUTPATIENT)
Facility: AMBULARY SURGERY CENTER | Age: 45
Setting detail: OUTPATIENT SURGERY
Discharge: HOME | End: 2025-02-21
Attending: STUDENT IN AN ORGANIZED HEALTH CARE EDUCATION/TRAINING PROGRAM | Admitting: STUDENT IN AN ORGANIZED HEALTH CARE EDUCATION/TRAINING PROGRAM
Payer: COMMERCIAL

## 2025-02-21 ENCOUNTER — APPOINTMENT (OUTPATIENT)
Dept: RADIOLOGY | Facility: HOSPITAL | Age: 45
End: 2025-02-21
Payer: COMMERCIAL

## 2025-02-21 VITALS
TEMPERATURE: 97.2 F | RESPIRATION RATE: 18 BRPM | OXYGEN SATURATION: 98 % | HEART RATE: 76 BPM | DIASTOLIC BLOOD PRESSURE: 83 MMHG | SYSTOLIC BLOOD PRESSURE: 112 MMHG

## 2025-02-21 LAB
EXT PREGNANCY TEST URINE: NEGATIVE
EXT. CONTROL: NORMAL

## 2025-02-21 PROCEDURE — 62321 NJX INTERLAMINAR CRV/THRC: CPT | Performed by: STUDENT IN AN ORGANIZED HEALTH CARE EDUCATION/TRAINING PROGRAM

## 2025-02-21 PROCEDURE — NC001 PR NO CHARGE: Performed by: STUDENT IN AN ORGANIZED HEALTH CARE EDUCATION/TRAINING PROGRAM

## 2025-02-21 PROCEDURE — 81025 URINE PREGNANCY TEST: CPT | Performed by: STUDENT IN AN ORGANIZED HEALTH CARE EDUCATION/TRAINING PROGRAM

## 2025-02-21 RX ORDER — LIDOCAINE HYDROCHLORIDE 10 MG/ML
INJECTION, SOLUTION EPIDURAL; INFILTRATION; INTRACAUDAL; PERINEURAL AS NEEDED
Status: DISCONTINUED | OUTPATIENT
Start: 2025-02-21 | End: 2025-02-21 | Stop reason: HOSPADM

## 2025-02-21 RX ORDER — METHYLPREDNISOLONE ACETATE 80 MG/ML
INJECTION, SUSPENSION INTRA-ARTICULAR; INTRALESIONAL; INTRAMUSCULAR; SOFT TISSUE AS NEEDED
Status: DISCONTINUED | OUTPATIENT
Start: 2025-02-21 | End: 2025-02-21 | Stop reason: HOSPADM

## 2025-02-21 NOTE — OP NOTE
Pre-procedure Diagnosis: Cervical Radiculopathy  Post-procedure Diagnosis: Cervical radiculopathy  Procedure Title(s):  1. C7-T1 interlaminar epidural steroid injection      2. Intraoperative fluoroscopy  Attending Surgeon:   Marcos Sellers MD  Anesthesia:   Local     Indications: The patient is a 44 y.o. year-old female with a diagnosis of Cervical radiculopathy. The patient's history and physical exam were reviewed. The risks, benefits and alternatives to the procedure were discussed, and all questions were answered to the patient's satisfaction. The patient agreed to proceed, and written informed consent was obtained.    Procedure in Detail: The patient was brought into the procedure room and placed in the prone position on the fluoroscopy table. The area of the cervical spine was prepped with chlorhexidine gluconate solution times one and draped in a sterile manner.    The C7-T1 interspace was identified and marked under AP fluoroscopy. The skin and subcutaneous tissues in the area were anesthetized with 1% lidocaine. A 20-gauge Tuohy epidural needle was directed toward the interspace under fluoroscopic guidance until the ligamentum flavum was engaged. The C-arm was oblique to the right to obtain a contra-lateral oblique view.  From this point, a loss of resistance technique with air was used to identify entrance of the needle into the epidural space. Once an appropriate loss was obtained, negative aspiration was confirmed, and 1 ml Omnipaque 300 contrast solution was injected. An appropriate epidurogram was noted.    Then, after negative aspiration, a solution consisting of 1-mL depomedrol(80mg/ml) and 2-mL preservative-free saline was easily injected. The needle was removed with a 1% lidocaine flush. The patient's back was cleaned and a bandage was placed over the site of needle insertion.    Disposition: The patient tolerated the procedure well, and there were no apparent complications. The patient was  taken to the recovery area where written discharge instructions for the procedure were given.     Estimated Blood Loss: None  Specimens Obtained: N/A

## 2025-02-21 NOTE — H&P
History of Present Illness: The patient is a 44 y.o. female who presents with complaints of neck pain.     No past medical history on file.    Past Surgical History:   Procedure Laterality Date    EPIDURAL BLOCK INJECTION N/A 9/20/2024    Procedure: C7-T1 CERVICAL EPIDURAL STEROID INJECTION;  Surgeon: Marcos Sellers MD;  Location: [facility] MAIN OR;  Service: Pain Management        No current facility-administered medications for this encounter.    No Known Allergies    Physical Exam: There were no vitals filed for this visit.  General: Awake, Alert, Oriented x 3, Mood and affect appropriate  Respiratory: Respirations even and unlabored  Cardiovascular: Peripheral pulses intact; no edema  Musculoskeletal Exam: neck pain.     ASA Score: 3         Assessment: Cervical radiculopathy    Plan: C7-T1 GEORGE

## 2025-03-07 ENCOUNTER — TELEPHONE (OUTPATIENT)
Dept: PAIN MEDICINE | Facility: MEDICAL CENTER | Age: 45
End: 2025-03-07

## 2025-03-07 DIAGNOSIS — M54.12 CERVICAL RADICULOPATHY: ICD-10-CM

## 2025-03-07 RX ORDER — CELECOXIB 200 MG/1
200 CAPSULE ORAL 2 TIMES DAILY PRN
Qty: 60 CAPSULE | Refills: 0 | Status: SHIPPED | OUTPATIENT
Start: 2025-03-07 | End: 2025-04-06

## 2025-03-07 RX ORDER — GABAPENTIN 400 MG/1
400 CAPSULE ORAL
Qty: 90 CAPSULE | Refills: 0 | Status: SHIPPED | OUTPATIENT
Start: 2025-03-07 | End: 2025-06-05

## 2025-03-07 NOTE — TELEPHONE ENCOUNTER
Patient reports 50% improvement post inj  Pain level 5-6/10   Patient requested medications to be refilled and also a follow up appointment. Will forward to Dr. Sellers clinical staff to follow up with patient.

## 2025-03-07 NOTE — TELEPHONE ENCOUNTER
S/W pt, scheduled for f/u 4/3/25    Pt asking for refills on Celebrex and Gabapentin. Continues to take both and currently out of both medications   Made aware she should have refills on the Methocarbamol- to check with pharmacy

## 2025-04-03 ENCOUNTER — OFFICE VISIT (OUTPATIENT)
Dept: PAIN MEDICINE | Facility: CLINIC | Age: 45
End: 2025-04-03
Payer: COMMERCIAL

## 2025-04-03 VITALS — BODY MASS INDEX: 27.6 KG/M2 | HEIGHT: 62 IN | WEIGHT: 150 LBS

## 2025-04-03 DIAGNOSIS — G89.4 CHRONIC PAIN SYNDROME: ICD-10-CM

## 2025-04-03 DIAGNOSIS — M54.12 CERVICAL RADICULOPATHY: Primary | ICD-10-CM

## 2025-04-03 DIAGNOSIS — M79.10 MYALGIA: ICD-10-CM

## 2025-04-03 PROCEDURE — 99214 OFFICE O/P EST MOD 30 MIN: CPT | Performed by: STUDENT IN AN ORGANIZED HEALTH CARE EDUCATION/TRAINING PROGRAM

## 2025-04-03 RX ORDER — METHOCARBAMOL 750 MG/1
750 TABLET, FILM COATED ORAL 3 TIMES DAILY PRN
Qty: 90 TABLET | Refills: 2 | Status: SHIPPED | OUTPATIENT
Start: 2025-04-03 | End: 2025-07-02

## 2025-04-03 RX ORDER — GABAPENTIN 400 MG/1
400 CAPSULE ORAL
Qty: 90 CAPSULE | Refills: 0 | Status: SHIPPED | OUTPATIENT
Start: 2025-04-03 | End: 2025-07-02

## 2025-04-03 RX ORDER — CELECOXIB 200 MG/1
200 CAPSULE ORAL 2 TIMES DAILY PRN
Qty: 60 CAPSULE | Refills: 0 | Status: SHIPPED | OUTPATIENT
Start: 2025-04-03 | End: 2025-05-03

## 2025-04-03 NOTE — PROGRESS NOTES
Pain Medicine Follow-Up Note    Assessment:  1. Cervical radiculopathy    2. Myalgia    3. Chronic pain syndrome      Patient is a 44-year-old woman who presents as a follow-up visit after last being seen on 2/21/2025 where she underwent a C7-T1 cervical epidural.  Patient symptoms are the same rating pain 7 out of 10 on neck pain scale.  States the injection improved her pain by about 20% and she states her medication regimen improves her pain by about 50%.  Her pain is constant and the quality pain is burning, dull aching, sharp, throbbing, cramping, pressure-like, shooting, numbing, pins-and-needles. The pain does interfere with her daily activities and she has been doing home exercise.  Patient complaining mainly of neck pain that radiates into her mid low back also goes down into her bilateral legs.    Patient continuing to complain of symptoms of cervical radiculopathy. Discussed possibility of increasing gabapentin but she would like to hold off. Will continue with gabapentin 400 mg nightly along with celebex and robaxin. Will place a new referral to chiropractic care and also to PT for consideration of dry needling. Patient amenable to this plan.   Plan:  Orders Placed This Encounter   Procedures   • Ambulatory referral to Chiropractic     Standing Status:   Future     Expiration Date:   4/3/2026     Referral Priority:   Routine     Referral Type:   Chiropractic     Referral Reason:   Specialty Services Required     Requested Specialty:   Chiropractic Medicine     Number of Visits Requested:   1     Expiration Date:   4/3/2026   • Ambulatory referral to Physical Therapy     Standing Status:   Future     Expiration Date:   4/3/2026     Referral Priority:   Routine     Referral Type:   Physical Therapy     Referral Reason:   Specialty Services Required     Requested Specialty:   Physical Therapy     Number of Visits Requested:   1     Expiration Date:   4/3/2026       New Medications Ordered This Visit    Medications   • gabapentin (NEURONTIN) 400 mg capsule     Sig: Take 1 capsule (400 mg total) by mouth daily at bedtime     Dispense:  90 capsule     Refill:  0   • methocarbamol (ROBAXIN) 750 mg tablet     Sig: Take 1 tablet (750 mg total) by mouth 3 (three) times a day as needed for muscle spasms     Dispense:  90 tablet     Refill:  2   • celecoxib (CeleBREX) 200 mg capsule     Sig: Take 1 capsule (200 mg total) by mouth 2 (two) times a day as needed for mild pain or moderate pain     Dispense:  60 capsule     Refill:  0       My impressions and treatment recommendations were discussed in detail with the patient who verbalized understanding and had no further questions.        Follow-up is planned in three months time or sooner as warranted.  Discharge instructions were provided. I personally saw and examined the patient and I agree with the above discussed plan of care.    History of Present Illness:    Kvng Quevedo is a 44 y.o. female who presents to [facility] for interval re-evaluation of the above stated pain complaints. The patient has a past medical and chronic pain history as outlined in the assessment section. She was last seen on 2/21/2025.    Patient is a 44-year-old woman who presents as a follow-up visit after last being seen on 2/21/2025 where she underwent a C7-T1 cervical epidural.  Patient symptoms are the same rating pain 7 out of 10 on neck pain scale.  States the injection improved her pain by about 20% and she states her medication regimen improves her pain by about 50%.  Her pain is constant and the quality pain is burning, dull aching, sharp, throbbing, cramping, pressure-like, shooting, numbing, pins-and-needles. The pain does interfere with her daily activities and she has been doing home exercise.  Patient complaining mainly of neck pain that radiates into her mid low back also goes down into her bilateral legs.      Other than as stated above, the patient  denies any interval changes in medications, medical condition, mental condition, symptoms, or allergies since the last office visit.         Review of Systems:    Review of Systems   Constitutional:  Negative for unexpected weight change.   HENT:  Negative for ear pain.    Eyes:  Negative for visual disturbance.   Respiratory:  Positive for chest tightness and shortness of breath. Negative for wheezing.    Cardiovascular:  Positive for chest pain and palpitations.   Gastrointestinal:  Negative for abdominal pain.   Musculoskeletal:  Positive for back pain, gait problem, joint swelling, neck pain and neck stiffness.        Decreased ROM, joint and muscle pain   Neurological:  Positive for dizziness, weakness, light-headedness and numbness.   Psychiatric/Behavioral:  Positive for sleep disturbance. Negative for decreased concentration.          History reviewed. No pertinent past medical history.    Past Surgical History:   Procedure Laterality Date   • EPIDURAL BLOCK INJECTION N/A 9/20/2024    Procedure: C7-T1 CERVICAL EPIDURAL STEROID INJECTION;  Surgeon: Marcos Sellers MD;  Location: [facility] OR;  Service: Pain Management    • EPIDURAL BLOCK INJECTION N/A 2/21/2025    Procedure: C7-T1 CERVICAL EPIDURAL STEROID INJECTION;  Surgeon: Marcos Sellers MD;  Location: [facility] MAIN OR;  Service: Pain Management        Family History   Problem Relation Age of Onset   • Diabetes Mother        Social History     Occupational History   • Not on file   Tobacco Use   • Smoking status: Former     Current packs/day: 0.00     Types: Cigarettes     Quit date: 6/21/2021     Years since quitting: 3.7     Passive exposure: Never   • Smokeless tobacco: Never   • Tobacco comments:     Pt stated she was a social smoker    Vaping Use   • Vaping status: Never Used   Substance and Sexual Activity   • Alcohol use: Never   • Drug use: Never   • Sexual activity: Not Currently     Partners: Male         Current Outpatient Medications:   •   "albuterol (Ventolin HFA) 90 mcg/act inhaler, Inhale 2 puffs every 6 (six) hours as needed for shortness of breath, Disp: 18 g, Rfl: 0  •  ARIPiprazole (ABILIFY) 10 mg tablet, Take 1 tablet (10 mg total) by mouth 2 (two) times a day, Disp: 60 tablet, Rfl: 0  •  celecoxib (CeleBREX) 200 mg capsule, Take 1 capsule (200 mg total) by mouth 2 (two) times a day as needed for mild pain or moderate pain, Disp: 60 capsule, Rfl: 0  •  ferrous sulfate 324 (65 Fe) mg, Take 1 tablet (324 mg total) by mouth daily before breakfast, Disp: 90 tablet, Rfl: 3  •  gabapentin (NEURONTIN) 400 mg capsule, Take 1 capsule (400 mg total) by mouth daily at bedtime, Disp: 90 capsule, Rfl: 0  •  methocarbamol (ROBAXIN) 750 mg tablet, Take 1 tablet (750 mg total) by mouth 3 (three) times a day as needed for muscle spasms, Disp: 90 tablet, Rfl: 2  •  Prenatal Vit-Fe Fumarate-FA (Prenatal Vitamin) 27-0.8 MG TABS, Take 1 tablet by mouth in the morning, Disp: 90 tablet, Rfl: 3  •  trolamine salicylate (ASPERCREME) 10 % cream, Apply topically daily as needed for muscle/joint pain, Disp: 85 g, Rfl: 1    No Known Allergies    Physical Exam:    Ht 5' 2\" (1.575 m)   Wt 68 kg (150 lb)   BMI 27.44 kg/m²     Constitutional:normal, well developed, well nourished, alert, in no distress and non-toxic and no overt pain behavior.  Eyes:anicteric  HEENT:grossly intact  Neck:supple, symmetric, trachea midline and no masses   Pulmonary:even and unlabored  Cardiovascular:No edema or pitting edema present  Skin:Normal without rashes or lesions and well hydrated  Psychiatric:Mood and affect appropriate  Neurologic:Cranial Nerves II-XII grossly intact  Musculoskeletal:normal gait. TTP in midline cervical spine and bilateral cervical paraspinal muscles.       Imaging  MRI CERVICAL SPINE WITHOUT CONTRAST     INDICATION: M54.12: Radiculopathy, cervical region.     COMPARISON:  None.     TECHNIQUE:  Multiplanar, multisequence imaging of the cervical spine was performed. " .        IMAGE QUALITY:  Diagnostic     FINDINGS:     ALIGNMENT: Reversal of the normal cervical lordosis.     MARROW SIGNAL:  Normal marrow signal is identified within the visualized bony structures.  No discrete marrow lesion.     CERVICAL AND VISUALIZED THORACIC CORD:  Normal signal within the visualized cord.     PREVERTEBRAL AND PARASPINAL SOFT TISSUES:  Normal.     VISUALIZED POSTERIOR FOSSA:  The visualized posterior fossa demonstrates no abnormal signal.     CERVICAL DISC SPACES:     C2-C3:  Normal.     C3-C4:  Normal.     C4-C5: Disc osteophyte complex causing anterior impression on the thecal sac.     C5-C6: Disc osteophyte complex causing anterior impression on the thecal sac and flattening of the anterior cord minimal spinal canal stenosis. Disc osteophyte complex and uncovertebral hypertrophy causing moderate to severe right neuroforaminal   narrowing.     C6-C7:  Normal.     C7-T1:  Normal.     UPPER THORACIC DISC SPACES:  Normal.     OTHER FINDINGS:  None.     IMPRESSION:     Multilevel degenerative changes most prominent at C5-C6  No orders to display         Orders Placed This Encounter   Procedures   • Ambulatory referral to Chiropractic   • Ambulatory referral to Physical Therapy

## 2025-05-19 ENCOUNTER — TELEPHONE (OUTPATIENT)
Dept: FAMILY MEDICINE CLINIC | Facility: CLINIC | Age: 45
End: 2025-05-19

## 2025-05-19 DIAGNOSIS — Z13.1 SCREENING FOR DIABETES MELLITUS (DM): ICD-10-CM

## 2025-05-19 DIAGNOSIS — R79.89 ELEVATED LFTS: ICD-10-CM

## 2025-05-19 DIAGNOSIS — R73.9 ELEVATED SERUM GLUCOSE: ICD-10-CM

## 2025-05-19 DIAGNOSIS — E55.9 VITAMIN D DEFICIENCY: Primary | ICD-10-CM

## 2025-05-19 DIAGNOSIS — E61.1 IRON DEFICIENCY: ICD-10-CM

## 2025-05-19 DIAGNOSIS — E78.1 HYPERTRIGLYCERIDEMIA: ICD-10-CM

## 2025-05-20 DIAGNOSIS — M54.12 CERVICAL RADICULOPATHY: ICD-10-CM

## 2025-05-21 RX ORDER — GABAPENTIN 400 MG/1
400 CAPSULE ORAL
Qty: 30 CAPSULE | Refills: 0 | Status: SHIPPED | OUTPATIENT
Start: 2025-05-21 | End: 2025-08-19

## 2025-05-27 ENCOUNTER — TELEPHONE (OUTPATIENT)
Dept: FAMILY MEDICINE CLINIC | Facility: CLINIC | Age: 45
End: 2025-05-27

## 2025-05-27 NOTE — TELEPHONE ENCOUNTER
Spoke with patient - she requested appt on 5/29/25 to be cancelled for now, has not completed labs yet. She will call us back to reschedule physical. NFA at this time

## 2025-05-27 NOTE — TELEPHONE ENCOUNTER
----- Message from JOSE Matthew sent at 5/27/2025  8:47 AM EDT -----  Regarding: labs  Pt has upcoming appt for physical on 5/29. Labs have not been done. Needs to have labs done prior to appt. Orders in chart. Please call pt to advise.

## 2025-06-04 ENCOUNTER — TELEPHONE (OUTPATIENT)
Dept: PAIN MEDICINE | Facility: CLINIC | Age: 45
End: 2025-06-04

## 2025-06-05 NOTE — TELEPHONE ENCOUNTER
LVM to have her call me back and reschedule for the 7/2/25 dr shawn barney that day on vac please help reschedule when she calls back.. thank you

## (undated) DEVICE — SYRINGE 5ML LL

## (undated) DEVICE — TRAY PAIN SUPPORT

## (undated) DEVICE — GLOVE SRG LF STRL BGL SKNSNS 7.5 PF

## (undated) DEVICE — NEEDLE BLUNT 18 G X 1 1/2 W FILTER

## (undated) DEVICE — TOWEL SET X-RAY

## (undated) DEVICE — SYRINGE LUER LOK 7ML LOSS OF RESISTANCE

## (undated) DEVICE — TRAY EPIDURAL PERIFIX 20GA X 3.5IN TUOHY 8ML

## (undated) DEVICE — PLASTIC ADHESIVE BANDAGE: Brand: CURITY

## (undated) DEVICE — CHLORAPREP APPLICATOR TINTED 10.5ML ONE-STEP